# Patient Record
Sex: FEMALE | Race: WHITE | NOT HISPANIC OR LATINO | Employment: FULL TIME | URBAN - METROPOLITAN AREA
[De-identification: names, ages, dates, MRNs, and addresses within clinical notes are randomized per-mention and may not be internally consistent; named-entity substitution may affect disease eponyms.]

---

## 2017-06-21 ENCOUNTER — TRANSCRIBE ORDERS (OUTPATIENT)
Dept: ADMINISTRATIVE | Facility: HOSPITAL | Age: 47
End: 2017-06-21

## 2017-06-21 DIAGNOSIS — Z12.31 VISIT FOR SCREENING MAMMOGRAM: Primary | ICD-10-CM

## 2017-07-20 ENCOUNTER — HOSPITAL ENCOUNTER (OUTPATIENT)
Dept: MAMMOGRAPHY | Facility: MEDICAL CENTER | Age: 47
Discharge: HOME | End: 2017-07-20
Payer: COMMERCIAL

## 2017-07-20 DIAGNOSIS — Z12.31 VISIT FOR SCREENING MAMMOGRAM: ICD-10-CM

## 2017-07-20 PROCEDURE — 77063 BREAST TOMOSYNTHESIS BI: CPT

## 2017-07-20 PROCEDURE — G0202 SCR MAMMO BI INCL CAD: HCPCS

## 2018-07-11 ENCOUNTER — EVALUATION (OUTPATIENT)
Dept: PHYSICAL THERAPY | Facility: CLINIC | Age: 48
End: 2018-07-11
Payer: COMMERCIAL

## 2018-07-11 DIAGNOSIS — M75.02 ADHESIVE BURSITIS OF LEFT SHOULDER: ICD-10-CM

## 2018-07-11 DIAGNOSIS — M25.512 ACUTE PAIN OF LEFT SHOULDER: Primary | ICD-10-CM

## 2018-07-11 DIAGNOSIS — M75.82 ROTATOR CUFF TENDONITIS, LEFT: ICD-10-CM

## 2018-07-11 PROCEDURE — 97110 THERAPEUTIC EXERCISES: CPT | Performed by: PHYSICAL THERAPIST

## 2018-07-11 PROCEDURE — 97140 MANUAL THERAPY 1/> REGIONS: CPT | Performed by: PHYSICAL THERAPIST

## 2018-07-11 PROCEDURE — G8984 CARRY CURRENT STATUS: HCPCS | Performed by: PHYSICAL THERAPIST

## 2018-07-11 PROCEDURE — 97162 PT EVAL MOD COMPLEX 30 MIN: CPT | Performed by: PHYSICAL THERAPIST

## 2018-07-11 PROCEDURE — G8985 CARRY GOAL STATUS: HCPCS | Performed by: PHYSICAL THERAPIST

## 2018-07-11 NOTE — LETTER
2018    Elly Barthel, MD  [facility] [address] [address]    Patient: Amber Carvajal   YOB: 1970   Date of Visit: 2018     Encounter Diagnosis     ICD-10-CM    1  Acute pain of left shoulder M25 512    2  Adhesive bursitis of left shoulder M75 02    3  Rotator cuff tendonitis, left M75 82        Dear Dr Titi Bowser:    Please review the attached Plan of Care from [facility] recent visit  Please verify that you agree therapy should continue by signing the attached document and sending it back to our office  If you have any questions or concerns, please don't hesitate to call  Sincerely,    Charles Aguillon, PT      Referring Provider:      I certify that I have read the below Plan of Care and certify the need for these services furnished under this plan of treatment while under my care  Elly Barthel, MD  [facility] [address]: [phone]          PT Evaluation     Today's date: 2018  Patient name: Amber Carvajal  : 1970  MRN: [id number]  Referring provider: Kenn Gleason MD  Dx:   Encounter Diagnosis     ICD-10-CM    1  Acute pain of left shoulder M25 512    2  Adhesive bursitis of left shoulder M75 02    3  Rotator cuff tendonitis, left M75 82      Assessment  Impairments: abnormal muscle tone, abnormal or restricted ROM, abnormal movement, impaired physical strength, pain with function, poor posture  and poor body mechanics    Assessment details: Suspect Rotator and Adhesive Capsulitis of the left shoulder    Understanding of Dx/Px/POC: excellent  Goals  STG 2-4 weeks  Increase UE strength 3-6 lbs  Decrease pain to <5/10 with activity  Increase UE PROM to [facility] all planes    LTG 6-8 weeks  Increase UE strength 10-20 lbs  Demonstrate UE AROM WFL all planes  Decrease pain to <2/10 with activity  Patient independent with HEP      Plan  Planned modality interventions: ultrasound and unattended electrical stimulation  Planned therapy interventions: manual therapy, joint mobilization, postural training, patient education, strengthening, stretching, therapeutic activities, therapeutic exercise, therapeutic training, flexibility and coordination  Frequency: 3x week  Duration in weeks: 6  Treatment plan discussed with: patient      Subjective Evaluation    Quality of life: excellent    Pain  Quality: discomfort, sharp, radiating, tight, throbbing, dull ache, cramping and knife-like  Relieving factors: medications, ice, change in position, relaxation and rest  Aggravating factors: overhead activity, keyboarding and lifting    Treatments  Current treatment: physical therapy  Patient Goals  Patient goals for therapy: decreased pain, increased motion, return to work, return to [employer] activities, independence with ADLs/IADLs and increased strength        Objective    Flowsheet Rows      Most Recent Value   PT/OT G-Codes   Assessment Type  Evaluation   G code set  Carrying, Moving & Handling Objects   Carrying, Moving and Handling Objects Current Status ()  CK   Carrying, Moving and Handling Objects Goal Status ()  CH        Precautions: Left Shoulder pain and limitations  Date of onset:  4/11/18     Date of Surgery:  None    History of Present Episode: 7/11/2018  Bernardbabs Leo states that over the past few months her left shoulder has flared up  She is really flared up this past week  Past Medical History:    7/11/2018  Kathie Bailey reports no past medical history  Previous Level of Functional Ability:  7/11/2018  Bernardbabs Leo states her left shoulder was fine before these current issues with no limitations  Inspection / Palpation:  7/11/2018  Mesomorphic body type  No signs of infection  No signs of wounds  No signs of drainage  No signs of ecchymotic regions  No signs of erythemic regions  Moderate signs of muscle spasm  Moderate signs of muscle guarding     Moderate signs of tenderness reported to palpation  No signs of swelling  No signs of a surgery site  Current conditions appear consistent with recent episode  Chief Complaints:  7/11/2018  Ricardo Means reports moderate difficulty with bending her left shoulder  Ricardo Means reports moderate difficulty with movement of her left shoulder  Ricardo Means reports mild to moderate difficulty with use of her left arm  Ricardo Means reports moderate difficulty with lifting / elevating her left arm  Ricardo Means reports mild to moderate difficulty with sleeping  Ricardo Means reports moderate difficulty with her strength and endurance  Ricardo Means reports moderate limitations with her left shoulder range of motion  Ricardo Means reports moderate to severe difficulty lying on her left shoulder region      SHOULDER PAIN Resting Palpation Moving Lifting Elevating   7/11/2018 Rt 0 0 0 0 0   7/11/2018 Lt  0-1 0-2 0-5 0-7 2-7     SHOULDER PAIN Sleeping Throwing Pushing Pulling Twisting   7/11/2018 Rt 0 0 0 0 0   7/11/2018 Lt  0-2 NA 0-2 0-2 0-2     SHOULDER AROM Flexion Extension Abduction   7/11/2018 Rt 180° 65° 180°   7/11/2018 Lt 135° 40° 135°     SHOULDER AROM Hor Add Ext Rotation Int Rotation   7/11/2018 Rt 75° 95° 95°   7/11/2018 Lt 45° 65° 52°   29  SHLD MMT Flexion Extension Abduction   7/11/2018 Rt 0/10  28 lbs 0/10  27 lbs 0/10  29 lbs   7/11/2018 Lt 1/10  14 lbs 1/10  15 lbs 3/10  8 lbs     SHLD MMT Hor Add Ext Rotation Int Rotation   7/11/2018 Rt 0/10  29 lbs 0/10  28 lbs 0/10  29 lbs   7/11/2018 Lt 0/10  12 lbs 3/10  9 lbs 0/10  10 lbs     Shoulder Screen Rotator Cuff Apprehension Anterior  Stability Posterior  Stability   Right Negative Negative Negative Negative   Left POSITIVE Negative Negative Negative     Shoulder Screen AC Joint SC Joint Drop Arm Adhesive Capsulitis   Right Negative Negative Negative Negative   Left Negative Negative POSITIVE POSITIVE     Daily Treatment Diary     Manual  7/11       MFR,TPR        MOB, FM        MT, ROM 15'               Exercise Diary 7/11       UBC 10'       FWC-Codman's        FWC-Curls,Tri        Power Web        Digiflex        Finger Ladder        Alec-BP,PD,Lats,Row        Trimax-BP        W/P-PNF,IR,ER,PU,PS,Throw-Top,Mid,Bot        WP-Overhead 10'       ME, PE                Modalities 7/11       MH&ES        US

## 2018-07-11 NOTE — LETTER
2018    Dahiana Welch MD  [facility] [address] [address]    Patient: Helena Acosta   YOB: 1970   Date of Visit: 2018     Encounter Diagnosis     ICD-10-CM    1  Acute pain of left shoulder M25 512    2  Adhesive bursitis of left shoulder M75 02    3  Rotator cuff tendonitis, left M75 82        Dear Dr Hernandez Se:    Please review the attached Plan of Care from [facility] recent visit  Please verify that you agree therapy should continue by signing the attached document and sending it back to our office  If you have any questions or concerns, please don't hesitate to call  Sincerely,    Shar Hunt, PT      Referring Provider:      I certify that I have read the below Plan of Care and certify the need for these services furnished under this plan of treatment while under my care  Dahiana Welch MD  [facility] [address]: [phone]          PT Evaluation     Today's date: 2018  Patient name: Helena Acosta  : 1970  MRN: [id number]  Referring provider: Kristan Butler MD  Dx:   Encounter Diagnosis     ICD-10-CM    1  Acute pain of left shoulder M25 512    2  Adhesive bursitis of left shoulder M75 02    3  Rotator cuff tendonitis, left M75 82      Assessment  Impairments: abnormal muscle tone, abnormal or restricted ROM, abnormal movement, impaired physical strength, pain with function, poor posture  and poor body mechanics    Assessment details: Suspect Rotator and Adhesive Capsulitis of the left shoulder    Understanding of Dx/Px/POC: excellent  Goals  STG 2-4 weeks  Increase UE strength 3-6 lbs  Decrease pain to <5/10 with activity  Increase UE PROM to [facility] all planes    LTG 6-8 weeks  Increase UE strength 10-20 lbs  Demonstrate UE AROM WFL all planes  Decrease pain to <2/10 with activity  Patient independent with HEP      Plan  Planned modality interventions: ultrasound and unattended electrical stimulation  Planned therapy interventions: manual therapy, joint mobilization, postural training, patient education, strengthening, stretching, therapeutic activities, therapeutic exercise, therapeutic training, flexibility and coordination  Frequency: 3x week  Duration in weeks: 6  Treatment plan discussed with: patient      Subjective Evaluation    Quality of life: excellent    Pain  Quality: discomfort, sharp, radiating, tight, throbbing, dull ache, cramping and knife-like  Relieving factors: medications, ice, change in position, relaxation and rest  Aggravating factors: overhead activity, keyboarding and lifting    Treatments  Current treatment: physical therapy  Patient Goals  Patient goals for therapy: decreased pain, increased motion, return to work, return to [employer] activities, independence with ADLs/IADLs and increased strength        Objective    Flowsheet Rows      Most Recent Value   PT/OT G-Codes   Assessment Type  Evaluation   G code set  Carrying, Moving & Handling Objects   Carrying, Moving and Handling Objects Current Status ()  CK   Carrying, Moving and Handling Objects Goal Status ()  CH        Precautions: Left Shoulder pain and limitations  Date of onset:  4/11/18     Date of Surgery:  None    History of Present Episode: 7/11/2018  Ricardo Means states that over the past few months her left shoulder has flared up  She is really flared up this past week  Past Medical History:    7/11/2018  Ricardo Means reports no past medical history  Previous Level of Functional Ability:  7/11/2018  Ricardo Means states her left shoulder was fine before these current issues with no limitations  Inspection / Palpation:  7/11/2018  Mesomorphic body type  No signs of infection  No signs of wounds  No signs of drainage  No signs of ecchymotic regions  No signs of erythemic regions  Moderate signs of muscle spasm  Moderate signs of muscle guarding     Moderate signs of tenderness reported to palpation  No signs of swelling  No signs of a surgery site  Current conditions appear consistent with recent episode  Chief Complaints:  7/11/2018  Enrique Donaldson reports moderate difficulty with bending her left shoulder  Enrique Donaldson reports moderate difficulty with movement of her left shoulder  Enrique Donaldson reports mild to moderate difficulty with use of her left arm  Enrique Donaldson reports moderate difficulty with lifting / elevating her left arm  Enrique Donaldson reports mild to moderate difficulty with sleeping  Enrique Donaldson reports moderate difficulty with her strength and endurance  Enrique Donaldson reports moderate limitations with her left shoulder range of motion  Enrique Donaldson reports moderate to severe difficulty lying on her left shoulder region      SHOULDER PAIN Resting Palpation Moving Lifting Elevating   7/11/2018 Rt 0 0 0 0 0   7/11/2018 Lt  0-1 0-2 0-5 0-7 2-7     SHOULDER PAIN Sleeping Throwing Pushing Pulling Twisting   7/11/2018 Rt 0 0 0 0 0   7/11/2018 Lt  0-2 NA 0-2 0-2 0-2     SHOULDER AROM Flexion Extension Abduction   7/11/2018 Rt 180° 65° 180°   7/11/2018 Lt 135° 40° 135°     SHOULDER AROM Hor Add Ext Rotation Int Rotation   7/11/2018 Rt 75° 95° 95°   7/11/2018 Lt 45° 65° 52°   29  SHLD MMT Flexion Extension Abduction   7/11/2018 Rt 0/10  28 lbs 0/10  27 lbs 0/10  29 lbs   7/11/2018 Lt 1/10  14 lbs 1/10  15 lbs 3/10  8 lbs     SHLD MMT Hor Add Ext Rotation Int Rotation   7/11/2018 Rt 0/10  29 lbs 0/10  28 lbs 0/10  29 lbs   7/11/2018 Lt 0/10  12 lbs 3/10  9 lbs 0/10  10 lbs     Shoulder Screen Rotator Cuff Apprehension Anterior  Stability Posterior  Stability   Right Negative Negative Negative Negative   Left POSITIVE Negative Negative Negative     Shoulder Screen AC Joint SC Joint Drop Arm Adhesive Capsulitis   Right Negative Negative Negative Negative   Left Negative Negative POSITIVE POSITIVE     Daily Treatment Diary     Manual  7/11       MFR,TPR        MOB, FM        MT, ROM 15'               Exercise Diary 7/11       UBC 10'       FWC-Codman's        FWC-Curls,Tri        Power Web        Digiflex        Finger Ladder        Alec-BP,PD,Lats,Row        Trimax-BP        W/P-PNF,IR,ER,PU,PS,Throw-Top,Mid,Bot        WP-Overhead 10'       ME, PE                Modalities 7/11       MH&ES        US

## 2018-07-11 NOTE — PROGRESS NOTES
PT Evaluation     Today's date: 2018  Patient name: Olamide Iraheta  : 1970  MRN: [id number]  Referring provider: Alexandru Garcia MD  Dx:   Encounter Diagnosis     ICD-10-CM    1  Acute pain of left shoulder M25 512    2  Adhesive bursitis of left shoulder M75 02    3  Rotator cuff tendonitis, left M75 82      Assessment  Impairments: abnormal muscle tone, abnormal or restricted ROM, abnormal movement, impaired physical strength, pain with function, poor posture  and poor body mechanics    Assessment details: Suspect Rotator and Adhesive Capsulitis of the left shoulder    Understanding of Dx/Px/POC: excellent  Goals  STG 2-4 weeks  Increase UE strength 3-6 lbs  Decrease pain to <5/10 with activity  Increase UE PROM to [facility] all planes    LTG 6-8 weeks  Increase UE strength 10-20 lbs  Demonstrate UE AROM WFL all planes  Decrease pain to <2/10 with activity  Patient independent with HEP      Plan  Planned modality interventions: ultrasound and unattended electrical stimulation  Planned therapy interventions: manual therapy, joint mobilization, postural training, patient education, strengthening, stretching, therapeutic activities, therapeutic exercise, therapeutic training, flexibility and coordination  Frequency: 3x week  Duration in weeks: 6  Treatment plan discussed with: patient      Subjective Evaluation    Quality of life: excellent    Pain  Quality: discomfort, sharp, radiating, tight, throbbing, dull ache, cramping and knife-like  Relieving factors: medications, ice, change in position, relaxation and rest  Aggravating factors: overhead activity, keyboarding and lifting    Treatments  Current treatment: physical therapy  Patient Goals  Patient goals for therapy: decreased pain, increased motion, return to work, return to [employer] activities, independence with ADLs/IADLs and increased strength        Objective    Flowsheet Rows      Most Recent Value   PT/OT G-Codes   Assessment Type Evaluation   G code set  Carrying, Moving & Handling Objects   Carrying, Moving and Handling Objects Current Status ()  CK   Carrying, Moving and Handling Objects Goal Status ()  CH        Precautions: Left Shoulder pain and limitations  Date of onset:  4/11/18     Date of Surgery:  None    History of Present Episode: 7/11/2018  Estela Andrea states that over the past few months her left shoulder has flared up  She is really flared up this past week  Past Medical History:    7/11/2018  Estela Andrea reports no past medical history  Previous Level of Functional Ability:  7/11/2018  Estela Andrea states her left shoulder was fine before these current issues with no limitations  Inspection / Palpation:  7/11/2018  Mesomorphic body type  No signs of infection  No signs of wounds  No signs of drainage  No signs of ecchymotic regions  No signs of erythemic regions  Moderate signs of muscle spasm  Moderate signs of muscle guarding  Moderate signs of tenderness reported to palpation  No signs of swelling  No signs of a surgery site  Current conditions appear consistent with recent episode  Chief Complaints:  7/11/2018  Estela Andrea reports moderate difficulty with bending her left shoulder  Estela Andrea reports moderate difficulty with movement of her left shoulder  Estela Andrea reports mild to moderate difficulty with use of her left arm  Estela Andrea reports moderate difficulty with lifting / elevating her left arm  Estela Andrea reports mild to moderate difficulty with sleeping  Estela Andrea reports moderate difficulty with her strength and endurance  Estela Andrea reports moderate limitations with her left shoulder range of motion  Estela Andrea reports moderate to severe difficulty lying on her left shoulder region      SHOULDER PAIN Resting Palpation Moving Lifting Elevating   7/11/2018 Rt 0 0 0 0 0   7/11/2018 Lt  0-1 0-2 0-5 0-7 2-7     SHOULDER PAIN Sleeping Throwing Pushing Pulling Twisting   7/11/2018 Rt 0 0 0 0 0   7/11/2018 Lt 0-2 NA 0-2 0-2 0-2     SHOULDER AROM Flexion Extension Abduction   7/11/2018 Rt 180° 65° 180°   7/11/2018 Lt 135° 40° 135°     SHOULDER AROM Hor Add Ext Rotation Int Rotation   7/11/2018 Rt 75° 95° 95°   7/11/2018 Lt 45° 65° 52°   29  SHLD MMT Flexion Extension Abduction   7/11/2018 Rt 0/10  28 lbs 0/10  27 lbs 0/10  29 lbs   7/11/2018 Lt 1/10  14 lbs 1/10  15 lbs 3/10  8 lbs     SHLD MMT Hor Add Ext Rotation Int Rotation   7/11/2018 Rt 0/10  29 lbs 0/10  28 lbs 0/10  29 lbs   7/11/2018 Lt 0/10  12 lbs 3/10  9 lbs 0/10  10 lbs     Shoulder Screen Rotator Cuff Apprehension Anterior  Stability Posterior  Stability   Right Negative Negative Negative Negative   Left POSITIVE Negative Negative Negative     Shoulder Screen AC Joint SC Joint Drop Arm Adhesive Capsulitis   Right Negative Negative Negative Negative   Left Negative Negative POSITIVE POSITIVE     Daily Treatment Diary     Manual  7/11       MFR,TPR        MOB, FM        MT, ROM 15'               Exercise Diary  7/11       UBC 10'       FWC-Codman's        FWC-Curls,Tri        Power Web        Digiflex        Finger Ladder        Alec-BP,PD,Lats,Row        Trimax-BP        W/P-PNF,IR,ER,PU,PS,Throw-Top,Mid,Bot        WP-Overhead 10'       ME, PE                Modalities 7/11       &ES        US

## 2018-07-17 ENCOUNTER — OFFICE VISIT (OUTPATIENT)
Dept: PHYSICAL THERAPY | Facility: CLINIC | Age: 48
End: 2018-07-17
Payer: COMMERCIAL

## 2018-07-17 DIAGNOSIS — M75.02 ADHESIVE BURSITIS OF LEFT SHOULDER: ICD-10-CM

## 2018-07-17 DIAGNOSIS — M75.82 ROTATOR CUFF TENDONITIS, LEFT: ICD-10-CM

## 2018-07-17 DIAGNOSIS — M25.512 ACUTE PAIN OF LEFT SHOULDER: Primary | ICD-10-CM

## 2018-07-17 PROCEDURE — 97140 MANUAL THERAPY 1/> REGIONS: CPT

## 2018-07-17 PROCEDURE — 97110 THERAPEUTIC EXERCISES: CPT

## 2018-07-17 PROCEDURE — 97014 ELECTRIC STIMULATION THERAPY: CPT

## 2018-07-17 NOTE — PROGRESS NOTES
Daily Note     Today's date: 2018  Patient name: Jonn Evangelista  : 1970  MRN: [id number]  Referring provider: Eden Delgado MD  Dx:   Encounter Diagnosis     ICD-10-CM    1  Acute pain of left shoulder M25 512    2  Adhesive bursitis of left shoulder M75 02    3  Rotator cuff tendonitis, left M75 82      Subjective: "I don't have full range of motion in my L shoulder  It hurts if I raise my L arm too high "    Objective: See treatment diary below    Assessment: Tolerated treatment well  Patient exhibited good technique with therapeutic exercises and would benefit from continued PT  Patient reports feeling relief of symptoms post tx  Patient instructed on HEP to increase her ROM, strength and flexibility  Patient demonstrated good understanding of her HEP  Plan: Continue per plan of care  Progress treatment as tolerated        Manual        MFR,TPR        MOB, FM        MT, ROM 15' 30'              Exercise Diary        UBC 10' 10'      FWC-Codman's  3# 30x      FWC-Curls,Tri        Power Web        Digiflex        Finger Ladder        Alec-BP,PD,Lats,Row        Trimax-BP        W/P-PNF,IR,ER,PU,PS,Throw-Top,Mid,Bot  5# 30x      WP-Overhead 10' 2x5'      ME, PE  15'              Modalities       MH&ES  20'      US  NT

## 2018-07-19 ENCOUNTER — OFFICE VISIT (OUTPATIENT)
Dept: PHYSICAL THERAPY | Facility: CLINIC | Age: 48
End: 2018-07-19
Payer: COMMERCIAL

## 2018-07-19 DIAGNOSIS — M75.82 ROTATOR CUFF TENDONITIS, LEFT: ICD-10-CM

## 2018-07-19 DIAGNOSIS — M25.512 ACUTE PAIN OF LEFT SHOULDER: Primary | ICD-10-CM

## 2018-07-19 DIAGNOSIS — M75.02 ADHESIVE BURSITIS OF LEFT SHOULDER: ICD-10-CM

## 2018-07-19 PROCEDURE — 97140 MANUAL THERAPY 1/> REGIONS: CPT

## 2018-07-19 PROCEDURE — 97014 ELECTRIC STIMULATION THERAPY: CPT

## 2018-07-19 PROCEDURE — 97110 THERAPEUTIC EXERCISES: CPT

## 2018-07-19 NOTE — PROGRESS NOTES
Daily Note     Today's date: 2018  Patient name: Saba Phan  : 1970  MRN: [id number]  Referring provider: Deb Pickard MD  Dx:   Encounter Diagnosis     ICD-10-CM    1  Acute pain of left shoulder M25 512    2  Adhesive bursitis of left shoulder M75 02    3  Rotator cuff tendonitis, left M75 82      Subjective: "My shoulder felt much after therapy  It's just a little sore in the front of my L shoulder this morning "    Objective: See treatment diary below    Assessment: Tolerated treatment well  Patient exhibited good technique with therapeutic exercises and would benefit from continued PT  "My L shoulder feels better since I did my exercises "    Plan: Continue per plan of care  Progress treatment as tolerated        Manual       MFR,TPR        MOB, FM        MT, ROM 15' 30' 30'             Exercise Diary       UBC 10' 10' 10'     FWC-Codman's  3# 30x 3# 30x     FWC-Curls,Tri        Power Web        Digiflex        Finger Ladder   3x ea     Alec-BP,PD,Lats,Row        Trimax-BP        W/P-PNF,IR,ER,PU,PS,Throw-Top,Mid,Bot  5# 30x 5# 30x     WP-Overhead 10' 2x5' 2x5' Start behind back stretch    Wall Slides-IYV   3x10     ME, PE  15' 15'             Modalities      MH&ES  20' 20'     US  NT NT

## 2018-07-23 ENCOUNTER — APPOINTMENT (OUTPATIENT)
Dept: PHYSICAL THERAPY | Facility: CLINIC | Age: 48
End: 2018-07-23
Payer: COMMERCIAL

## 2018-07-24 ENCOUNTER — OFFICE VISIT (OUTPATIENT)
Dept: PHYSICAL THERAPY | Facility: CLINIC | Age: 48
End: 2018-07-24
Payer: COMMERCIAL

## 2018-07-24 DIAGNOSIS — M75.02 ADHESIVE BURSITIS OF LEFT SHOULDER: ICD-10-CM

## 2018-07-24 DIAGNOSIS — M75.82 ROTATOR CUFF TENDONITIS, LEFT: ICD-10-CM

## 2018-07-24 DIAGNOSIS — M25.512 ACUTE PAIN OF LEFT SHOULDER: Primary | ICD-10-CM

## 2018-07-24 PROCEDURE — 97035 APP MDLTY 1+ULTRASOUND EA 15: CPT

## 2018-07-24 PROCEDURE — 97014 ELECTRIC STIMULATION THERAPY: CPT

## 2018-07-24 PROCEDURE — 97110 THERAPEUTIC EXERCISES: CPT

## 2018-07-24 PROCEDURE — 97140 MANUAL THERAPY 1/> REGIONS: CPT

## 2018-07-24 NOTE — PROGRESS NOTES
Daily Note     Today's date: 2018  Patient name: Saba Phan  : 1970  MRN: [id number]  Referring provider: Deb Pickard MD  Dx:   Encounter Diagnosis     ICD-10-CM    1  Acute pain of left shoulder M25 512    2  Adhesive bursitis of left shoulder M75 02    3  Rotator cuff tendonitis, left M75 82      Subjective: "I am really hurting today  I over did it this weekend "    Objective: See treatment diary below    Assessment: Tolerated treatment well  Patient exhibited good technique with therapeutic exercises and would benefit from continued PT  Patient able to tolerate therapeutic ex program well with some discomfort today  Patient reports feeling some relief post tx  Plan: Continue per plan of care  Progress treatment as tolerated        Manual      MFR,TPR        MOB, FM        MT, ROM 15' 30' 30' 30'            Exercise Diary      UBC 10' 10' 10' 10'    FWC-Codman's  3# 30x 3# 30x 3# 30x    FWC-Curls,Tri        Power Web        Digiflex        Finger Ladder   3x ea 3x ea    Alec-BP,PD,Lats,Row        Trimax-BP        W/P-PNF,IR,ER,PU,PS,Throw-Top,Mid,Bot  5# 30x 5# 30x 5# 30x    WP-OH,IR 10' 2x5' 2x5' 2x5'    Wall Slides-IYV   3x10     ME, PE  15' 15' 15'            Modalities     MH&ES  20' 20' 20'    US  NT NT 8'

## 2018-07-26 ENCOUNTER — APPOINTMENT (OUTPATIENT)
Dept: PHYSICAL THERAPY | Facility: CLINIC | Age: 48
End: 2018-07-26
Payer: COMMERCIAL

## 2018-07-27 ENCOUNTER — OFFICE VISIT (OUTPATIENT)
Dept: PHYSICAL THERAPY | Facility: CLINIC | Age: 48
End: 2018-07-27
Payer: COMMERCIAL

## 2018-07-27 DIAGNOSIS — M75.82 ROTATOR CUFF TENDONITIS, LEFT: ICD-10-CM

## 2018-07-27 DIAGNOSIS — M25.512 ACUTE PAIN OF LEFT SHOULDER: Primary | ICD-10-CM

## 2018-07-27 DIAGNOSIS — M75.02 ADHESIVE BURSITIS OF LEFT SHOULDER: ICD-10-CM

## 2018-07-27 PROCEDURE — 97110 THERAPEUTIC EXERCISES: CPT

## 2018-07-27 PROCEDURE — 97014 ELECTRIC STIMULATION THERAPY: CPT

## 2018-07-27 PROCEDURE — 97140 MANUAL THERAPY 1/> REGIONS: CPT

## 2018-07-27 NOTE — PROGRESS NOTES
Daily Note     Today's date: 2018  Patient name: Mike Delgado  : 1970  MRN: [id number]  Referring provider: Salinas Rojas MD  Dx:   Encounter Diagnosis     ICD-10-CM    1  Acute pain of left shoulder M25 512    2  Adhesive bursitis of left shoulder M75 02    3  Rotator cuff tendonitis, left M75 82      Subjective: "I feel like I did better last week  This week I'm sore  I don't know if it's the weather or what "    Objective: See treatment diary below    Assessment: Tolerated treatment well  Patient exhibited good technique with therapeutic exercises and would benefit from continued PT  Patient able to tolerate therapeutic ex program well with no increase in symptoms  Plan: Continue per plan of care  Progress treatment as tolerated        Manual        MFR,TPR        MOB, FM        MT, ROM    30' 20'           Exercise Diary        UBC    10' 10'   FWC-Codman's    3# 30x 3# 30x   FWC-Curls,Tri        Power Web        Digiflex        Finger Ladder    3x ea 3x ea   Alec-BP,PD,Lats,Row        Trimax-BP        W/P-PNF,IR,ER,PU,PS,Throw-Top,Mid,Bot    5# 30x 5# 30x   WP-OH,IR    2x5' 2x5'   Wall Slides-IYV     3x10    ME, PE    15' 15'           Modalities       MH&ES    20' 20'   US    8' 8'

## 2018-07-31 ENCOUNTER — OFFICE VISIT (OUTPATIENT)
Dept: PHYSICAL THERAPY | Facility: CLINIC | Age: 48
End: 2018-07-31
Payer: COMMERCIAL

## 2018-07-31 DIAGNOSIS — M75.02 ADHESIVE BURSITIS OF LEFT SHOULDER: ICD-10-CM

## 2018-07-31 DIAGNOSIS — M75.82 ROTATOR CUFF TENDONITIS, LEFT: ICD-10-CM

## 2018-07-31 DIAGNOSIS — M25.512 ACUTE PAIN OF LEFT SHOULDER: Primary | ICD-10-CM

## 2018-07-31 PROCEDURE — 97140 MANUAL THERAPY 1/> REGIONS: CPT | Performed by: PHYSICAL THERAPIST

## 2018-07-31 PROCEDURE — 97110 THERAPEUTIC EXERCISES: CPT | Performed by: PHYSICAL THERAPIST

## 2018-07-31 PROCEDURE — 97014 ELECTRIC STIMULATION THERAPY: CPT | Performed by: PHYSICAL THERAPIST

## 2018-07-31 PROCEDURE — 97035 APP MDLTY 1+ULTRASOUND EA 15: CPT | Performed by: PHYSICAL THERAPIST

## 2018-07-31 NOTE — PROGRESS NOTES
Today's date: 2018  Patient name: Hillary Hunter  : 1970  MRN: [id number]  Referring provider: Essence Meier MD  Dx:   Encounter Diagnosis     ICD-10-CM    1  Acute pain of left shoulder M25 512    2  Adhesive bursitis of left shoulder M75 02    3  Rotator cuff tendonitis, left M75 82      Subjective: Estela Andrea reports that her left shoulder is feeling better  Patient states she was able to lie on her left shoulder for brief periods of time now  Objective: See treatment diary below    Assessment: Tolerated treatment well  Patient exhibited good technique with therapeutic exercises and would benefit from continued PT    Plan: Continue per plan of care  Progress treatment as tolerated         Manual     MT, ROM 15'   30' 20'           Exercise Diary     UBC 10'   10' 10'   FWC-Codman's 3#-30x   3# 30x 3# 30x   FWC-Curls,Tri 3#-30x       Power Web        Digiflex        Finger Ladder 3x   3x ea 3x ea   Alec-BP,PD,Lats,Row        Trimax-BP        W/P-PNF,IR,ER,PU,PS,Throw-Top,Mid,Bot 7 5#-30x   5# 30x 5# 30x   WP-OH,IR 2x5'   2x5' 2x5'   Wall Slides-IYV 30x    3x10    ME, PE 15'   15' 15'           Modalities    MH&ES 20'   20' 20'   US 8'   8' 8'

## 2018-08-02 ENCOUNTER — OFFICE VISIT (OUTPATIENT)
Dept: PHYSICAL THERAPY | Facility: CLINIC | Age: 48
End: 2018-08-02
Payer: COMMERCIAL

## 2018-08-02 DIAGNOSIS — M75.02 ADHESIVE BURSITIS OF LEFT SHOULDER: ICD-10-CM

## 2018-08-02 DIAGNOSIS — M25.512 ACUTE PAIN OF LEFT SHOULDER: Primary | ICD-10-CM

## 2018-08-02 DIAGNOSIS — M75.82 ROTATOR CUFF TENDONITIS, LEFT: ICD-10-CM

## 2018-08-02 PROCEDURE — 97110 THERAPEUTIC EXERCISES: CPT | Performed by: PHYSICAL THERAPIST

## 2018-08-02 PROCEDURE — 97014 ELECTRIC STIMULATION THERAPY: CPT | Performed by: PHYSICAL THERAPIST

## 2018-08-02 PROCEDURE — 97140 MANUAL THERAPY 1/> REGIONS: CPT | Performed by: PHYSICAL THERAPIST

## 2018-08-02 PROCEDURE — 97035 APP MDLTY 1+ULTRASOUND EA 15: CPT | Performed by: PHYSICAL THERAPIST

## 2018-08-02 NOTE — PROGRESS NOTES
Today's date: 2018  Patient name: Kathyleen Harada  : 1970  MRN: [id number]  Referring provider: Kalina Nava MD  Dx:   Encounter Diagnosis     ICD-10-CM    1  Acute pain of left shoulder M25 512    2  Adhesive bursitis of left shoulder M75 02    3  Rotator cuff tendonitis, left M75 82      Subjective: Gordo Daniels reports that her left arm / shoulder is feeling better  Patient did state she slept wrong last night on her shoulder and she has a lot more pain today  Objective: See treatment diary below    Assessment: Tolerated treatment well  Patient exhibited good technique with therapeutic exercises and would benefit from continued PT    Plan: Continue per plan of care  Progress treatment as tolerated         Manual     MT, ROM 15' 15'  30' 20'           Exercise Diary     UBC 10' 10'  10' 10'   FWC-Codman's 3#-30x 5#-30x  3# 30x 3# 30x   FWC-Curls,Tri 3#-30x 5#-30x      Power Web        Digiflex        Finger Ladder 3x 3x  3x ea 3x ea   Alec-BP,PD,Lats,Row        Trimax-BP        W/P-PNF,IR,ER,PU,PS,Throw-Top,Mid,Bot 7 5#-30x 10#-30x  5# 30x 5# 30x   WP-OH,IR 2x5' 2x5'  2x5' 2x5'   Wall Slides-IYV 30x 30x   3x10    ME, PE 15' 15'  15' 15'           Modalities    MH&ES 20' 20'  20' 20'   US 8' 8'  8' 8'

## 2018-08-03 ENCOUNTER — OFFICE VISIT (OUTPATIENT)
Dept: PHYSICAL THERAPY | Facility: CLINIC | Age: 48
End: 2018-08-03
Payer: COMMERCIAL

## 2018-08-03 DIAGNOSIS — M25.512 ACUTE PAIN OF LEFT SHOULDER: Primary | ICD-10-CM

## 2018-08-03 DIAGNOSIS — M75.02 ADHESIVE BURSITIS OF LEFT SHOULDER: ICD-10-CM

## 2018-08-03 DIAGNOSIS — M75.82 ROTATOR CUFF TENDONITIS, LEFT: ICD-10-CM

## 2018-08-03 PROCEDURE — 97110 THERAPEUTIC EXERCISES: CPT

## 2018-08-03 PROCEDURE — 97014 ELECTRIC STIMULATION THERAPY: CPT

## 2018-08-03 PROCEDURE — 97035 APP MDLTY 1+ULTRASOUND EA 15: CPT

## 2018-08-03 PROCEDURE — 97140 MANUAL THERAPY 1/> REGIONS: CPT

## 2018-08-03 NOTE — PROGRESS NOTES
Daily Note     Today's date: 8/3/2018  Patient name: Amee Fisher  : 1970  MRN: [id number]  Referring provider: Johny Potter MD  Dx:   Encounter Diagnosis     ICD-10-CM    1  Acute pain of left shoulder M25 512    2  Adhesive bursitis of left shoulder M75 02    3  Rotator cuff tendonitis, left M75 82      Subjective: "My shoulder is really sore today "    Objective: See treatment diary below    Assessment: Tolerated treatment well  Patient exhibited good technique with therapeutic exercises and would benefit from continued PT  Patient reports feeling some relief post tx  "My shoulder does feel less tight and less painful "    Plan: Continue per plan of care  Progress treatment as tolerated        Manual  7/31 8/2 8/3     MT, ROM 15' 15' 15'             Exercise Diary  7/31 8/2 8/3     UBC 10' 10' 10'     FWC-Codman's 3#-30x 5#-30x 5# 30x     FWC-Curls,Tri 3#-30x 5#-30x 5# 30x     Power Web        Digiflex        Finger Ladder 3x 3x 3x     Alec-BP,PD,Lats,Row        Trimax-BP        W/P-PNF,IR,ER,PU,PS,Throw-Top,Mid,Bot 7 5#-30x 10#-30x      WP-OH,IR 2x5' 2x5' 2x5'     Wall Slides-IYV 30x 30x 30x     ME, PE 15' 15' 15'             Modalities 7/31 8/2 8/3     MH&ES 20' 20' 20'     US 8' 8' 8'

## 2018-08-06 ENCOUNTER — OFFICE VISIT (OUTPATIENT)
Dept: PHYSICAL THERAPY | Facility: CLINIC | Age: 48
End: 2018-08-06
Payer: COMMERCIAL

## 2018-08-06 DIAGNOSIS — M25.512 ACUTE PAIN OF LEFT SHOULDER: ICD-10-CM

## 2018-08-06 DIAGNOSIS — M75.82 ROTATOR CUFF TENDONITIS, LEFT: ICD-10-CM

## 2018-08-06 DIAGNOSIS — M75.02 ADHESIVE BURSITIS OF LEFT SHOULDER: Primary | ICD-10-CM

## 2018-08-06 PROCEDURE — 97140 MANUAL THERAPY 1/> REGIONS: CPT | Performed by: PHYSICAL THERAPIST

## 2018-08-06 PROCEDURE — 97014 ELECTRIC STIMULATION THERAPY: CPT | Performed by: PHYSICAL THERAPIST

## 2018-08-06 PROCEDURE — 97110 THERAPEUTIC EXERCISES: CPT | Performed by: PHYSICAL THERAPIST

## 2018-08-06 PROCEDURE — G8985 CARRY GOAL STATUS: HCPCS | Performed by: PHYSICAL THERAPIST

## 2018-08-06 PROCEDURE — G8984 CARRY CURRENT STATUS: HCPCS | Performed by: PHYSICAL THERAPIST

## 2018-08-06 PROCEDURE — 97164 PT RE-EVAL EST PLAN CARE: CPT | Performed by: PHYSICAL THERAPIST

## 2018-08-06 PROCEDURE — 97035 APP MDLTY 1+ULTRASOUND EA 15: CPT | Performed by: PHYSICAL THERAPIST

## 2018-08-06 NOTE — LETTER
2018      Barb Dorsey MD  [address] [place] [id number]    Patient: Celina Whatley   YOB: 1970   Date of Visit: 2018     Encounter Diagnosis     ICD-10-CM    1  Adhesive bursitis of left shoulder M75 02    2  Acute pain of left shoulder M25 512    3  Rotator cuff tendonitis, left M75 82        Dear Dr Janet Kerr:    Please review the attached Plan of Care from [facility] recent visit  Please verify that you agree therapy should continue by signing the attached document and sending it back to our office  If you have any questions or concerns, please don't hesitate to call  Sincerely,    Katia Tan, PT      Referring Provider:      I certify that I have read the below Plan of Care and certify the need for these services furnished under this plan of treatment while under my care  Barb Dorsey MD  [address] [place] [address] [address]: [phone]          Today's date: 2018  Patient name: Celina Whatley  : 1970  MRN: [id number]  Referring provider: Glen Lindsey MD  Dx:   Encounter Diagnosis     ICD-10-CM    1  Adhesive bursitis of left shoulder M75 02    2  Acute pain of left shoulder M25 512    3  Rotator cuff tendonitis, left M75 82      Subjective: Ana Hebert reports that her left shoulder really hurt her this week end when she tried to reach up and close a window  She is feeling better but she can not perform any heavy lifting with her left shoulder now  Objective: See treatment diary below    Assessment: Tolerated treatment well  Patient exhibited good technique with therapeutic exercises and would benefit from continued PT    Plan: Continue per plan of care  Progress treatment as tolerated         Manual  7/31 8/2 8/3 8/6    MT, ROM 15' 15' 15' 15'            Exercise Diary  7/31 8/2 8/3 8/6    UBC 10' 10' 10' 10'    FWC-Codman's 3#-30x 5#-30x 5# 30x 5#-30x    FWC-Curls,Tri 3#-30x 5#-30x 5# 30x 5#-30x Power Web        Digiflex        Finger Ladder 3x 3x 3x 3x    Alec-BP,PD,Lats,Row        Trimax-BP        W/P-PNF,IR,ER,PU,PS,Throw-Top,Mid,Bot 7 5#-30x 10#-30x  10#-30x    WP-OH,IR 2x5' 2x5' 2x5' 2x5'    Wall Slides-IYV 30x 30x 30x 30x    ME, PE 15' 15' 15' 15'            Modalities 7/31 8/2 8/3 8/6    MH&ES 20' 20' 20' 20'    US 8' 8' 8' 8'                PT Re-Evaluation     Today's date: 2018  Patient name: Bella Darling  : 1970  MRN: [id number]  Referring provider: Suzy Bravo MD  Dx:   Encounter Diagnosis     ICD-10-CM    1  Adhesive bursitis of left shoulder M75 02    2  Acute pain of left shoulder M25 512    3  Rotator cuff tendonitis, left M75 82      Start Time: 0730  Stop Time: 0910  Total time in clinic (min): 100 minutes    Assessment  Impairments: abnormal or restricted ROM, abnormal movement, activity intolerance, impaired physical strength, pain with function and safety issue    Assessment details: Improving but left shoulder is still limited and painful especially with overhead activities    Understanding of Dx/Px/POC: excellent  Goals  STG 2-4 weeks  Increase UE strength by 3-6 lbs  Decrease pain by 1-2 levels on 1-10 pain scale  Increase UE PROM by 10-15 Degrees in all planes    LTG 6-8 weeks  Increase UE strength 10-20 lbs  Demonstrate UE AROM WFL all planes  Decrease pain to <2/10 with activity  Patient independent with HEP      Plan  Planned modality interventions: ultrasound and unattended electrical stimulation  Planned therapy interventions: manual therapy, joint mobilization, patient education, postural training, strengthening, stretching, therapeutic activities, therapeutic exercise, therapeutic training, flexibility and coordination  Frequency: 3x week  Duration in weeks: 6  Treatment plan discussed with: patient      Subjective Evaluation    Quality of life: excellent    Pain  Quality: cramping, discomfort, dull ache, knife-like, needle-like, pressure, pulling, sharp, squeezing, tight and throbbing  Progression: improved    Treatments  Current treatment: physical therapy  Patient Goals  Patient goals for therapy: decreased pain, increased motion, return to work, return to [employer] activities, independence with ADLs/IADLs and increased strength        Objective     Precautions: Left Shoulder pain and limitations  Date of onset:  4/11/18     Date of Surgery:  None    History of Present Episode: 7/11/2018  Isadora Gowers states that over the past few months her left shoulder has flared up  She is really flared up this past week  Past Medical History:    7/11/2018  Jillian Gowers reports no past medical history  Previous Level of Functional Ability:  7/11/2018  Jillian Gowers states her left shoulder was fine before these current issues with no limitations  Inspection / Palpation:  7/11/2018  Mesomorphic body type  No signs of infection  No signs of wounds  No signs of drainage  No signs of ecchymotic regions  No signs of erythemic regions  Moderate signs of muscle spasm  Moderate signs of muscle guarding  Moderate signs of tenderness reported to palpation  No signs of swelling  No signs of a surgery site  Current conditions appear consistent with recent episode  Chief Complaints:  7/11/2018  Jillian Gowers reports moderate difficulty with bending her left shoulder  Jillian Gowers reports moderate difficulty with movement of her left shoulder  Jillian Gowers reports mild to moderate difficulty with use of her left arm  Jillian Gowers reports moderate difficulty with lifting / elevating her left arm  Jillian Gowers reports mild to moderate difficulty with sleeping  Jillian Gowers reports moderate difficulty with her strength and endurance  Jillian Gowers reports moderate limitations with her left shoulder range of motion  Jillian Gowers reports moderate to severe difficulty lying on her left shoulder region      SHOULDER PAIN Resting Palpation Moving Lifting Elevating   7/11/2018 Rt 0 0 0 0 0   7/11/2018 Lt  0-1 0-2 0-5 0-7 2-7     SHOULDER PAIN Sleeping Throwing Pushing Pulling Twisting   7/11/2018 Rt 0 0 0 0 0   7/11/2018 Lt  0-2 NA 0-2 0-2 0-2     SHOULDER AROM Flexion Extension Abduction   7/11/2018 Rt 180° 65° 180°   7/11/2018 Lt 135° 40° 135°     SHOULDER AROM Hor Add Ext Rotation Int Rotation   7/11/2018 Rt 75° 95° 95°   7/11/2018 Lt 45° 65° 52°   29  SHLD MMT Flexion Extension Abduction   7/11/2018 Rt 0/10  28 lbs 0/10  27 lbs 0/10  29 lbs   7/11/2018 Lt 1/10  14 lbs 1/10  15 lbs 3/10  8 lbs     SHLD MMT Hor Add Ext Rotation Int Rotation   7/11/2018 Rt 0/10  29 lbs 0/10  28 lbs 0/10  29 lbs   7/11/2018 Lt 0/10  12 lbs 3/10  9 lbs 0/10  10 lbs     Shoulder Screen Rotator Cuff Apprehension Anterior  Stability Posterior  Stability   Right Negative Negative Negative Negative   Left POSITIVE Negative Negative Negative     Shoulder Screen AC Joint SC Joint Drop Arm Adhesive Capsulitis   Right Negative Negative Negative Negative   Left Negative Negative POSITIVE POSITIVE

## 2018-08-06 NOTE — LETTER
2018    Silvestre Florez MD  [facility] [address] [address]    Patient: Mike Delgado   YOB: 1970   Date of Visit: 2018     Encounter Diagnosis     ICD-10-CM    1  Adhesive bursitis of left shoulder M75 02    2  Acute pain of left shoulder M25 512    3  Rotator cuff tendonitis, left M75 82        Dear Dr Nicole Gomez:    Please review the attached Plan of Care from [facility] recent visit  Please verify that you agree therapy should continue by signing the attached document and sending it back to our office  If you have any questions or concerns, please don't hesitate to call  Sincerely,    Darrius Graves, PT      Referring Provider:      I certify that I have read the below Plan of Care and certify the need for these services furnished under this plan of treatment while under my care  Silvestre Florez MD  [facility] [address]: [phone]          Today's date: 2018  Patient name: Mike Delgado  : 1970  MRN: [id number]  Referring provider: Salinas Rojas MD  Dx:   Encounter Diagnosis     ICD-10-CM    1  Adhesive bursitis of left shoulder M75 02    2  Acute pain of left shoulder M25 512    3  Rotator cuff tendonitis, left M75 82      Subjective: Claudette Sarmiento reports that her left shoulder really hurt her this week end when she tried to reach up and close a window  She is feeling better but she can not perform any heavy lifting with her left shoulder now  Objective: See treatment diary below    Assessment: Tolerated treatment well  Patient exhibited good technique with therapeutic exercises and would benefit from continued PT    Plan: Continue per plan of care  Progress treatment as tolerated         Manual  7/31 8/2 8/3 8/6    MT, ROM 15' 15' 15' 15'            Exercise Diary  7/31 8/2 8/3 8/6    UBC 10' 10' 10' 10'    FW-Codman's 3#-30x 5#-30x 5# 30x 5#-30x    FWC-Curls,Tri 3#-30x 5#-30x 5# 30x 5#-30x    Power Web        Digiflex        Finger Ladder 3x 3x 3x 3x    Alec-BP,PD,Lats,Row        Trimax-BP        W/P-PNF,IR,ER,PU,PS,Throw-Top,Mid,Bot 7 5#-30x 10#-30x  10#-30x    WP-OH,IR 2x5' 2x5' 2x5' 2x5'    Wall Slides-IYV 30x 30x 30x 30x    ME, PE 15' 15' 15' 15'            Modalities 7/31 8/2 8/3 8/6    MH&ES 20' 20' 20' 20'    US 8' 8' 8' 8'                PT Re-Evaluation     Today's date: 2018  Patient name: Camron Lord  : 1970  MRN: [id number]  Referring provider: Cordelia Leonard MD  Dx:   Encounter Diagnosis     ICD-10-CM    1  Adhesive bursitis of left shoulder M75 02    2  Acute pain of left shoulder M25 512    3  Rotator cuff tendonitis, left M75 82      Start Time: 730  Stop Time: 910  Total time in clinic (min): 100 minutes    Assessment  Impairments: abnormal or restricted ROM, abnormal movement, activity intolerance, impaired physical strength, pain with function and safety issue    Assessment details: Improving but left shoulder is still limited and painful especially with overhead activities    Understanding of Dx/Px/POC: excellent  Goals  STG 2-4 weeks  Increase UE strength by 3-6 lbs  Decrease pain by 1-2 levels on 1-10 pain scale  Increase UE PROM by 10-15 Degrees in all planes    LTG 6-8 weeks  Increase UE strength 10-20 lbs  Demonstrate UE AROM WFL all planes  Decrease pain to <2/10 with activity  Patient independent with HEP      Plan  Planned modality interventions: ultrasound and unattended electrical stimulation  Planned therapy interventions: manual therapy, joint mobilization, patient education, postural training, strengthening, stretching, therapeutic activities, therapeutic exercise, therapeutic training, flexibility and coordination  Frequency: 3x week  Duration in weeks: 6  Treatment plan discussed with: patient      Subjective Evaluation    Quality of life: excellent    Pain  Quality: cramping, discomfort, dull ache, knife-like, needle-like, pressure, pulling, sharp, squeezing, tight and throbbing  Progression: improved    Treatments  Current treatment: physical therapy  Patient Goals  Patient goals for therapy: decreased pain, increased motion, return to work, return to [employer] activities, independence with ADLs/IADLs and increased strength        Objective     Precautions: Left Shoulder pain and limitations  Date of onset:  4/11/18     Date of Surgery:  None    History of Present Episode: 7/11/2018  Claudette Sarmiento states that over the past few months her left shoulder has flared up  She is really flared up this past week  Past Medical History:    7/11/2018  Claudette Sarmiento reports no past medical history  Previous Level of Functional Ability:  7/11/2018  Claudette Sarmiento states her left shoulder was fine before these current issues with no limitations  Inspection / Palpation:  7/11/2018  Mesomorphic body type  No signs of infection  No signs of wounds  No signs of drainage  No signs of ecchymotic regions  No signs of erythemic regions  Moderate signs of muscle spasm  Moderate signs of muscle guarding  Moderate signs of tenderness reported to palpation  No signs of swelling  No signs of a surgery site  Current conditions appear consistent with recent episode  Chief Complaints:  7/11/2018  Claudette Sarmiento reports moderate difficulty with bending her left shoulder  Claudette Sarmiento reports moderate difficulty with movement of her left shoulder  Claudette Sarmiento reports mild to moderate difficulty with use of her left arm  Claudette Sarmiento reports moderate difficulty with lifting / elevating her left arm  Claudette Sarmiento reports mild to moderate difficulty with sleeping  Claudette Sarmiento reports moderate difficulty with her strength and endurance  Claudette Sarmiento reports moderate limitations with her left shoulder range of motion  Claudette Sarmiento reports moderate to severe difficulty lying on her left shoulder region      SHOULDER PAIN Resting Palpation Moving Lifting Elevating   7/11/2018 Rt 0 0 0 0 0   7/11/2018 Lt  0-1 0-2 0-5 0-7 2-7     SHOULDER PAIN Sleeping Throwing Pushing Pulling Twisting   7/11/2018 Rt 0 0 0 0 0   7/11/2018 Lt  0-2 NA 0-2 0-2 0-2     SHOULDER AROM Flexion Extension Abduction   7/11/2018 Rt 180° 65° 180°   7/11/2018 Lt 135° 40° 135°     SHOULDER AROM Hor Add Ext Rotation Int Rotation   7/11/2018 Rt 75° 95° 95°   7/11/2018 Lt 45° 65° 52°   29  SHLD MMT Flexion Extension Abduction   7/11/2018 Rt 0/10  28 lbs 0/10  27 lbs 0/10  29 lbs   7/11/2018 Lt 1/10  14 lbs 1/10  15 lbs 3/10  8 lbs     SHLD MMT Hor Add Ext Rotation Int Rotation   7/11/2018 Rt 0/10  29 lbs 0/10  28 lbs 0/10  29 lbs   7/11/2018 Lt 0/10  12 lbs 3/10  9 lbs 0/10  10 lbs     Shoulder Screen Rotator Cuff Apprehension Anterior  Stability Posterior  Stability   Right Negative Negative Negative Negative   Left POSITIVE Negative Negative Negative     Shoulder Screen AC Joint SC Joint Drop Arm Adhesive Capsulitis   Right Negative Negative Negative Negative   Left Negative Negative POSITIVE POSITIVE

## 2018-08-06 NOTE — PROGRESS NOTES
PT Re-Evaluation     Today's date: 2018  Patient name: Hillary Hunter  : 1970  MRN: [id number]  Referring provider: Essence Meier MD  Dx:   Encounter Diagnosis     ICD-10-CM    1  Adhesive bursitis of left shoulder M75 02    2  Acute pain of left shoulder M25 512    3  Rotator cuff tendonitis, left M75 82      Start Time: 0730  Stop Time: 0910  Total time in clinic (min): 100 minutes    Assessment  Impairments: abnormal or restricted ROM, abnormal movement, activity intolerance, impaired physical strength, pain with function and safety issue    Assessment details: Improving but left shoulder is still limited and painful especially with overhead activities    Understanding of Dx/Px/POC: excellent  Goals  STG 2-4 weeks  Increase UE strength by 3-6 lbs  Decrease pain by 1-2 levels on 1-10 pain scale  Increase UE PROM by 10-15 Degrees in all planes    LTG 6-8 weeks  Increase UE strength 10-20 lbs  Demonstrate UE AROM WFL all planes  Decrease pain to <2/10 with activity  Patient independent with HEP      Plan  Planned modality interventions: ultrasound and unattended electrical stimulation  Planned therapy interventions: manual therapy, joint mobilization, patient education, postural training, strengthening, stretching, therapeutic activities, therapeutic exercise, therapeutic training, flexibility and coordination  Frequency: 3x week  Duration in weeks: 6  Treatment plan discussed with: patient      Subjective Evaluation    Quality of life: excellent    Pain  Quality: cramping, discomfort, dull ache, knife-like, needle-like, pressure, pulling, sharp, squeezing, tight and throbbing  Progression: improved    Treatments  Current treatment: physical therapy  Patient Goals  Patient goals for therapy: decreased pain, increased motion, return to work, return to [employer] activities, independence with ADLs/IADLs and increased strength        Objective     Precautions: Left Shoulder pain and limitations  Date of onset:  4/11/18     Date of Surgery:  None    History of Present Episode: 7/11/2018  Gordo Daniels states that over the past few months her left shoulder has flared up  She is really flared up this past week  Past Medical History:    7/11/2018  Gordo Daniels reports no past medical history  Previous Level of Functional Ability:  7/11/2018  Gordo Daniels states her left shoulder was fine before these current issues with no limitations  Inspection / Palpation:  7/11/2018  Mesomorphic body type  No signs of infection  No signs of wounds  No signs of drainage  No signs of ecchymotic regions  No signs of erythemic regions  Moderate signs of muscle spasm  Moderate signs of muscle guarding  Moderate signs of tenderness reported to palpation  No signs of swelling  No signs of a surgery site  Current conditions appear consistent with recent episode  Chief Complaints:  7/11/2018  Gordo Daniels reports moderate difficulty with bending her left shoulder  Gordo Daniels reports moderate difficulty with movement of her left shoulder  Gordo Daniels reports mild to moderate difficulty with use of her left arm  Gordo Daniels reports moderate difficulty with lifting / elevating her left arm  Gordo Daniels reports mild to moderate difficulty with sleeping  Gordo Daniels reports moderate difficulty with her strength and endurance  Gordo Daniels reports moderate limitations with her left shoulder range of motion  Gordo Daniels reports moderate to severe difficulty lying on her left shoulder region      SHOULDER PAIN Resting Palpation Moving Lifting Elevating   7/11/2018 Rt 0 0 0 0 0   7/11/2018 Lt  0-1 0-2 0-5 0-7 2-7     SHOULDER PAIN Sleeping Throwing Pushing Pulling Twisting   7/11/2018 Rt 0 0 0 0 0   7/11/2018 Lt  0-2 NA 0-2 0-2 0-2     SHOULDER AROM Flexion Extension Abduction   7/11/2018 Rt 180° 65° 180°   7/11/2018 Lt 135° 40° 135°     SHOULDER AROM Hor Add Ext Rotation Int Rotation   7/11/2018 Rt 75° 95° 95°   7/11/2018 Lt 45° 65° 52° 29  SHLD MMT Flexion Extension Abduction   7/11/2018 Rt 0/10  28 lbs 0/10  27 lbs 0/10  29 lbs   7/11/2018 Lt 1/10  14 lbs 1/10  15 lbs 3/10  8 lbs     SHLD MMT Hor Add Ext Rotation Int Rotation   7/11/2018 Rt 0/10  29 lbs 0/10  28 lbs 0/10  29 lbs   7/11/2018 Lt 0/10  12 lbs 3/10  9 lbs 0/10  10 lbs     Shoulder Screen Rotator Cuff Apprehension Anterior  Stability Posterior  Stability   Right Negative Negative Negative Negative   Left POSITIVE Negative Negative Negative     Shoulder Screen AC Joint SC Joint Drop Arm Adhesive Capsulitis   Right Negative Negative Negative Negative   Left Negative Negative POSITIVE POSITIVE

## 2018-08-08 ENCOUNTER — OFFICE VISIT (OUTPATIENT)
Dept: PHYSICAL THERAPY | Facility: CLINIC | Age: 48
End: 2018-08-08
Payer: COMMERCIAL

## 2018-08-08 DIAGNOSIS — M25.512 ACUTE PAIN OF LEFT SHOULDER: ICD-10-CM

## 2018-08-08 DIAGNOSIS — M75.02 ADHESIVE BURSITIS OF LEFT SHOULDER: Primary | ICD-10-CM

## 2018-08-08 DIAGNOSIS — M75.82 ROTATOR CUFF TENDONITIS, LEFT: ICD-10-CM

## 2018-08-08 PROCEDURE — 97035 APP MDLTY 1+ULTRASOUND EA 15: CPT

## 2018-08-08 PROCEDURE — 97110 THERAPEUTIC EXERCISES: CPT

## 2018-08-08 PROCEDURE — 97140 MANUAL THERAPY 1/> REGIONS: CPT

## 2018-08-08 PROCEDURE — 97014 ELECTRIC STIMULATION THERAPY: CPT

## 2018-08-08 NOTE — PROGRESS NOTES
Daily Note     Today's date: 2018  Patient name: Demarcus Sewell  : 1970  MRN: [id number]  Referring provider: Aldo Tyler MD  Dx:   Encounter Diagnosis     ICD-10-CM    1  Adhesive bursitis of left shoulder M75 02    2  Acute pain of left shoulder M25 512    3  Rotator cuff tendonitis, left M75 82      Subjective: No new c/o pain today  Objective: See treatment diary below    Assessment: Tolerated treatment well  Patient exhibited good technique with therapeutic exercises and would benefit from continued PT  Patient reports feeling relief of tightness/spasm throughout L shoulder girdle post tx  "Wow, the whole back of my shoulder feels so much better  I can even move my neck more than I have in awhile "    Plan: Continue per plan of care  Progress treatment as tolerated        Manual        MT, ROM    15' 30'           Exercise Diary        UBC    10' 10'   FWC-Codman's    5#-30x 5# 30x   FWC-Curls,Tri    5#-30x 5# 30x   Power Web        Digiflex        Finger Ladder    3x 3x   Alec-BP,PD,Lats,Row     25# 30x   Trimax-BP        W/P-PNF,IR,ER,PU,PS,Throw-Top,Mid,Bot    10#-30x 7 5# 30x   WP-OH,IR    2x5' 2x5'   Wall Slides-IYV    30x    ME, PE    15' 15'           Modalities       MH&ES    20' 10'   US    8' 8'

## 2018-08-10 ENCOUNTER — OFFICE VISIT (OUTPATIENT)
Dept: PHYSICAL THERAPY | Facility: CLINIC | Age: 48
End: 2018-08-10
Payer: COMMERCIAL

## 2018-08-10 DIAGNOSIS — M25.512 ACUTE PAIN OF LEFT SHOULDER: ICD-10-CM

## 2018-08-10 DIAGNOSIS — M75.02 ADHESIVE BURSITIS OF LEFT SHOULDER: Primary | ICD-10-CM

## 2018-08-10 DIAGNOSIS — M75.82 ROTATOR CUFF TENDONITIS, LEFT: ICD-10-CM

## 2018-08-10 PROCEDURE — 97110 THERAPEUTIC EXERCISES: CPT

## 2018-08-10 PROCEDURE — 97014 ELECTRIC STIMULATION THERAPY: CPT

## 2018-08-10 PROCEDURE — 97140 MANUAL THERAPY 1/> REGIONS: CPT

## 2018-08-10 NOTE — PROGRESS NOTES
Daily Note     Today's date: 8/10/2018  Patient name: Amee Fisher  : 1970  MRN: [id number]  Referring provider: Johny Potter MD  Dx:   Encounter Diagnosis     ICD-10-CM    1  Adhesive bursitis of left shoulder M75 02    2  Acute pain of left shoulder M25 512    3  Rotator cuff tendonitis, left M75 82      Subjective: No new c/o pain today  Objective: See treatment diary below    Assessment: Tolerated treatment well  Patient exhibited good technique with therapeutic exercises and would benefit from continued PT  Patient able to obtain increased AROM post tx today  Patient c/o discomfort at end range of motion with PROM   "I feel like my muscles are stuck "    Plan: Continue per plan of care  Progress treatment as tolerated        Manual  8/10    8/8   MT, ROM 30'    30'           Exercise Diary  8/10    8/8   UBC 10'    10'   FWC-Codman's 5# 30x    5# 30x   FWC-Curls,Tri 5# 30x    5# 30x   Power Web        Digiflex        Finger Ladder 3x    3x   Alec-BP,PD,Lats,Row 25# 30x    25# 30x   Trimax-BP        W/P-PNF,IR,ER,PU,PS,Throw-Top,Mid,Bot 7 5# 30x    7 5# 30x   WP-OH,IR 2x5'    2x5'   Wall Slides-IYV 3x10       ME, PE 15'    15'           Modalities 8/10    8/8   MH&ES 15'    10'   US 6'    8'

## 2018-08-13 ENCOUNTER — OFFICE VISIT (OUTPATIENT)
Dept: PHYSICAL THERAPY | Facility: CLINIC | Age: 48
End: 2018-08-13
Payer: COMMERCIAL

## 2018-08-13 DIAGNOSIS — M75.82 ROTATOR CUFF TENDONITIS, LEFT: ICD-10-CM

## 2018-08-13 DIAGNOSIS — M75.02 ADHESIVE BURSITIS OF LEFT SHOULDER: Primary | ICD-10-CM

## 2018-08-13 DIAGNOSIS — M25.512 ACUTE PAIN OF LEFT SHOULDER: ICD-10-CM

## 2018-08-13 PROCEDURE — 97140 MANUAL THERAPY 1/> REGIONS: CPT

## 2018-08-13 PROCEDURE — 97110 THERAPEUTIC EXERCISES: CPT

## 2018-08-13 PROCEDURE — 97035 APP MDLTY 1+ULTRASOUND EA 15: CPT

## 2018-08-13 PROCEDURE — 97014 ELECTRIC STIMULATION THERAPY: CPT

## 2018-08-13 NOTE — PROGRESS NOTES
Daily Note     Today's date: 2018  Patient name: Vicente Adame  : 1970  MRN: [id number]  Referring provider: Gladis Bailey MD  Dx: No diagnosis found  Subjective: No new c/o pain today  Objective: See treatment diary below    Assessment: Tolerated treatment well  Patient exhibited good technique with therapeutic exercises and would benefit from continued PT  Patient able to tolerate therapeutic ex program well with no increase in symptoms  Plan: Continue per plan of care  Progress treatment as tolerated        Manual  8/10 8/13      MT, ROM 30' 25'              Exercise Diary  8/10 8/13      UBC 10' 10'      FWC-Codman's 5# 30x 5# 30x      FWC-Curls,Tri 5# 30x 5# 30x      Power Web        Digiflex        Finger Ladder 3x 3x      Alec-BP,PD,Lats,Row 25# 30x 25# 30x      Trimax-BP        W/P-PNF,IR,ER,PU,PS,Throw-Top,Mid,Bot 7 5# 30x 7 5# 30x      WP-OH,IR 2x5' 2x5'      Wall Slides-IYV 3x10 3x10      ME, PE 15' 15'              Modalities 8/10 8/13      MH&ES 15' 10'      US 6' 8'

## 2018-08-14 ENCOUNTER — DOCTOR'S OFFICE (OUTPATIENT)
Dept: URBAN - METROPOLITAN AREA CLINIC 125 | Facility: CLINIC | Age: 48
Setting detail: OPHTHALMOLOGY
End: 2018-08-14
Payer: COMMERCIAL

## 2018-08-14 DIAGNOSIS — H52.4: ICD-10-CM

## 2018-08-14 DIAGNOSIS — H52.223: ICD-10-CM

## 2018-08-14 PROCEDURE — 92015 DETERMINE REFRACTIVE STATE: CPT | Performed by: OPTOMETRIST

## 2018-08-14 PROCEDURE — 92002 INTRM OPH EXAM NEW PATIENT: CPT | Performed by: OPTOMETRIST

## 2018-08-14 ASSESSMENT — REFRACTION_OUTSIDERX
OD_CYLINDER: -1.50
OD_VA3: 20/
OS_VA3: 20/
OU_VA: 20/
OD_SPHERE: +0.25
OS_VA2: 20/
OS_CYLINDER: -1.75
OD_ADD: +2.00
OS_SPHERE: PL-
OD_VA2: 20/
OS_AXIS: 025
OD_VA1: 20/20
OD_AXIS: 167
OS_VA1: 20/20
OS_ADD: +2.00

## 2018-08-14 ASSESSMENT — REFRACTION_MANIFEST
OS_VA2: 20/
OD_VA1: 20/
OU_VA: 20/
OS_VA1: 20/
OS_VA3: 20/
OS_VA2: 20/
OD_VA2: 20/
OS_VA1: 20/
OD_VA3: 20/
OD_VA1: 20/
OS_VA3: 20/
OD_VA2: 20/
OU_VA: 20/
OD_VA3: 20/

## 2018-08-15 ASSESSMENT — REFRACTION_CURRENTRX
OS_AXIS: 026
OD_CYLINDER: -1.50
OS_CYLINDER: -2.00
OD_OVR_VA: 20/
OS_SPHERE: +0.50
OS_OVR_VA: 20/
OD_VPRISM_DIRECTION: PROGS
OS_ADD: +1.50
OD_AXIS: 167
OD_SPHERE: PL
OS_OVR_VA: 20/
OD_OVR_VA: 20/
OD_ADD: +1.50
OD_OVR_VA: 20/
OS_VPRISM_DIRECTION: PROGS
OS_OVR_VA: 20/

## 2018-08-15 ASSESSMENT — REFRACTION_AUTOREFRACTION
OD_AXIS: 167
OS_AXIS: 007
OS_SPHERE: +0.25
OD_CYLINDER: -1.25
OD_SPHERE: +0.50
OS_CYLINDER: -1.75

## 2018-08-15 ASSESSMENT — AXIALLENGTH_DERIVED
OD_AL: 23.5032
OS_AL: 23.6981

## 2018-08-15 ASSESSMENT — SPHEQUIV_DERIVED
OS_SPHEQUIV: -0.625
OD_SPHEQUIV: -0.125

## 2018-08-15 ASSESSMENT — KERATOMETRY
OD_AXISANGLE_DEGREES: 171
OD_K2POWER_DIOPTERS: 44.25
OD_K1POWER_DIOPTERS: 43.50
OS_AXISANGLE_DEGREES: 011
OS_K2POWER_DIOPTERS: 44.75
OS_K1POWER_DIOPTERS: 43.00

## 2018-08-15 ASSESSMENT — VISUAL ACUITY
OS_BCVA: 20/20-2
OD_BCVA: 20/20

## 2018-08-17 ENCOUNTER — OFFICE VISIT (OUTPATIENT)
Dept: PHYSICAL THERAPY | Facility: CLINIC | Age: 48
End: 2018-08-17
Payer: COMMERCIAL

## 2018-08-17 DIAGNOSIS — M25.512 ACUTE PAIN OF LEFT SHOULDER: ICD-10-CM

## 2018-08-17 DIAGNOSIS — M75.82 ROTATOR CUFF TENDONITIS, LEFT: ICD-10-CM

## 2018-08-17 DIAGNOSIS — M75.02 ADHESIVE BURSITIS OF LEFT SHOULDER: Primary | ICD-10-CM

## 2018-08-17 PROCEDURE — 97014 ELECTRIC STIMULATION THERAPY: CPT | Performed by: PHYSICAL THERAPIST

## 2018-08-17 PROCEDURE — 97035 APP MDLTY 1+ULTRASOUND EA 15: CPT | Performed by: PHYSICAL THERAPIST

## 2018-08-17 PROCEDURE — 97140 MANUAL THERAPY 1/> REGIONS: CPT | Performed by: PHYSICAL THERAPIST

## 2018-08-17 PROCEDURE — 97110 THERAPEUTIC EXERCISES: CPT | Performed by: PHYSICAL THERAPIST

## 2018-08-17 NOTE — PROGRESS NOTES
Today's date: 2018  Patient name: Duncan Beckford  : 1970  MRN: [id number]  Referring provider: Arin Montilla MD  Dx:   Encounter Diagnosis     ICD-10-CM    1  Adhesive bursitis of left shoulder M75 02    2  Acute pain of left shoulder M25 512    3  Rotator cuff tendonitis, left M75 82      Subjective: Ernesto Perez reports that her left shoulder is slowly feeling better  She has more strength and endurance but her shoulder still hurts  She does not have a lot of power overhead yet  Objective: See treatment diary below    Assessment: Tolerated treatment well  Patient exhibited good technique with therapeutic exercises and would benefit from continued PT    Plan: Continue per plan of care  Progress treatment as tolerated         Manual  8/10 8/13 8/17     MT, ROM 30' 25' 15'     Supine ROM   10'             Exercise Diary  8/10 8/13 8/17     UBC 10' 10' 10'     FWC-Codman's 5# 30x 5# 30x 5#-30x     FWC-Curls,Tri 5# 30x 5# 30x 5#-30x     Power Web        Digiflex        Finger Ladder 3x 3x 3x     Alec-BP,PD,Lats,Row 25# 30x 25# 30x 35#-30x     Trimax-BP        W/P-PNF,IR,ER,PU,PS,Throw-Top,Mid,Bot 7 5# 30x 7 5# 30x 10#-30x     WP-OH,IR 2x5' 2x5' 2x5'     Wall Slides-IYV 3x10 3x10 3x10     ME, PE 15' 15' 15'             Modalities 8/10 8/13 8/17     MH&ES 15' 10' 20'     US 6' 8' 8'

## 2018-08-20 ENCOUNTER — OFFICE VISIT (OUTPATIENT)
Dept: PHYSICAL THERAPY | Facility: CLINIC | Age: 48
End: 2018-08-20
Payer: COMMERCIAL

## 2018-08-20 DIAGNOSIS — M75.82 ROTATOR CUFF TENDONITIS, LEFT: ICD-10-CM

## 2018-08-20 DIAGNOSIS — M25.512 ACUTE PAIN OF LEFT SHOULDER: ICD-10-CM

## 2018-08-20 DIAGNOSIS — M75.02 ADHESIVE BURSITIS OF LEFT SHOULDER: Primary | ICD-10-CM

## 2018-08-20 PROCEDURE — 97140 MANUAL THERAPY 1/> REGIONS: CPT | Performed by: PHYSICAL THERAPIST

## 2018-08-20 PROCEDURE — 97110 THERAPEUTIC EXERCISES: CPT | Performed by: PHYSICAL THERAPIST

## 2018-08-20 PROCEDURE — 97014 ELECTRIC STIMULATION THERAPY: CPT | Performed by: PHYSICAL THERAPIST

## 2018-08-20 PROCEDURE — 97035 APP MDLTY 1+ULTRASOUND EA 15: CPT | Performed by: PHYSICAL THERAPIST

## 2018-08-20 NOTE — PROGRESS NOTES
Today's date: 2018  Patient name: Kathyleen Harada  : 1970  MRN: [id number]  Referring provider: Kalina Nava MD  Dx:   Encounter Diagnosis     ICD-10-CM    1  Adhesive bursitis of left shoulder M75 02    2  Acute pain of left shoulder M25 512    3  Rotator cuff tendonitis, left M75 82      Subjective: Gordo Daniels reports that her left shoulder is sore today since she had a good stretch this past Friday  Objective: See treatment diary below    Assessment: Tolerated treatment well  Patient exhibited good technique with therapeutic exercises and would benefit from continued PT    Plan: Continue per plan of care  Progress treatment as tolerated         Manual  8/10 8/13 8/17 8/20    MT, ROM 30' 25' 15' 15'    Supine ROM   10' 10'            Exercise Diary  8/10 8/13 8/17 8/20    UBC 10' 10' 10'     FWC-Codman's 5# 30x 5# 30x 5#-30x 5#-30x    FWC-Curls,Tri 5# 30x 5# 30x 5#-30x 5#-30x    Power Web        Digiflex        Finger Ladder 3x 3x 3x 3x    Alec-BP,PD,Lats,Row 25# 30x 25# 30x 35#-30x 35#-30x    Trimax-BP        W/P-PNF,IR,ER,PU,PS,Throw-Top,Mid,Bot 7 5# 30x 7 5# 30x 10#-30x 10#-30x    WP-OH,IR 2x5' 2x5' 2x5' 2x5'    Wall Slides-IYV 3x10 3x10 3x10 3x10    ME, PE 15' 15' 15' 15'            Modalities 8/10 8/13 8/17 8/20    MH&ES 15' 10' 20' 20'    US 6' 8' 8' 8'

## 2018-08-27 ENCOUNTER — OFFICE VISIT (OUTPATIENT)
Dept: PHYSICAL THERAPY | Facility: CLINIC | Age: 48
End: 2018-08-27
Payer: COMMERCIAL

## 2018-08-27 DIAGNOSIS — M25.512 ACUTE PAIN OF LEFT SHOULDER: ICD-10-CM

## 2018-08-27 DIAGNOSIS — M75.02 ADHESIVE BURSITIS OF LEFT SHOULDER: Primary | ICD-10-CM

## 2018-08-27 DIAGNOSIS — M75.82 ROTATOR CUFF TENDONITIS, LEFT: ICD-10-CM

## 2018-08-27 PROCEDURE — 97140 MANUAL THERAPY 1/> REGIONS: CPT | Performed by: PHYSICAL THERAPIST

## 2018-08-27 PROCEDURE — 97035 APP MDLTY 1+ULTRASOUND EA 15: CPT | Performed by: PHYSICAL THERAPIST

## 2018-08-27 PROCEDURE — 97014 ELECTRIC STIMULATION THERAPY: CPT | Performed by: PHYSICAL THERAPIST

## 2018-08-27 PROCEDURE — 97110 THERAPEUTIC EXERCISES: CPT | Performed by: PHYSICAL THERAPIST

## 2018-08-30 ENCOUNTER — OFFICE VISIT (OUTPATIENT)
Dept: PHYSICAL THERAPY | Facility: CLINIC | Age: 48
End: 2018-08-30
Payer: COMMERCIAL

## 2018-08-30 DIAGNOSIS — M75.82 ROTATOR CUFF TENDONITIS, LEFT: ICD-10-CM

## 2018-08-30 DIAGNOSIS — M25.512 ACUTE PAIN OF LEFT SHOULDER: ICD-10-CM

## 2018-08-30 DIAGNOSIS — M75.02 ADHESIVE BURSITIS OF LEFT SHOULDER: Primary | ICD-10-CM

## 2018-08-30 PROCEDURE — 97035 APP MDLTY 1+ULTRASOUND EA 15: CPT

## 2018-08-30 PROCEDURE — 97140 MANUAL THERAPY 1/> REGIONS: CPT

## 2018-08-30 PROCEDURE — 97014 ELECTRIC STIMULATION THERAPY: CPT

## 2018-08-30 PROCEDURE — 97110 THERAPEUTIC EXERCISES: CPT

## 2018-08-30 NOTE — PROGRESS NOTES
Daily Note     Today's date: 2018  Patient name: Julia No  : 1970  MRN: [id number]  Referring provider: Valerie Hobbs MD  Dx:   Encounter Diagnosis     ICD-10-CM    1  Adhesive bursitis of left shoulder M75 02    2  Acute pain of left shoulder M25 512    3  Rotator cuff tendonitis, left M75 82      Subjective: "My L shoulder is starting to feel better  I'm more bothered by the front of my shoulder than anything "    Objective: See treatment diary below    Assessment: Tolerated treatment well  Patient exhibited good technique with therapeutic exercises and would benefit from continued PT  Patient able to tolerate therapeutic exs well with no c/o increase in symptoms  Patient reports feeling relief of symptoms post tx  Plan: Continue per plan of care  Progress treatment as tolerated        Manual     MT, ROM 25'    15'   Supine ROM 5'    10'           Exercise Diary     UBC        FWC-Codman's 5# 30x    5#-30x   FWC-Curls,Tri 5# 30x    5#-30x   Finger Ladder 3x    3x   Alec-BP,PD,Lats,Row 30# 30x    30#-30x   W/P-PNF,IR,ER,PU,PS,Throw-Top,Mid,Bot 10# 30x    10#-30x   WP-OH,IR 2x5'    2x5'   Wall Slides-IYV 3x10    3x10   ME, PE 15'    15'           Modalities    MH&ES 20'    20'   US 8'    8'

## 2018-09-04 ENCOUNTER — TRANSCRIBE ORDERS (OUTPATIENT)
Dept: ADMINISTRATIVE | Facility: HOSPITAL | Age: 48
End: 2018-09-04

## 2018-09-04 DIAGNOSIS — Z12.39 SCREENING BREAST EXAMINATION: Primary | ICD-10-CM

## 2018-09-05 ENCOUNTER — OFFICE VISIT (OUTPATIENT)
Dept: PHYSICAL THERAPY | Facility: CLINIC | Age: 48
End: 2018-09-05
Payer: COMMERCIAL

## 2018-09-05 DIAGNOSIS — M75.82 ROTATOR CUFF TENDONITIS, LEFT: ICD-10-CM

## 2018-09-05 DIAGNOSIS — M25.512 ACUTE PAIN OF LEFT SHOULDER: ICD-10-CM

## 2018-09-05 DIAGNOSIS — M75.02 ADHESIVE BURSITIS OF LEFT SHOULDER: Primary | ICD-10-CM

## 2018-09-05 PROCEDURE — 97035 APP MDLTY 1+ULTRASOUND EA 15: CPT

## 2018-09-05 PROCEDURE — 97164 PT RE-EVAL EST PLAN CARE: CPT | Performed by: PHYSICAL THERAPIST

## 2018-09-05 PROCEDURE — 97014 ELECTRIC STIMULATION THERAPY: CPT

## 2018-09-05 PROCEDURE — G8985 CARRY GOAL STATUS: HCPCS | Performed by: PHYSICAL THERAPIST

## 2018-09-05 PROCEDURE — G8984 CARRY CURRENT STATUS: HCPCS | Performed by: PHYSICAL THERAPIST

## 2018-09-05 PROCEDURE — 97110 THERAPEUTIC EXERCISES: CPT

## 2018-09-05 PROCEDURE — 97140 MANUAL THERAPY 1/> REGIONS: CPT

## 2018-09-05 NOTE — PROGRESS NOTES
Daily Note     Today's date: 2018  Patient name: Camron Lord  : 1970  MRN: [id number]  Referring provider: Cordelia Leonard MD  Dx:   Encounter Diagnosis     ICD-10-CM    1  Adhesive bursitis of left shoulder M75 02    2  Acute pain of left shoulder M25 512    3  Rotator cuff tendonitis, left M75 82      Subjective: "I feel like my shoulder is definitely getting better  When I lay on my R side I get a pain in my L shoulder blade, but that's the only thing really bothering me at this time "    Objective: See treatment diary below    Assessment: Tolerated treatment well  Patient exhibited good technique with therapeutic exercises and would benefit from continued PT  Patient able to tolerate therapeutic exs well with no c/o increase in symptoms  Patient reports relief os symptoms post tx  Plan: Continue per plan of care  Progress treatment as tolerated        Manual        MT, ROM 25' 25'      Supine ROM 5' 5'              Exercise Diary        UBC  10'      FWC-Codman's 5# 30x 5# 30x      FWC-Curls,Tri 5# 30x 5# 30x      Finger Ladder 3x 3x      Alec-BP,PD,Lats,Row 30# 30x 30# 30x      W/P-PNF,IR,ER,PU,PS,Throw-Top,Mid,Bot 10# 30x 10# 30x      WP-OH,IR 2x5' 2x5'      Wall Slides-IYV 3x10 3x10      ME, PE 15' 15'              Modalities       MH&ES 20' 15'      US 8' 8'

## 2018-09-05 NOTE — LETTER
2018      Zen Avila MD  [facility] [address] [address]    Patient: Vicente Aadme   YOB: 1970   Date of Visit: 2018     Encounter Diagnosis     ICD-10-CM    1  Adhesive bursitis of left shoulder M75 02    2  Acute pain of left shoulder M25 512    3  Rotator cuff tendonitis, left M75 82        Dear Dr Glen Dumont:    Please review the attached Plan of Care from [facility] recent visit  Please verify that you agree therapy should continue by signing the attached document and sending it back to our office  If you have any questions or concerns, please don't hesitate to call  Sincerely,    Florencia Dejesus PT      Referring Provider:      I certify that I have read the below Plan of Care and certify the need for these services furnished under this plan of treatment while under my care  Zen Avila MD  [facility] [address]: [phone]          Daily Note     Today's date: 2018  Patient name: Vicente Adame  : 1970  MRN: [id number]  Referring provider: Gladis Bailey MD  Dx:   Encounter Diagnosis     ICD-10-CM    1  Adhesive bursitis of left shoulder M75 02    2  Acute pain of left shoulder M25 512    3  Rotator cuff tendonitis, left M75 82      Subjective: "I feel like my shoulder is definitely getting better  When I lay on my R side I get a pain in my L shoulder blade, but that's the only thing really bothering me at this time "    Objective: See treatment diary below    Assessment: Tolerated treatment well  Patient exhibited good technique with therapeutic exercises and would benefit from continued PT  Patient able to tolerate therapeutic exs well with no c/o increase in symptoms  Patient reports relief os symptoms post tx  Plan: Continue per plan of care  Progress treatment as tolerated        Manual        MT, ROM 25' 25'      Supine ROM 5' 5'              Exercise Diary   UBC  10'      FWC-Codman's 5# 30x 5# 30x      FWC-Curls,Tri 5# 30x 5# 30x      Finger Ladder 3x 3x      Alec-BP,PD,Lats,Row 30# 30x 30# 30x      W/P-PNF,IR,ER,PU,PS,Throw-Top,Mid,Bot 10# 30x 10# 30x      WP-OH,IR 2x5' 2x5'      Wall Slides-IYV 3x10 3x10      ME, PE 15' 15'              Modalities       MH&ES 20' 15'      US 8' 8'                          PT Re-Evaluation     Today's date: 2018  Patient name: Trinity Vazquez  : 1970  MRN: [id number]  Referring provider: Varun Duke MD  Dx:   Encounter Diagnosis     ICD-10-CM    1  Adhesive bursitis of left shoulder M75 02    2  Acute pain of left shoulder M25 512    3   Rotator cuff tendonitis, left M75 82      Start Time: 0740  Stop Time: 0915  Total time in clinic (min): 95 minutes    Assessment  Impairments: abnormal or restricted ROM, abnormal movement, activity intolerance, impaired physical strength, pain with function and safety issue  Understanding of Dx/Px/POC: excellent  Goals  STG 2-4 weeks  Increase UE strength by 3-6 lbs  Decrease pain by 1-2 levels on 1-10 pain scale  Increase UE PROM by 10-15 Degrees in all planes    LTG 6-8 weeks  Increase UE strength 10-20 lbs  Demonstrate UE AROM WFL all planes  Decrease pain to <2/10 with activity  Patient independent with HEP      Plan  Planned modality interventions: ultrasound and unattended electrical stimulation  Planned therapy interventions: manual therapy, joint mobilization, postural training, patient education, strengthening, stretching, therapeutic activities, therapeutic exercise, therapeutic training, flexibility and coordination  Frequency: 3x week  Duration in weeks: 6  Treatment plan discussed with: patient      Subjective Evaluation    Pain  Aggravating factors: sitting, stair climbing, standing, walking, overhead activity, keyboarding and lifting  Progression: improved    Treatments  Current treatment: physical therapy  Patient Goals  Patient goals for therapy: decreased pain, increased motion, return to work, return to [employer] activities, increased strength and independence with ADLs/IADLs        Objective    Precautions: Left Shoulder pain and limitations  Date of onset:  4/11/18     Date of Surgery:  None    History of Present Episode: 7/11/2018  Rhys Tidwell states that over the past few months her left shoulder has flared up  She is really flared up this past week  Past Medical History:    7/11/2018  Rhys Tidwell reports no past medical history  Previous Level of Functional Ability:  7/11/2018  Rhys Tidwell states her left shoulder was fine before these current issues with no limitations  Inspection / Palpation:  7/11/2018  Mesomorphic body type  No signs of infection  No signs of wounds  No signs of drainage  No signs of ecchymotic regions  No signs of erythremic regions  Moderate signs of muscle spasm  Moderate signs of muscle guarding  Moderate signs of tenderness reported to palpation  No signs of swelling  No signs of a surgery site  Current conditions appear consistent with recent episode  Chief Complaints:  7/11/2018  Rhys Tidwell reports moderate difficulty with bending her left shoulder  Rhys Tidwell reports moderate difficulty with movement of her left shoulder  Rhys Tidwell reports mild to moderate difficulty with use of her left arm  Rhys Tidwell reports moderate difficulty with lifting / elevating her left arm  Rhys Tidwell reports mild to moderate difficulty with sleeping  Rhys Tidwell reports moderate difficulty with her strength and endurance  Rhys Tidwell reports moderate limitations with her left shoulder range of motion  Rhys Tidwell reports moderate to severe difficulty lying on her left shoulder region      SHOULDER PAIN Resting Palpation Moving Lifting Elevating   7/11/2018 Rt 0 0 0 0 0   7/11/2018 Lt  0-1 0-2 0-5 0-7 2-7   9/5/18 Lt 0-1 0-1 0-2 0-3 1-4     SHOULDER PAIN Sleeping Throwing Pushing Pulling Twisting   7/11/2018 Rt 0 0 0 0 0   7/11/2018 Lt  0-2 NA 0-2 0-2 0-2   9/5/18 Lt 0-1 3-7 0-1 0-1 0-1     SHOULDER AROM Flexion Extension Abduction   7/11/2018 Rt 180° 65° 180°   7/11/2018 Lt 135° 40° 135°   9/5/18 Lt 164° 56° 164°     SHOULDER AROM Hor Add Ext Rotation Int Rotation   7/11/2018 Rt 75° 95° 95°   7/11/2018 Lt 45° 65° 52°   9/5/18 Lt 62° 82° 92°     SHLD MMT Flexion Extension Abduction   7/11/2018 Rt 0/10  28 lbs 0/10  27 lbs 0/10  29 lbs   7/11/2018 Lt 1/10  14 lbs 1/10  15 lbs 3/10  8 lbs   9/5/18 Lt 1/10  19 lbs 1/10  22 lbs 2/10  17 lbs     SHLD MMT Hor Add Ext Rotation Int Rotation   7/11/2018 Rt 0/10  29 lbs 0/10  28 lbs 0/10  29 lbs   7/11/2018 Lt 0/10  12 lbs 3/10  9 lbs 0/10  10 lbs   9/5/18 Lt 0/10  20 lbs 2/10  19 lbs 0/10  19 lbs     Shoulder Screen Rotator Cuff Apprehension Anterior  Stability Posterior  Stability   Right Negative Negative Negative Negative   Left POSITIVE Negative Negative Negative     Shoulder Screen AC Joint SC Joint Drop Arm Adhesive Capsulitis   Right Negative Negative Negative Negative   Left Negative Negative POSITIVE POSITIVE

## 2018-09-05 NOTE — PROGRESS NOTES
PT Re-Evaluation     Today's date: 2018  Patient name: Alissa Murguia  : 1970  MRN: [id number]  Referring provider: Gabby Medrano MD  Dx:   Encounter Diagnosis     ICD-10-CM    1  Adhesive bursitis of left shoulder M75 02    2  Acute pain of left shoulder M25 512    3  Rotator cuff tendonitis, left M75 82      Start Time: 0740  Stop Time: 0915  Total time in clinic (min): 95 minutes    Assessment  Impairments: abnormal or restricted ROM, abnormal movement, activity intolerance, impaired physical strength, pain with function and safety issue  Understanding of Dx/Px/POC: excellent  Goals  STG 2-4 weeks  Increase UE strength by 3-6 lbs  Decrease pain by 1-2 levels on 1-10 pain scale  Increase UE PROM by 10-15 Degrees in all planes    LTG 6-8 weeks  Increase UE strength 10-20 lbs  Demonstrate UE AROM WFL all planes  Decrease pain to <2/10 with activity  Patient independent with HEP      Plan  Planned modality interventions: ultrasound and unattended electrical stimulation  Planned therapy interventions: manual therapy, joint mobilization, postural training, patient education, strengthening, stretching, therapeutic activities, therapeutic exercise, therapeutic training, flexibility and coordination  Frequency: 3x week  Duration in weeks: 6  Treatment plan discussed with: patient      Subjective Evaluation    Pain  Aggravating factors: sitting, stair climbing, standing, walking, overhead activity, keyboarding and lifting  Progression: improved    Treatments  Current treatment: physical therapy  Patient Goals  Patient goals for therapy: decreased pain, increased motion, return to work, return to sport/leisure activities, increased strength and independence with ADLs/IADLs        Objective    Precautions: Left Shoulder pain and limitations      Date of onset:  18     Date of Surgery:  None    History of Present Episode: 2018  Mirella Garner states that over the past few months her left shoulder has flared up   She is really flared up this past week  Past Medical History:    7/11/2018  Kesha Whitt reports no past medical history  Previous Level of Functional Ability:  7/11/2018  Kesha Whitt states her left shoulder was fine before these current issues with no limitations  Inspection / Palpation:  7/11/2018  Mesomorphic body type  No signs of infection  No signs of wounds  No signs of drainage  No signs of ecchymotic regions  No signs of erythremic regions  Moderate signs of muscle spasm  Moderate signs of muscle guarding  Moderate signs of tenderness reported to palpation  No signs of swelling  No signs of a surgery site  Current conditions appear consistent with recent episode  Chief Complaints:  7/11/2018  Kesha Whitt reports moderate difficulty with bending her left shoulder  Kesha Whitt reports moderate difficulty with movement of her left shoulder  Kesha Whitt reports mild to moderate difficulty with use of her left arm  Kesha Whitt reports moderate difficulty with lifting / elevating her left arm  Kesha Whitt reports mild to moderate difficulty with sleeping  Kesha Whitt reports moderate difficulty with her strength and endurance  Kesha Whitt reports moderate limitations with her left shoulder range of motion  Kesha Whitt reports moderate to severe difficulty lying on her left shoulder region      SHOULDER PAIN Resting Palpation Moving Lifting Elevating   7/11/2018 Rt 0 0 0 0 0   7/11/2018 Lt  0-1 0-2 0-5 0-7 2-7   9/5/18 Lt 0-1 0-1 0-2 0-3 1-4     SHOULDER PAIN Sleeping Throwing Pushing Pulling Twisting   7/11/2018 Rt 0 0 0 0 0   7/11/2018 Lt  0-2 NA 0-2 0-2 0-2   9/5/18 Lt 0-1 3-7 0-1 0-1 0-1     SHOULDER AROM Flexion Extension Abduction   7/11/2018 Rt 180° 65° 180°   7/11/2018 Lt 135° 40° 135°   9/5/18 Lt 164° 56° 164°     SHOULDER AROM Hor Add Ext Rotation Int Rotation   7/11/2018 Rt 75° 95° 95°   7/11/2018 Lt 45° 65° 52°   9/5/18 Lt 62° 82° 92°     SHLD MMT Flexion Extension Abduction   7/11/2018 Rt 0/10  28 lbs 0/10 27 lbs 0/10  29 lbs   7/11/2018 Lt 1/10  14 lbs 1/10  15 lbs 3/10  8 lbs   9/5/18 Lt 1/10  19 lbs 1/10  22 lbs 2/10  17 lbs     SHLD MMT Hor Add Ext Rotation Int Rotation   7/11/2018 Rt 0/10  29 lbs 0/10  28 lbs 0/10  29 lbs   7/11/2018 Lt 0/10  12 lbs 3/10  9 lbs 0/10  10 lbs   9/5/18 Lt 0/10  20 lbs 2/10  19 lbs 0/10  19 lbs     Shoulder Screen Rotator Cuff Apprehension Anterior  Stability Posterior  Stability   Right Negative Negative Negative Negative   Left POSITIVE Negative Negative Negative     Shoulder Screen AC Joint SC Joint Drop Arm Adhesive Capsulitis   Right Negative Negative Negative Negative   Left Negative Negative POSITIVE POSITIVE

## 2018-09-05 NOTE — LETTER
2018    Lorena Joshi MD  [facility] [address] [address]    Patient: Erlinda Cheung   YOB: 1970   Date of Visit: 2018     Encounter Diagnosis     ICD-10-CM    1  Adhesive bursitis of left shoulder M75 02    2  Acute pain of left shoulder M25 512    3  Rotator cuff tendonitis, left M75 82        Dear Dr Mario Canchola:    Please review the attached Plan of Care from [facility] recent visit  Please verify that you agree therapy should continue by signing the attached document and sending it back to our office  If you have any questions or concerns, please don't hesitate to call  Sincerely,    Enrique Bañuelos PT      Referring Provider:      I certify that I have read the below Plan of Care and certify the need for these services furnished under this plan of treatment while under my care  Lorena Joshi MD  [address] [place] [address] [address]: [phone]          Daily Note     Today's date: 2018  Patient name: Erlinda Cheung  : 1970  MRN: [id number]  Referring provider: Isai Kan MD  Dx:   Encounter Diagnosis     ICD-10-CM    1  Adhesive bursitis of left shoulder M75 02    2  Acute pain of left shoulder M25 512    3  Rotator cuff tendonitis, left M75 82      Subjective: "I feel like my shoulder is definitely getting better  When I lay on my R side I get a pain in my L shoulder blade, but that's the only thing really bothering me at this time "    Objective: See treatment diary below    Assessment: Tolerated treatment well  Patient exhibited good technique with therapeutic exercises and would benefit from continued PT  Patient able to tolerate therapeutic exs well with no c/o increase in symptoms  Patient reports relief os symptoms post tx  Plan: Continue per plan of care  Progress treatment as tolerated        Manual        MT, ROM 25' 25'      Supine ROM 5' 5'              Exercise Diary         UBC  10'      FWC-Codman's 5# 30x 5# 30x      FWC-Curls,Tri 5# 30x 5# 30x      Finger Ladder 3x 3x      Alec-BP,PD,Lats,Row 30# 30x 30# 30x      W/P-PNF,IR,ER,PU,PS,Throw-Top,Mid,Bot 10# 30x 10# 30x      WP-OH,IR 2x5' 2x5'      Wall Slides-IYV 3x10 3x10      ME, PE 15' 15'              Modalities       MH&ES 20' 15'      US 8' 8'                          PT Re-Evaluation     Today's date: 2018  Patient name: Mary Ellen Rodriguez  : 1970  MRN: [id number]  Referring provider: Meg Jose MD  Dx:   Encounter Diagnosis     ICD-10-CM    1  Adhesive bursitis of left shoulder M75 02    2  Acute pain of left shoulder M25 512    3   Rotator cuff tendonitis, left M75 82      Start Time: 0740  Stop Time: 0915  Total time in clinic (min): 95 minutes    Assessment  Impairments: abnormal or restricted ROM, abnormal movement, activity intolerance, impaired physical strength, pain with function and safety issue  Understanding of Dx/Px/POC: excellent  Goals  STG 2-4 weeks  Increase UE strength by 3-6 lbs  Decrease pain by 1-2 levels on 1-10 pain scale  Increase UE PROM by 10-15 Degrees in all planes    LTG 6-8 weeks  Increase UE strength 10-20 lbs  Demonstrate UE AROM WFL all planes  Decrease pain to <2/10 with activity  Patient independent with HEP      Plan  Planned modality interventions: ultrasound and unattended electrical stimulation  Planned therapy interventions: manual therapy, joint mobilization, postural training, patient education, strengthening, stretching, therapeutic activities, therapeutic exercise, therapeutic training, flexibility and coordination  Frequency: 3x week  Duration in weeks: 6  Treatment plan discussed with: patient      Subjective Evaluation    Pain  Aggravating factors: sitting, stair climbing, standing, walking, overhead activity, keyboarding and lifting  Progression: improved    Treatments  Current treatment: physical therapy  Patient Goals  Patient goals for therapy: decreased pain, increased motion, return to work, return to [employer] activities, increased strength and independence with ADLs/IADLs        Objective    Precautions: Left Shoulder pain and limitations  Date of onset:  4/11/18     Date of Surgery:  None    History of Present Episode: 7/11/2018  Roney mccormick states that over the past few months her left shoulder has flared up  She is really flared up this past week  Past Medical History:    7/11/2018  Roney mccormick reports no past medical history  Previous Level of Functional Ability:  7/11/2018  Roney mccormick states her left shoulder was fine before these current issues with no limitations  Inspection / Palpation:  7/11/2018  Mesomorphic body type  No signs of infection  No signs of wounds  No signs of drainage  No signs of ecchymotic regions  No signs of erythremic regions  Moderate signs of muscle spasm  Moderate signs of muscle guarding  Moderate signs of tenderness reported to palpation  No signs of swelling  No signs of a surgery site  Current conditions appear consistent with recent episode  Chief Complaints:  7/11/2018  Roney mccormick reports moderate difficulty with bending her left shoulder  Roney mccormick reports moderate difficulty with movement of her left shoulder  El jace reports mild to moderate difficulty with use of her left arm  El jace reports moderate difficulty with lifting / elevating her left arm  El jace reports mild to moderate difficulty with sleeping  El jace reports moderate difficulty with her strength and endurance  Roney mccormick reports moderate limitations with her left shoulder range of motion  Roney mccormick reports moderate to severe difficulty lying on her left shoulder region      SHOULDER PAIN Resting Palpation Moving Lifting Elevating   7/11/2018 Rt 0 0 0 0 0   7/11/2018 Lt  0-1 0-2 0-5 0-7 2-7   9/5/18 Lt 0-1 0-1 0-2 0-3 1-4     SHOULDER PAIN Sleeping Throwing Pushing Pulling Twisting   7/11/2018 Rt 0 0 0 0 0   7/11/2018 Lt  0-2 NA 0-2 0-2 0-2   9/5/18 Lt 0-1 3-7 0-1 0-1 0-1     SHOULDER AROM Flexion Extension Abduction   7/11/2018 Rt 180° 65° 180°   7/11/2018 Lt 135° 40° 135°   9/5/18 Lt 164° 56° 164°     SHOULDER AROM Hor Add Ext Rotation Int Rotation   7/11/2018 Rt 75° 95° 95°   7/11/2018 Lt 45° 65° 52°   9/5/18 Lt 62° 82° 92°     SHLD MMT Flexion Extension Abduction   7/11/2018 Rt 0/10  28 lbs 0/10  27 lbs 0/10  29 lbs   7/11/2018 Lt 1/10  14 lbs 1/10  15 lbs 3/10  8 lbs   9/5/18 Lt 1/10  19 lbs 1/10  22 lbs 2/10  17 lbs     SHLD MMT Hor Add Ext Rotation Int Rotation   7/11/2018 Rt 0/10  29 lbs 0/10  28 lbs 0/10  29 lbs   7/11/2018 Lt 0/10  12 lbs 3/10  9 lbs 0/10  10 lbs   9/5/18 Lt 0/10  20 lbs 2/10  19 lbs 0/10  19 lbs     Shoulder Screen Rotator Cuff Apprehension Anterior  Stability Posterior  Stability   Right Negative Negative Negative Negative   Left POSITIVE Negative Negative Negative     Shoulder Screen AC Joint SC Joint Drop Arm Adhesive Capsulitis   Right Negative Negative Negative Negative   Left Negative Negative POSITIVE POSITIVE

## 2018-09-11 ENCOUNTER — OFFICE VISIT (OUTPATIENT)
Dept: PHYSICAL THERAPY | Facility: CLINIC | Age: 48
End: 2018-09-11
Payer: COMMERCIAL

## 2018-09-11 DIAGNOSIS — M75.82 ROTATOR CUFF TENDONITIS, LEFT: ICD-10-CM

## 2018-09-11 DIAGNOSIS — M75.02 ADHESIVE BURSITIS OF LEFT SHOULDER: Primary | ICD-10-CM

## 2018-09-11 DIAGNOSIS — M25.512 ACUTE PAIN OF LEFT SHOULDER: ICD-10-CM

## 2018-09-11 PROCEDURE — 97035 APP MDLTY 1+ULTRASOUND EA 15: CPT

## 2018-09-11 PROCEDURE — 97110 THERAPEUTIC EXERCISES: CPT

## 2018-09-11 PROCEDURE — 97140 MANUAL THERAPY 1/> REGIONS: CPT

## 2018-09-11 NOTE — PROGRESS NOTES
Daily Note     Today's date: 2018  Patient name: Amee Fisher  : 1970  MRN: [id number]  Referring provider: Johny Potter MD  Dx:   Encounter Diagnosis     ICD-10-CM    1  Adhesive bursitis of left shoulder M75 02    2  Acute pain of left shoulder M25 512    3  Rotator cuff tendonitis, left M75 82      Subjective: No new c/o pain today  "I had relief in my L shoulder from Thursday when I left therapy until  "    Objective: See treatment diary below    Assessment: Tolerated treatment well  Patient exhibited good technique with therapeutic exercises and would benefit from continued PT  Patient able to tolerate therapeutic ex program well  Spasm present in L posterior shoulder  Patient reports feeling relief of spasm/tightness and increased AROM in L shoulder post tx  Plan: Continue per plan of care  Progress treatment as tolerated        Manual       MT, ROM 25' 25' 25'     Supine ROM 5' 5' 5'             Exercise Diary       UBC  10' 10'     FWC-Codman's 5# 30x 5# 30x 5# 30x     FWC-Curls,Tri 5# 30x 5# 30x 5# 30x     Finger Ladder 3x 3x 3x     Alec-BP,PD,Lats,Row 30# 30x 30# 30x 30# 30x     W/P-PNF,IR,ER,PU,PS,Throw-Top,Mid,Bot 10# 30x 10# 30x 10# 30x     WP-OH,IR 2x5' 2x5' 2x5'     Wall Slides-IYV 3x10 3x10 3x10     ME, PE 15' 15' 15'             Modalities      MH&ES 20' 15' NT     US 8' 8' 8'

## 2018-09-18 ENCOUNTER — OFFICE VISIT (OUTPATIENT)
Dept: PHYSICAL THERAPY | Facility: CLINIC | Age: 48
End: 2018-09-18
Payer: COMMERCIAL

## 2018-09-18 DIAGNOSIS — M75.02 ADHESIVE BURSITIS OF LEFT SHOULDER: Primary | ICD-10-CM

## 2018-09-18 DIAGNOSIS — M25.512 ACUTE PAIN OF LEFT SHOULDER: ICD-10-CM

## 2018-09-18 DIAGNOSIS — M75.82 ROTATOR CUFF TENDONITIS, LEFT: ICD-10-CM

## 2018-09-18 PROCEDURE — 97035 APP MDLTY 1+ULTRASOUND EA 15: CPT | Performed by: PHYSICAL THERAPIST

## 2018-09-18 PROCEDURE — 97110 THERAPEUTIC EXERCISES: CPT | Performed by: PHYSICAL THERAPIST

## 2018-09-18 PROCEDURE — 97140 MANUAL THERAPY 1/> REGIONS: CPT | Performed by: PHYSICAL THERAPIST

## 2018-09-18 PROCEDURE — 97014 ELECTRIC STIMULATION THERAPY: CPT | Performed by: PHYSICAL THERAPIST

## 2018-09-18 NOTE — PROGRESS NOTES
Today's date: 2018  Patient name: Enriqueta Tobin  : 1970  MRN: [id number]  Referring provider: Katrina Viramontes MD  Dx:   Encounter Diagnosis     ICD-10-CM    1  Adhesive bursitis of left shoulder M75 02    2  Acute pain of left shoulder M25 512    3  Rotator cuff tendonitis, left M75 82      Subjective: Loren Lauren reports that her left shoulder is slowly feeling better  She can perform more chores at home  Objective: See treatment diary below    Assessment: Tolerated treatment well  Patient exhibited good technique with therapeutic exercises and would benefit from continued PT    Plan: Continue per plan of care  Progress treatment as tolerated         Manual      MT, ROM 25' 25' 25' 25'    Supine ROM 5' 5' 5' 5'            Exercise Diary      UBC  10' 10' 10'    FWC-Codman's 5# 30x 5# 30x 5# 30x 5#-30x    FWC-Curls,Tri 5# 30x 5# 30x 5# 30x 5#-30x    Finger Ladder 3x 3x 3x 3x    Alec-BP,PD,Lats,Row 30# 30x 30# 30x 30# 30x 30#-30x    W/P-PNF,IR,ER,PU,PS,Throw-Top,Mid,Bot 10# 30x 10# 30x 10# 30x 10#-30x    WP-OH,IR 2x5' 2x5' 2x5' 2'5'    Wall Slides-IYV 3x10 3x10 3x10 3x10    ME, PE 15' 15' 15' 15'            Modalities     MH&ES 20' 15' NT 20'    US 8' 8' 8' 15'

## 2018-09-26 ENCOUNTER — OFFICE VISIT (OUTPATIENT)
Dept: PHYSICAL THERAPY | Facility: CLINIC | Age: 48
End: 2018-09-26
Payer: COMMERCIAL

## 2018-09-26 DIAGNOSIS — M25.512 ACUTE PAIN OF LEFT SHOULDER: ICD-10-CM

## 2018-09-26 DIAGNOSIS — M75.82 ROTATOR CUFF TENDONITIS, LEFT: ICD-10-CM

## 2018-09-26 DIAGNOSIS — M75.02 ADHESIVE BURSITIS OF LEFT SHOULDER: Primary | ICD-10-CM

## 2018-09-26 PROCEDURE — 97110 THERAPEUTIC EXERCISES: CPT | Performed by: PHYSICAL THERAPIST

## 2018-09-26 PROCEDURE — 97014 ELECTRIC STIMULATION THERAPY: CPT | Performed by: PHYSICAL THERAPIST

## 2018-09-26 PROCEDURE — 97140 MANUAL THERAPY 1/> REGIONS: CPT | Performed by: PHYSICAL THERAPIST

## 2018-09-26 PROCEDURE — 97035 APP MDLTY 1+ULTRASOUND EA 15: CPT | Performed by: PHYSICAL THERAPIST

## 2018-09-26 NOTE — PROGRESS NOTES
Today's date: 2018  Patient name: Vicente Adame  : 1970  MRN: [id number]  Referring provider: Gladis Bailey MD  Dx:   Encounter Diagnosis     ICD-10-CM    1  Adhesive bursitis of left shoulder M75 02    2  Acute pain of left shoulder M25 512    3  Rotator cuff tendonitis, left M75 82      Subjective: Elvia Garcia reports that her left continues to slowly improve  Some days are still worse than others days but on the average she continues to improve  Objective: See treatment diary below    Assessment: Tolerated treatment well  Patient exhibited good technique with therapeutic exercises and would benefit from continued PT    Plan: Continue per plan of care  Progress treatment as tolerated         Manual     MT, ROM 25' 25' 25' 25' 15'   Supine ROM 5' 5' 5' 5' 5'           Exercise Diary     UBC  10' 10' 10' 10'   FWC-Codman's 5# 30x 5# 30x 5# 30x 5#-30x 5#-30x   FWC-Curls,Tri 5# 30x 5# 30x 5# 30x 5#-30x 5#-30x   Finger Ladder 3x 3x 3x 3x 3x   Alec-BP,PD,Lats,Row 30# 30x 30# 30x 30# 30x 30#-30x 30#-30x   W/P-PNF,IR,ER,PU,PS,Throw-Top,Mid,Bot 10# 30x 10# 30x 10# 30x 10#-30x 10#-30x   WP-OH,IR 2x5' 2x5' 2x5' 2'5' 2x5'   Wall Slides-IYV 3x10 3x10 3x10 3x10 3x10   ME, PE 15' 15' 15' 15' 15'           Modalities    MH&ES 20' 15' NT 20' 20'   US 8' 8' 8' 15' 15'

## 2018-10-01 ENCOUNTER — APPOINTMENT (OUTPATIENT)
Dept: PHYSICAL THERAPY | Facility: CLINIC | Age: 48
End: 2018-10-01
Payer: COMMERCIAL

## 2018-10-04 ENCOUNTER — OFFICE VISIT (OUTPATIENT)
Dept: PHYSICAL THERAPY | Facility: CLINIC | Age: 48
End: 2018-10-04
Payer: COMMERCIAL

## 2018-10-04 DIAGNOSIS — M25.512 ACUTE PAIN OF LEFT SHOULDER: ICD-10-CM

## 2018-10-04 DIAGNOSIS — M75.02 ADHESIVE BURSITIS OF LEFT SHOULDER: Primary | ICD-10-CM

## 2018-10-04 DIAGNOSIS — M75.82 ROTATOR CUFF TENDONITIS, LEFT: ICD-10-CM

## 2018-10-04 PROCEDURE — G8985 CARRY GOAL STATUS: HCPCS | Performed by: PHYSICAL THERAPIST

## 2018-10-04 PROCEDURE — 97140 MANUAL THERAPY 1/> REGIONS: CPT

## 2018-10-04 PROCEDURE — 97164 PT RE-EVAL EST PLAN CARE: CPT | Performed by: PHYSICAL THERAPIST

## 2018-10-04 PROCEDURE — G8984 CARRY CURRENT STATUS: HCPCS | Performed by: PHYSICAL THERAPIST

## 2018-10-04 PROCEDURE — 97110 THERAPEUTIC EXERCISES: CPT

## 2018-10-04 NOTE — LETTER
2018    Sanket Rojas MD  [address] [address]    Patient: Kimberly Forbes   YOB: 1970   Date of Visit: 10/4/2018     Encounter Diagnosis     ICD-10-CM    1  Adhesive bursitis of left shoulder M75 02    2  Acute pain of left shoulder M25 512    3  Rotator cuff tendonitis, left M75 82        Dear Dr Luis A Bonilla:    Please review the attached Plan of Care from [facility] recent visit  Please verify that you agree therapy should continue by signing the attached document and sending it back to our office  If you have any questions or concerns, please don't hesitate to call  Sincerely,    Edilma Reyez PT      Referring Provider:      I certify that I have read the below Plan of Care and certify the need for these services furnished under this plan of treatment while under my care  Sanket Rojas MD  [facility] [address]: [phone]          Daily Note     Today's date: 10/4/2018  Patient name: Kimberly Forbes  : 1970  MRN: [id number]  Referring provider: Skip Ingram MD  Dx:   Encounter Diagnosis     ICD-10-CM    1  Adhesive bursitis of left shoulder M75 02    2  Acute pain of left shoulder M25 512    3  Rotator cuff tendonitis, left M75 82      Subjective: "My L shoulder is feeling much better "    Objective: See treatment diary below    Assessment: Tolerated treatment well  Patient exhibited good technique with therapeutic exercises and would benefit from continued PT to increase L shoulder strength and ROM  Plan: Continue per plan of care  Progress treatment as tolerated        Manual  10/4    9/26   MT, ROM 20'    15'   Supine ROM 10'    5'           Exercise Diary  10/4    9/26   UBC     10'   FWC-Codman's 5# 30x    5#-30x   FWC-Curls,Tri 5# 30x    5#-30x   Finger Ladder 3x ea    3x   Alec-BP,PD,Lats,Row     30#-30x   W/P-PNF,IR,ER,PU,PS,Throw-Top,Mid,Bot     10#-30x   WP-OH,IR 2x5' 2x5'   Wall Slides-IYV     3x10   ME, PE 15'    15'           Modalities 10/4    9/26   MH&ES NT    20'   US NT    15'                   PT Re-Evaluation     Today's date: 10/4/2018  Patient name: Sarah Escobedo  : 1970  MRN: [id number]  Referring provider: Giovani Thrasher MD  Dx:   Encounter Diagnosis     ICD-10-CM    1  Adhesive bursitis of left shoulder M75 02    2  Acute pain of left shoulder M25 512    3  Rotator cuff tendonitis, left M75 82      Start Time: 730  Stop Time: 830  Total time in clinic (min): 60 minutes    Assessment  Impairments: abnormal or restricted ROM, impaired physical strength, pain with function and poor body mechanics    Assessment details: Slowly recovering from shoulder sprain / strain / inflammation    Progressing very well with treatment and home exercise program   Understanding of Dx/Px/POC: excellent  Plan  Planned modality interventions: ultrasound and unattended electrical stimulation  Planned therapy interventions: joint mobilization, manual therapy, patient education, self care, strengthening, stretching, therapeutic exercise, therapeutic training, therapeutic activities and coordination  Frequency: 3x week  Duration in weeks: 6  Treatment plan discussed with: patient      Subjective Evaluation    Pain  Quality: discomfort, sharp, tight, dull ache and pressure  Aggravating factors: overhead activity  Progression: improved    Treatments  Current treatment: physical therapy  Patient Goals  Patient goals for therapy: decreased pain, increased motion, return to work, return to [employer] activities, independence with ADLs/IADLs and increased strength        Objective    Flowsheet Rows      Most Recent Value   PT/OT G-Codes   Assessment Type  Re-evaluation   G code set  Carrying, Moving & Handling Objects   Carrying, Moving and Handling Objects Current Status ()  CI   Carrying, Moving and Handling Objects Goal Status ()  [address]        Precautions: Left Shoulder pain and limitations  Date of onset:  4/11/18     Date of Surgery:  None    History of Present Episode: 7/11/2018  Kylee Montanez states that over the past few months her left shoulder has flared up  She is really flared up this past week  Past Medical History:    7/11/2018  Kylee Montanez reports no past medical history  Previous Level of Functional Ability:  7/11/2018  Kylee Montanez states her left shoulder was fine before these current issues with no limitations  Inspection / Palpation:  7/11/2018  Mesomorphic body type  No signs of infection  No signs of wounds  No signs of drainage  No signs of ecchymotic regions  No signs of erythremic regions  Moderate signs of muscle spasm  Moderate signs of muscle guarding  Moderate signs of tenderness reported to palpation  No signs of swelling  No signs of a surgery site  Current conditions appear consistent with recent episode  Chief Complaints:  7/11/2018  Kylee Montanez reports moderate difficulty with bending her left shoulder  Kylee Montanez reports moderate difficulty with movement of her left shoulder  Kylee Montanez reports mild to moderate difficulty with use of her left arm  Kylee Montanez reports moderate difficulty with lifting / elevating her left arm  Kylee Montanez reports mild to moderate difficulty with sleeping  Kylee Montanez reports moderate difficulty with her strength and endurance  Kylee Montanez reports moderate limitations with her left shoulder range of motion  Kylee Montanez reports moderate to severe difficulty lying on her left shoulder region      SHOULDER PAIN Resting Palpation Moving Lifting Elevating   7/11/2018 Rt 0 0 0 0 0   7/11/2018 Lt  0-1 0-2 0-5 0-7 2-7   9/5/18 Lt 0-1 0-1 0-2 0-3 1-4   10/04/2018 Lt 0-1 0-1 0-1 0-2 0-3     SHOULDER PAIN Sleeping Throwing Pushing Pulling Twisting   7/11/2018 Rt 0 0 0 0 0   7/11/2018 Lt  0-2 NA 0-2 0-2 0-2   9/5/18 Lt 0-1 3-7 0-1 0-1 0-1   10/4/2018 Lt 0-1 2-5 0-1 0-1 0-1     SHOULDER AROM Flexion Extension Abduction   7/11/2018 Rt 180° 65° 180°   7/11/2018 Lt 135° 40° 135°   9/5/18 Lt 164° 56° 164°   10/4/2018 Lt 176° 64° 176°     SHOULDER AROM Hor Add Ext Rotation Int Rotation   7/11/2018 Rt 75° 95° 95°   7/11/2018 Lt 45° 65° 52°   9/5/18 Lt 62° 82° 92°   10/4/2018 Lt 68° 90° 65°     SHLD MMT Flexion Extension Abduction   7/11/2018 Rt 0/10  28 lbs 0/10  27 lbs 0/10  29 lbs   7/11/2018 Lt 1/10  14 lbs 1/10  15 lbs 3/10  8 lbs   9/5/18 Lt 1/10  19 lbs 1/10  22 lbs 2/10  17 lbs   10/4/2018 Lt 1/10 22 lbs 1/10  26 lbs 1/10  20 lbs     SHLD MMT Hor Add Ext Rotation Int Rotation   7/11/2018 Rt 0/10  29 lbs 0/10  28 lbs 0/10  29 lbs   7/11/2018 Lt 0/10  12 lbs 3/10  9 lbs 0/10  10 lbs   9/5/18 Lt 0/10  20 lbs 2/10  19 lbs 0/10  19 lbs   10/4/2018 Lt 0/10  25 lbs 1/10  23 lbs 0/10  22 lbs     Shoulder Screen Rotator Cuff Apprehension Anterior  Stability Posterior  Stability   Right Negative Negative Negative Negative   Left POSITIVE Negative Negative Negative     Shoulder Screen AC Joint SC Joint Drop Arm Adhesive Capsulitis   Right Negative Negative Negative Negative   Left Negative Negative POSITIVE POSITIVE

## 2018-10-04 NOTE — PROGRESS NOTES
Daily Note     Today's date: 10/4/2018  Patient name: Radha Mars  : 1970  MRN: [id number]  Referring provider: Ashley Gil MD  Dx:   Encounter Diagnosis     ICD-10-CM    1  Adhesive bursitis of left shoulder M75 02    2  Acute pain of left shoulder M25 512    3  Rotator cuff tendonitis, left M75 82      Subjective: "My L shoulder is feeling much better "    Objective: See treatment diary below    Assessment: Tolerated treatment well  Patient exhibited good technique with therapeutic exercises and would benefit from continued PT to increase L shoulder strength and ROM  Plan: Continue per plan of care  Progress treatment as tolerated        Manual  10/4    9/26   MT, ROM 20'    15'   Supine ROM 10'    5'           Exercise Diary  10/4    9/26   UBC     10'   FWC-Codman's 5# 30x    5#-30x   FWC-Curls,Tri 5# 30x    5#-30x   Finger Ladder 3x ea    3x   Alec-BP,PD,Lats,Row     30#-30x   W/P-PNF,IR,ER,PU,PS,Throw-Top,Mid,Bot     10#-30x   WP-OH,IR 2x5'    2x5'   Wall Slides-IYV     3x10   ME, PE 15'    15'           Modalities 10/4    9/26   MH&ES NT    20'   US NT    15'

## 2018-10-05 ENCOUNTER — TRANSCRIBE ORDERS (OUTPATIENT)
Dept: PHYSICAL THERAPY | Facility: CLINIC | Age: 48
End: 2018-10-05

## 2018-10-05 DIAGNOSIS — M25.512 ACUTE PAIN OF LEFT SHOULDER: ICD-10-CM

## 2018-10-05 DIAGNOSIS — M75.02 ADHESIVE BURSITIS OF LEFT SHOULDER: Primary | ICD-10-CM

## 2018-10-05 DIAGNOSIS — M75.82 ROTATOR CUFF TENDONITIS, LEFT: ICD-10-CM

## 2018-10-05 NOTE — PROGRESS NOTES
PT Re-Evaluation     Today's date: 10/4/2018  Patient name: Praveena Alanis  : 1970  MRN: [id number]  Referring provider: Arjun Ruiz MD  Dx:   Encounter Diagnosis     ICD-10-CM    1  Adhesive bursitis of left shoulder M75 02    2  Acute pain of left shoulder M25 512    3  Rotator cuff tendonitis, left M75 82      Start Time: 0730  Stop Time: 0830  Total time in clinic (min): 60 minutes    Assessment  Impairments: abnormal or restricted ROM, impaired physical strength, pain with function and poor body mechanics    Assessment details: Slowly recovering from shoulder sprain / strain / inflammation  Progressing very well with treatment and home exercise program   Understanding of Dx/Px/POC: excellent  Plan  Planned modality interventions: ultrasound and unattended electrical stimulation  Planned therapy interventions: joint mobilization, manual therapy, patient education, self care, strengthening, stretching, therapeutic exercise, therapeutic training, therapeutic activities and coordination  Frequency: 3x week  Duration in weeks: 6  Treatment plan discussed with: patient      Subjective Evaluation    Pain  Quality: discomfort, sharp, tight, dull ache and pressure  Aggravating factors: overhead activity  Progression: improved    Treatments  Current treatment: physical therapy  Patient Goals  Patient goals for therapy: decreased pain, increased motion, return to work, return to [employer] activities, independence with ADLs/IADLs and increased strength        Objective    Flowsheet Rows      Most Recent Value   PT/OT G-Codes   Assessment Type  Re-evaluation   G code set  Carrying, Moving & Handling Objects   Carrying, Moving and Handling Objects Current Status ()  CI   Carrying, Moving and Handling Objects Goal Status ()  [facility]        Precautions: Left Shoulder pain and limitations      Date of onset:  18     Date of Surgery:  None    History of Present Episode: 2018  Holly Hutchins states that over the past few months her left shoulder has flared up  She is really flared up this past week  Past Medical History:    7/11/2018  Tomeka Spencer reports no past medical history  Previous Level of Functional Ability:  7/11/2018  Tomeka Spencer states her left shoulder was fine before these current issues with no limitations  Inspection / Palpation:  7/11/2018  Mesomorphic body type  No signs of infection  No signs of wounds  No signs of drainage  No signs of ecchymotic regions  No signs of erythremic regions  Moderate signs of muscle spasm  Moderate signs of muscle guarding  Moderate signs of tenderness reported to palpation  No signs of swelling  No signs of a surgery site  Current conditions appear consistent with recent episode  Chief Complaints:  7/11/2018  Tomeka Spencer reports moderate difficulty with bending her left shoulder  Regjosselyn Spencer reports moderate difficulty with movement of her left shoulder  Regjosselyn Spencer reports mild to moderate difficulty with use of her left arm  Regjosselyn Spencer reports moderate difficulty with lifting / elevating her left arm  Regjosselyn Spencer reports mild to moderate difficulty with sleeping  Regjosselyn Spencer reports moderate difficulty with her strength and endurance  Regjosselyn Spencer reports moderate limitations with her left shoulder range of motion  Tomeka Spencer reports moderate to severe difficulty lying on her left shoulder region      SHOULDER PAIN Resting Palpation Moving Lifting Elevating   7/11/2018 Rt 0 0 0 0 0   7/11/2018 Lt  0-1 0-2 0-5 0-7 2-7   9/5/18 Lt 0-1 0-1 0-2 0-3 1-4   10/04/2018 Lt 0-1 0-1 0-1 0-2 0-3     SHOULDER PAIN Sleeping Throwing Pushing Pulling Twisting   7/11/2018 Rt 0 0 0 0 0   7/11/2018 Lt  0-2 NA 0-2 0-2 0-2   9/5/18 Lt 0-1 3-7 0-1 0-1 0-1   10/4/2018 Lt 0-1 2-5 0-1 0-1 0-1     SHOULDER AROM Flexion Extension Abduction   7/11/2018 Rt 180° 65° 180°   7/11/2018 Lt 135° 40° 135°   9/5/18 Lt 164° 56° 164°   10/4/2018 Lt 176° 64° 176°     SHOULDER AROM Hor Add Ext Rotation Int Rotation   7/11/2018 Rt 75° 95° 95°   7/11/2018 Lt 45° 65° 52°   9/5/18 Lt 62° 82° 92°   10/4/2018 Lt 68° 90° 65°     SHLD MMT Flexion Extension Abduction   7/11/2018 Rt 0/10  28 lbs 0/10  27 lbs 0/10  29 lbs   7/11/2018 Lt 1/10  14 lbs 1/10  15 lbs 3/10  8 lbs   9/5/18 Lt 1/10  19 lbs 1/10  22 lbs 2/10  17 lbs   10/4/2018 Lt 1/10 22 lbs 1/10  26 lbs 1/10  20 lbs     SHLD MMT Hor Add Ext Rotation Int Rotation   7/11/2018 Rt 0/10  29 lbs 0/10  28 lbs 0/10  29 lbs   7/11/2018 Lt 0/10  12 lbs 3/10  9 lbs 0/10  10 lbs   9/5/18 Lt 0/10  20 lbs 2/10  19 lbs 0/10  19 lbs   10/4/2018 Lt 0/10  25 lbs 1/10  23 lbs 0/10  22 lbs     Shoulder Screen Rotator Cuff Apprehension Anterior  Stability Posterior  Stability   Right Negative Negative Negative Negative   Left POSITIVE Negative Negative Negative     Shoulder Screen AC Joint SC Joint Drop Arm Adhesive Capsulitis   Right Negative Negative Negative Negative   Left Negative Negative POSITIVE POSITIVE

## 2018-10-10 ENCOUNTER — OFFICE VISIT (OUTPATIENT)
Dept: PHYSICAL THERAPY | Facility: CLINIC | Age: 48
End: 2018-10-10
Payer: COMMERCIAL

## 2018-10-10 DIAGNOSIS — M75.02 ADHESIVE BURSITIS OF LEFT SHOULDER: Primary | ICD-10-CM

## 2018-10-10 DIAGNOSIS — M25.512 ACUTE PAIN OF LEFT SHOULDER: ICD-10-CM

## 2018-10-10 DIAGNOSIS — M75.82 ROTATOR CUFF TENDONITIS, LEFT: ICD-10-CM

## 2018-10-10 PROCEDURE — 97110 THERAPEUTIC EXERCISES: CPT | Performed by: PHYSICAL THERAPIST

## 2018-10-10 PROCEDURE — 97014 ELECTRIC STIMULATION THERAPY: CPT | Performed by: PHYSICAL THERAPIST

## 2018-10-10 PROCEDURE — 97140 MANUAL THERAPY 1/> REGIONS: CPT | Performed by: PHYSICAL THERAPIST

## 2018-10-10 PROCEDURE — 97035 APP MDLTY 1+ULTRASOUND EA 15: CPT | Performed by: PHYSICAL THERAPIST

## 2018-10-10 NOTE — PROGRESS NOTES
Today's date: 10/10/2018  Patient name: Suellen Madera  : 1970  MRN: [id number]  Referring provider: Chris Cabrales MD  Dx:   Encounter Diagnosis     ICD-10-CM    1  Adhesive bursitis of left shoulder M75 02    2  Acute pain of left shoulder M25 512    3  Rotator cuff tendonitis, left M75 82      Subjective: Anthony Levine reports that her left shoulder is slowly feeling better but is still limtied with end of range movements  Objective: See treatment diary below    Assessment: Tolerated treatment well  Patient exhibited good technique with therapeutic exercises and would benefit from continued PT    Plan: Continue per plan of care  Progress treatment as tolerated         Manual  10/4 10/10   9/26   MT, ROM 20' 15'   15'   Supine ROM 10' 15'   5'           Exercise Diary  10/4 10/10   9/26   UBC  10'   10'   FWC-Codman's 5# 30x 5#-30x   5#-30x   FWC-Curls,Tri 5# 30x 5#-30x   5#-30x   Finger Ladder 3x ea 3x   3x   Alec-BP,PD,Lats,Row  30#-30x   30#-30x   W/P-PNF,IR,ER,PU,PS,Throw-Top,Mid,Bot  10#-30x   10#-30x   WP-OH,IR 2x5' 2x5'   2x5'   Wall Slides-IYV     3x10   ME, PE 15' 15'   15'           Modalities 10/4 10/10   9/26   MH&ES NT 20'   20'   US NT 15'   15'

## 2018-10-17 ENCOUNTER — HOSPITAL ENCOUNTER (OUTPATIENT)
Dept: MAMMOGRAPHY | Facility: MEDICAL CENTER | Age: 48
Discharge: HOME | End: 2018-10-17
Payer: COMMERCIAL

## 2018-10-17 DIAGNOSIS — Z12.39 SCREENING BREAST EXAMINATION: ICD-10-CM

## 2018-10-17 PROCEDURE — 77063 BREAST TOMOSYNTHESIS BI: CPT

## 2018-10-17 PROCEDURE — 77067 SCR MAMMO BI INCL CAD: CPT

## 2018-10-26 ENCOUNTER — OFFICE VISIT (OUTPATIENT)
Dept: PHYSICAL THERAPY | Facility: CLINIC | Age: 48
End: 2018-10-26
Payer: COMMERCIAL

## 2018-10-26 DIAGNOSIS — M25.512 ACUTE PAIN OF LEFT SHOULDER: ICD-10-CM

## 2018-10-26 DIAGNOSIS — M75.82 ROTATOR CUFF TENDONITIS, LEFT: ICD-10-CM

## 2018-10-26 DIAGNOSIS — M75.02 ADHESIVE BURSITIS OF LEFT SHOULDER: Primary | ICD-10-CM

## 2018-10-26 PROCEDURE — 97014 ELECTRIC STIMULATION THERAPY: CPT

## 2018-10-26 PROCEDURE — 97110 THERAPEUTIC EXERCISES: CPT

## 2018-10-26 PROCEDURE — 97140 MANUAL THERAPY 1/> REGIONS: CPT

## 2018-10-26 PROCEDURE — 97035 APP MDLTY 1+ULTRASOUND EA 15: CPT

## 2018-10-26 NOTE — PROGRESS NOTES
Daily Note     Today's date: 10/26/2018  Patient name: Stas Garcia  : 1970  MRN: [id number]  Referring provider: Rubén Tan MD  Dx:   Encounter Diagnosis     ICD-10-CM    1  Adhesive bursitis of left shoulder M75 02    2  Acute pain of left shoulder M25 512    3  Rotator cuff tendonitis, left M75 82      Subjective: "I've joined a gym and have been feeling pretty good, but I still have that pain in the front of my L shoulder "    Objective: See treatment diary below    Assessment: Tolerated treatment well  Patient exhibited good technique with therapeutic exercises and would benefit from continued PT to decrease L shoulder pain  Plan: Continue per plan of care  Progress treatment as tolerated          Manual  10/4 10/10 10/26     MT, ROM 20' 15' 20'     Supine ROM 10' 15' 10'             Exercise Diary  10/4 10/10 10/26     UBC  10'      FWC-Codman's 5# 30x 5#-30x      FWC-Curls,Tri 5# 30x 5#-30x      Finger Ladder 3x ea 3x      Alec-BP,PD,Lats,Row  30#-30x      W/P-PNF,IR,ER,PU,PS,Throw-Top,Mid,Bot  10#-30x 10# 30x     WP-OH,IR 2x5' 2x5' 2x5'     Kemi Coupe        ME, PE 15' 15' 15'             Modalities 10/4 10/10 10/26     MH&ES NT 20' 15'     US NT 15' 10'

## 2018-11-01 ENCOUNTER — OFFICE VISIT (OUTPATIENT)
Dept: PHYSICAL THERAPY | Facility: CLINIC | Age: 48
End: 2018-11-01
Payer: COMMERCIAL

## 2018-11-01 DIAGNOSIS — M75.82 ROTATOR CUFF TENDONITIS, LEFT: ICD-10-CM

## 2018-11-01 DIAGNOSIS — M25.512 ACUTE PAIN OF LEFT SHOULDER: ICD-10-CM

## 2018-11-01 DIAGNOSIS — M75.02 ADHESIVE BURSITIS OF LEFT SHOULDER: Primary | ICD-10-CM

## 2018-11-01 PROCEDURE — 97110 THERAPEUTIC EXERCISES: CPT

## 2018-11-01 PROCEDURE — 97035 APP MDLTY 1+ULTRASOUND EA 15: CPT

## 2018-11-01 PROCEDURE — 97140 MANUAL THERAPY 1/> REGIONS: CPT

## 2018-11-01 PROCEDURE — 97014 ELECTRIC STIMULATION THERAPY: CPT

## 2018-11-01 PROCEDURE — G8985 CARRY GOAL STATUS: HCPCS | Performed by: PHYSICAL THERAPIST

## 2018-11-01 PROCEDURE — G8984 CARRY CURRENT STATUS: HCPCS | Performed by: PHYSICAL THERAPIST

## 2018-11-01 PROCEDURE — 97164 PT RE-EVAL EST PLAN CARE: CPT | Performed by: PHYSICAL THERAPIST

## 2018-11-01 NOTE — LETTER
2018   Plan of  Martinez Post MD Amor  [address] [address]    Patient: Santino Alford   YOB: 1970   Date of Visit: 2018     Encounter Diagnosis     ICD-10-CM    1  Adhesive bursitis of left shoulder M75 02    2  Acute pain of left shoulder M25 512    3  Rotator cuff tendonitis, left M75 82        Dear Dr Waunita Bloch:    Please review the attached Plan of Care from [facility] recent visit  Please verify that you agree therapy should continue by signing the attached document and sending it back to our office  If you have any questions or concerns, please don't hesitate to call  Sincerely,    Romayne Bay, PT      Referring Provider:      I certify that I have read the below Plan of Care and certify the need for these services furnished under this plan of treatment while under my care  Gayle Martinez MD  [address]: [phone]    Please sign and return this page only to fax number [phone]      Daily Note     Today's date: 2018  Patient name: Santino Alford  : 1970  MRN: [id number]  Referring provider: Dora Figueroa MD  Dx:   Encounter Diagnosis     ICD-10-CM    1  Adhesive bursitis of left shoulder M75 02    2  Acute pain of left shoulder M25 512    3  Rotator cuff tendonitis, left M75 82      Subjective: No new c/o pain today  Objective: See treatment diary below    Assessment: Tolerated treatment well  Patient exhibited good technique with therapeutic exercises and would benefit from continued PT to decrease L shoulder pain and to increase L shoulder ROM/strength  Plan: Continue per plan of care  Progress treatment as tolerated        Manual  10/4 10/10 10/26 11/1    MT, ROM 20' 15' 20' 25'    Supine ROM 10' 15' 10' 5'            Exercise Diary  10/4 10/10 10/26 11/1    UBC  10'      FWC-Codman's 5# 30x 5#-30x      FWC-Curls,Tri 5# 30x 5#-30x      Finger Ladder 3x ea 3x      Alec-BP,PD,Lats,Row  30#-30x      W/P-PNF,IR,ER,PU,PS,Throw-Top,Mid,Bot  10#-30x 10# 30x     WP-OH,IR 2x5' 2x5' 2x5' 2x5'    Diallojeannie Vasquez Slides-IYV        ME, PE 15' 15' 15' 15'            Modalities 10/4 10/10 10/26 11/1    MH&ES NT 20' 15' 15'    US NT 15' 10' 10'                      PT Re-Evaluation     Today's date: 2018  Patient name: Eliu Plan  : 1970  MRN: [id number]  Referring provider: Luis Vargas MD  Dx:   Encounter Diagnosis     ICD-10-CM    1  Adhesive bursitis of left shoulder M75 02    2  Acute pain of left shoulder M25 512    3  Rotator cuff tendonitis, left M75 82      Start Time: 0750  Stop Time: 0900  Total time in clinic (min): 70 minutes    Assessment  Impairments: abnormal or restricted ROM, abnormal movement, impaired physical strength and pain with function    Assessment details: Slowly feeling better and able to perform more activities before her shoulder flares up  Understanding of Dx/Px/POC: excellent  Goals  STG 2-4 weeks:   Increase UE strength by 3-6 lbs  Decrease pain by 1-2 levels on 1-10 pain scale  Increase UE PROM by 10-15 Degrees in all planes  LTG 6-8 weeks:   Increase UE strength 10-20 lbs  Demonstrate UE AROM WFL in all planes  Decrease pain to <2/10 with activity  Patient independent with HEP        Plan  Planned modality interventions: ultrasound and unattended electrical stimulation  Planned therapy interventions: joint mobilization, manual therapy, patient education, self care, strengthening, stretching, therapeutic activities, therapeutic exercise, therapeutic training, flexibility and coordination  Frequency: 3x week  Duration in weeks: 6  Treatment plan discussed with: patient      Subjective Evaluation    Pain  Quality: knife-like, grinding, dull ache, needle-like, pressure, pulling, sharp, radiating, tight and discomfort  Relieving factors: relaxation, rest, support, medications and change in position  Aggravating factors: overhead activity, keyboarding and nothing    Treatments  Current treatment: physical therapy  Patient Goals  Patient goals for therapy: decreased pain, increased motion, return to work, return to [employer] activities, independence with ADLs/IADLs and increased strength        Objective    Flowsheet Rows      Most Recent Value   PT/OT G-Codes   Assessment Type  Re-evaluation   G code set  Carrying, Moving & Handling Objects   Carrying, Moving and Handling Objects Current Status ()  CI   Carrying, Moving and Handling Objects Goal Status ()  CH        Precautions: Left Shoulder pain and limitations  Date of onset:  4/11/18     Date of Surgery:  None    History of Present Episode: 7/11/2018  Binh Gay states that over the past few months her left shoulder has flared up  She is really flared up this past week  Past Medical History:    7/11/2018  Binh Gay reports no past medical history  Previous Level of Functional Ability:  7/11/2018  Yannilaila Gay states her left shoulder was fine before these current issues with no limitations  Inspection / Palpation:  7/11/2018  Mesomorphic body type  No signs of infection  No signs of wounds  No signs of drainage  No signs of ecchymotic regions  No signs of erythremic regions  Moderate signs of muscle spasm  Moderate signs of muscle guarding  Moderate signs of tenderness reported to palpation  No signs of swelling  No signs of a surgery site  Current conditions appear consistent with recent episode  Chief Complaints:  7/11/2018  Yannilaila Gay reports moderate difficulty with bending her left shoulder  Binh Gay reports moderate difficulty with movement of her left shoulder  Binh Gay reports mild to moderate difficulty with use of her left arm  Binh Gay reports moderate difficulty with lifting / elevating her left arm  Binh Gay reports mild to moderate difficulty with sleeping  Binh Gay reports moderate difficulty with her strength and endurance  Holly Hutchins reports moderate limitations with her left shoulder range of motion  Holly Hutchins reports moderate to severe difficulty lying on her left shoulder region      SHOULDER PAIN Resting Palpation Moving Lifting Elevating   7/11/2018 Rt 0 0 0 0 0   7/11/2018 Lt  0-1 0-2 0-5 0-7 2-7   9/5/18 Lt 0-1 0-1 0-2 0-3 1-4   10/04/2018 Lt 0-1 0-1 0-1 0-2 0-3   11/1/2018 Lt 0-1 0-1 0-1 0-1 0-2     SHOULDER PAIN Sleeping Throwing Pushing Pulling Twisting   7/11/2018 Rt 0 0 0 0 0   7/11/2018 Lt  0-2 NA 0-2 0-2 0-2   9/5/18 Lt 0-1 3-7 0-1 0-1 0-1   10/4/2018 Lt 0-1 2-5 0-1 0-1 0-1   11/1/2018 Lt 0-1 1-4 0-1 0-1 0-1     SHOULDER AROM Flexion Extension Abduction   7/11/2018 Rt 180° 65° 180°   7/11/2018 Lt 135° 40° 135°   9/5/18 Lt 164° 56° 164°   10/4/2018 Lt 176° 64° 176°   11/1/2018 Lt 179° 68° 179°     SHOULDER AROM Hor Add Ext Rotation Int Rotation   7/11/2018 Rt 75° 95° 95°   7/11/2018 Lt 45° 65° 52°   9/5/18 Lt 62° 82° 92°   10/4/2018 Lt 68° 90° 65°   11/1/2018 Lt 71° 92° 96°     SHLD MMT Flexion Extension Abduction   7/11/2018 Rt 0/10  28 lbs 0/10  27 lbs 0/10  29 lbs   7/11/2018 Lt 1/10  14 lbs 1/10  15 lbs 3/10  8 lbs   9/5/18 Lt 1/10  19 lbs 1/10  22 lbs 2/10  17 lbs   10/4/2018 Lt 1/10 22 lbs 1/10  26 lbs 1/10  20 lbs   11/1/2018 Lt 1/10  25 lbs 1/10  30 lbs 1/10  23 lbs     SHLD MMT Hor Add Ext Rotation Int Rotation   7/11/2018 Rt 0/10  29 lbs 0/10  28 lbs 0/10  29 lbs   7/11/2018 Lt 0/10  12 lbs 3/10  9 lbs 0/10  10 lbs   9/5/18 Lt 0/10  20 lbs 2/10  19 lbs 0/10  19 lbs   10/4/2018 Lt 0/10  25 lbs 1/10  23 lbs 0/10  22 lbs   11/1/2018 Lt 0/10  28 lbs 1/10  25 lbs 0/10  26 lbs     Shoulder Screen Rotator Cuff Apprehension Anterior  Stability Posterior  Stability   Right Negative Negative Negative Negative   Left POSITIVE Negative Negative Negative     Shoulder Screen AC Joint SC Joint Drop Arm Adhesive Capsulitis   Right Negative Negative Negative Negative   Left Negative Negative POSITIVE POSITIVE

## 2018-11-01 NOTE — PROGRESS NOTES
Daily Note     Today's date: 2018  Patient name: Cong Lim  : 1970  MRN: [id number]  Referring provider: Avelino Sebastian MD  Dx:   Encounter Diagnosis     ICD-10-CM    1  Adhesive bursitis of left shoulder M75 02    2  Acute pain of left shoulder M25 512    3  Rotator cuff tendonitis, left M75 82      Subjective: No new c/o pain today  Objective: See treatment diary below    Assessment: Tolerated treatment well  Patient exhibited good technique with therapeutic exercises and would benefit from continued PT to decrease L shoulder pain and to increase L shoulder ROM/strength  Plan: Continue per plan of care  Progress treatment as tolerated        Manual  10/4 10/10 10/26 11/1    MT, ROM 20' 15' 20' 25'    Supine ROM 10' 15' 10' 5'            Exercise Diary  10/4 10/10 10/26 11/1    UBC  10'      FWC-Codman's 5# 30x 5#-30x      FWC-Curls,Tri 5# 30x 5#-30x      Finger Ladder 3x ea 3x      Alec-BP,PD,Lats,Row  30#-30x      W/P-PNF,IR,ER,PU,PS,Throw-Top,Mid,Bot  10#-30x 10# 30x     WP-OH,IR 2x5' 2x5' 2x5' 2x5'    Luna Dasen        ME, PE 15' 15' 15' 15'            Modalities 10/4 10/10 10/26 11/1    MH&ES NT 20' 15' 15'    US NT 15' 10' 10'

## 2018-11-01 NOTE — LETTER
2018    Nedra Guerrero MD  [facility] [address] [address]    Patient: Ling Chambers   YOB: 1970   Date of Visit: 2018     Encounter Diagnosis     ICD-10-CM    1  Adhesive bursitis of left shoulder M75 02    2  Acute pain of left shoulder M25 512    3  Rotator cuff tendonitis, left M75 82        Dear Dr Chapo Adame:    Please review the attached Plan of Care from [facility] recent visit  Please verify that you agree therapy should continue by signing the attached document and sending it back to our office  If you have any questions or concerns, please don't hesitate to call  Sincerely,    Terry Gandhi PT      Referring Provider:      I certify that I have read the below Plan of Care and certify the need for these services furnished under this plan of treatment while under my care  Nedra Guerrero MD  [facility] [address]: [phone]          Daily Note     Today's date: 2018  Patient name: Ling Chambers  : 1970  MRN: [id number]  Referring provider: Lona Claros MD  Dx:   Encounter Diagnosis     ICD-10-CM    1  Adhesive bursitis of left shoulder M75 02    2  Acute pain of left shoulder M25 512    3  Rotator cuff tendonitis, left M75 82      Subjective: No new c/o pain today  Objective: See treatment diary below    Assessment: Tolerated treatment well  Patient exhibited good technique with therapeutic exercises and would benefit from continued PT to decrease L shoulder pain and to increase L shoulder ROM/strength  Plan: Continue per plan of care  Progress treatment as tolerated        Manual  10/4 10/10 10/26 11/1    MT, ROM 20' 15' 20' 25'    Supine ROM 10' 15' 10' 5'            Exercise Diary  10/4 10/10 10/26 11/1    UBC  10'      FWC-Codman's 5# 30x 5#-30x      FWC-Curls,Tri 5# 30x 5#-30x      Finger Ladder 3x ea 3x      Alec-BP,PD,Lats,Row  30#-30x W/P-PNF,IR,ER,PU,PS,Throw-Top,Mid,Bot  10#-30x 10# 30x     WP-OH,IR 2x5' 2x5' 2x5' 2x5'    Charley Canal        ME, PE 15' 15' 15' 15'            Modalities 10/4 10/10 10/26 11/1    MH&ES NT 20' 15' 15'    US NT 15' 10' 10'                      PT Re-Evaluation     Today's date: 2018  Patient name: Santino Alford  : 1970  MRN: [id number]  Referring provider: Dora Figueroa MD  Dx:   Encounter Diagnosis     ICD-10-CM    1  Adhesive bursitis of left shoulder M75 02    2  Acute pain of left shoulder M25 512    3  Rotator cuff tendonitis, left M75 82      Start Time: 0750  Stop Time: 0900  Total time in clinic (min): 70 minutes    Assessment  Impairments: abnormal or restricted ROM, abnormal movement, impaired physical strength and pain with function    Assessment details: Slowly feeling better and able to perform more activities before her shoulder flares up  Understanding of Dx/Px/POC: excellent  Goals  STG 2-4 weeks:   Increase UE strength by 3-6 lbs  Decrease pain by 1-2 levels on 1-10 pain scale  Increase UE PROM by 10-15 Degrees in all planes  LTG 6-8 weeks:   Increase UE strength 10-20 lbs  Demonstrate UE AROM WFL in all planes  Decrease pain to <2/10 with activity  Patient independent with HEP        Plan  Planned modality interventions: ultrasound and unattended electrical stimulation  Planned therapy interventions: joint mobilization, manual therapy, patient education, self care, strengthening, stretching, therapeutic activities, therapeutic exercise, therapeutic training, flexibility and coordination  Frequency: 3x week  Duration in weeks: 6  Treatment plan discussed with: patient      Subjective Evaluation    Pain  Quality: knife-like, grinding, dull ache, needle-like, pressure, pulling, sharp, radiating, tight and discomfort  Relieving factors: relaxation, rest, support, medications and change in position  Aggravating factors: overhead activity, keyboarding and nothing    Treatments  Current treatment: physical therapy  Patient Goals  Patient goals for therapy: decreased pain, increased motion, return to work, return to [employer] activities, independence with ADLs/IADLs and increased strength        Objective    Flowsheet Rows      Most Recent Value   PT/OT G-Codes   Assessment Type  Re-evaluation   G code set  Carrying, Moving & Handling Objects   Carrying, Moving and Handling Objects Current Status ()  CI   Carrying, Moving and Handling Objects Goal Status ()  CH        Precautions: Left Shoulder pain and limitations  Date of onset:  4/11/18     Date of Surgery:  None    History of Present Episode: 7/11/2018  Machado Ports states that over the past few months her left shoulder has flared up  She is really flared up this past week  Past Medical History:    7/11/2018  Machado Ports reports no past medical history  Previous Level of Functional Ability:  7/11/2018  Machado Ports states her left shoulder was fine before these current issues with no limitations  Inspection / Palpation:  7/11/2018  Mesomorphic body type  No signs of infection  No signs of wounds  No signs of drainage  No signs of ecchymotic regions  No signs of erythremic regions  Moderate signs of muscle spasm  Moderate signs of muscle guarding  Moderate signs of tenderness reported to palpation  No signs of swelling  No signs of a surgery site  Current conditions appear consistent with recent episode  Chief Complaints:  7/11/2018  Machado Ports reports moderate difficulty with bending her left shoulder  Machado Ports reports moderate difficulty with movement of her left shoulder  Machado Ports reports mild to moderate difficulty with use of her left arm  Machado Ports reports moderate difficulty with lifting / elevating her left arm  Machado Ports reports mild to moderate difficulty with sleeping  Machado Ports reports moderate difficulty with her strength and endurance    Machado Ports reports moderate limitations with her left shoulder range of motion  Scouttiago Galarza reports moderate to severe difficulty lying on her left shoulder region      SHOULDER PAIN Resting Palpation Moving Lifting Elevating   7/11/2018 Rt 0 0 0 0 0   7/11/2018 Lt  0-1 0-2 0-5 0-7 2-7   9/5/18 Lt 0-1 0-1 0-2 0-3 1-4   10/04/2018 Lt 0-1 0-1 0-1 0-2 0-3   11/1/2018 Lt 0-1 0-1 0-1 0-1 0-2     SHOULDER PAIN Sleeping Throwing Pushing Pulling Twisting   7/11/2018 Rt 0 0 0 0 0   7/11/2018 Lt  0-2 NA 0-2 0-2 0-2   9/5/18 Lt 0-1 3-7 0-1 0-1 0-1   10/4/2018 Lt 0-1 2-5 0-1 0-1 0-1   11/1/2018 Lt 0-1 1-4 0-1 0-1 0-1     SHOULDER AROM Flexion Extension Abduction   7/11/2018 Rt 180° 65° 180°   7/11/2018 Lt 135° 40° 135°   9/5/18 Lt 164° 56° 164°   10/4/2018 Lt 176° 64° 176°   11/1/2018 Lt 179° 68° 179°     SHOULDER AROM Hor Add Ext Rotation Int Rotation   7/11/2018 Rt 75° 95° 95°   7/11/2018 Lt 45° 65° 52°   9/5/18 Lt 62° 82° 92°   10/4/2018 Lt 68° 90° 65°   11/1/2018 Lt 71° 92° 96°     SHLD MMT Flexion Extension Abduction   7/11/2018 Rt 0/10  28 lbs 0/10  27 lbs 0/10  29 lbs   7/11/2018 Lt 1/10  14 lbs 1/10  15 lbs 3/10  8 lbs   9/5/18 Lt 1/10  19 lbs 1/10  22 lbs 2/10  17 lbs   10/4/2018 Lt 1/10 22 lbs 1/10  26 lbs 1/10  20 lbs   11/1/2018 Lt 1/10  25 lbs 1/10  30 lbs 1/10  23 lbs     SHLD MMT Hor Add Ext Rotation Int Rotation   7/11/2018 Rt 0/10  29 lbs 0/10  28 lbs 0/10  29 lbs   7/11/2018 Lt 0/10  12 lbs 3/10  9 lbs 0/10  10 lbs   9/5/18 Lt 0/10  20 lbs 2/10  19 lbs 0/10  19 lbs   10/4/2018 Lt 0/10  25 lbs 1/10  23 lbs 0/10  22 lbs   11/1/2018 Lt 0/10  28 lbs 1/10  25 lbs 0/10  26 lbs     Shoulder Screen Rotator Cuff Apprehension Anterior  Stability Posterior  Stability   Right Negative Negative Negative Negative   Left POSITIVE Negative Negative Negative     Shoulder Screen AC Joint SC Joint Drop Arm Adhesive Capsulitis   Right Negative Negative Negative Negative   Left Negative Negative POSITIVE POSITIVE

## 2018-11-01 NOTE — LETTER
2018     Alexandro Nicholson MD  Formerly [facility], [id number] [address]    Patient: Susi Jensen   YOB: 1970   Date of Visit: 2018     Encounter Diagnosis     ICD-10-CM    1  Adhesive bursitis of left shoulder M75 02    2  Acute pain of left shoulder M25 512    3  Rotator cuff tendonitis, left M75 82        Dear Dr Ruthy Prado:    Please review the attached Plan of Care from [facility] recent visit  Please verify that you agree therapy should continue by signing the attached document and sending it back to our office  If you have any questions or concerns, please don't hesitate to call  Sincerely,    Kristyn Soriano PT      Referring Provider:      I certify that I have read the below Plan of Care and certify the need for these services furnished under this plan of treatment while under my care  Alexandro Nicholson MD  Formerly [facility], [id number] [address]: [phone]          Daily Note     Today's date: 2018  Patient name: Susi Jensen  : 1970  MRN: [id number]  Referring provider: Laney Stephens MD  Dx:   Encounter Diagnosis     ICD-10-CM    1  Adhesive bursitis of left shoulder M75 02    2  Acute pain of left shoulder M25 512    3  Rotator cuff tendonitis, left M75 82      Subjective: No new c/o pain today  Objective: See treatment diary below    Assessment: Tolerated treatment well  Patient exhibited good technique with therapeutic exercises and would benefit from continued PT to decrease L shoulder pain and to increase L shoulder ROM/strength  Plan: Continue per plan of care  Progress treatment as tolerated        Manual  10/4 10/10 10/26 11/1    MT, ROM 20' 15' 20' 25'    Supine ROM 10' 15' 10' 5'            Exercise Diary  10/4 10/10 10/26 11/1    [facility]  10'      FWC-Codman's 5# 30x 5#-30x      FWC-Curls,Tri 5# 30x 5#-30x      Finger Ladder 3x ea 3x      Alec-BP,PD,Lats,Row  30#-30x W/P-PNF,IR,ER,PU,PS,Throw-Top,Mid,Bot  10#-30x 10# 30x     WP-OH,IR 2x5' 2x5' 2x5' 2x5'    Luna Dasen        ME, PE 15' 15' 15' 15'            Modalities 10/4 10/10 10/26 11/1    MH&ES NT 20' 15' 15'    US NT 15' 10' 10'                      PT Re-Evaluation     Today's date: 2018  Patient name: Cong Lim  : 1970  MRN: [id number]  Referring provider: Avelino Sebastian MD  Dx:   Encounter Diagnosis     ICD-10-CM    1  Adhesive bursitis of left shoulder M75 02    2  Acute pain of left shoulder M25 512    3  Rotator cuff tendonitis, left M75 82      Start Time: 0750  Stop Time: 0900  Total time in clinic (min): 70 minutes    Assessment  Impairments: abnormal or restricted ROM, abnormal movement, impaired physical strength and pain with function    Assessment details: Slowly feeling better and able to perform more activities before her shoulder flares up  Understanding of Dx/Px/POC: excellent  Goals  STG 2-4 weeks:   Increase UE strength by 3-6 lbs  Decrease pain by 1-2 levels on 1-10 pain scale  Increase UE PROM by 10-15 Degrees in all planes  LTG 6-8 weeks:   Increase UE strength 10-20 lbs  Demonstrate UE AROM WFL in all planes  Decrease pain to <2/10 with activity  Patient independent with HEP        Plan  Planned modality interventions: ultrasound and unattended electrical stimulation  Planned therapy interventions: joint mobilization, manual therapy, patient education, self care, strengthening, stretching, therapeutic activities, therapeutic exercise, therapeutic training, flexibility and coordination  Frequency: 3x week  Duration in weeks: 6  Treatment plan discussed with: patient      Subjective Evaluation    Pain  Quality: knife-like, grinding, dull ache, needle-like, pressure, pulling, sharp, radiating, tight and discomfort  Relieving factors: relaxation, rest, support, medications and change in position  Aggravating factors: overhead activity, keyboarding and nothing    Treatments  Current treatment: physical therapy  Patient Goals  Patient goals for therapy: decreased pain, increased motion, return to work, return to [employer] activities, independence with ADLs/IADLs and increased strength        Objective    Flowsheet Rows      Most Recent Value   PT/OT G-Codes   Assessment Type  Re-evaluation   G code set  Carrying, Moving & Handling Objects   Carrying, Moving and Handling Objects Current Status ()  CI   Carrying, Moving and Handling Objects Goal Status ()  CH        Precautions: Left Shoulder pain and limitations  Date of onset:  4/11/18     Date of Surgery:  None    History of Present Episode: 7/11/2018  Nika Haile states that over the past few months her left shoulder has flared up  She is really flared up this past week  Past Medical History:    7/11/2018  Nika Haile reports no past medical history  Previous Level of Functional Ability:  7/11/2018  Nika Haile states her left shoulder was fine before these current issues with no limitations  Inspection / Palpation:  7/11/2018  Mesomorphic body type  No signs of infection  No signs of wounds  No signs of drainage  No signs of ecchymotic regions  No signs of erythremic regions  Moderate signs of muscle spasm  Moderate signs of muscle guarding  Moderate signs of tenderness reported to palpation  No signs of swelling  No signs of a surgery site  Current conditions appear consistent with recent episode  Chief Complaints:  7/11/2018  Nika Haile reports moderate difficulty with bending her left shoulder  Gearl Mic reports moderate difficulty with movement of her left shoulder  Nika Haile reports mild to moderate difficulty with use of her left arm  Vikraml Mic reports moderate difficulty with lifting / elevating her left arm  Vikraml Mic reports mild to moderate difficulty with sleeping  Nika Haile reports moderate difficulty with her strength and endurance    Nika Haile reports moderate limitations with her left shoulder range of motion  Holly Hutchins reports moderate to severe difficulty lying on her left shoulder region      SHOULDER PAIN Resting Palpation Moving Lifting Elevating   7/11/2018 Rt 0 0 0 0 0   7/11/2018 Lt  0-1 0-2 0-5 0-7 2-7   9/5/18 Lt 0-1 0-1 0-2 0-3 1-4   10/04/2018 Lt 0-1 0-1 0-1 0-2 0-3   11/1/2018 Lt 0-1 0-1 0-1 0-1 0-2     SHOULDER PAIN Sleeping Throwing Pushing Pulling Twisting   7/11/2018 Rt 0 0 0 0 0   7/11/2018 Lt  0-2 NA 0-2 0-2 0-2   9/5/18 Lt 0-1 3-7 0-1 0-1 0-1   10/4/2018 Lt 0-1 2-5 0-1 0-1 0-1   11/1/2018 Lt 0-1 1-4 0-1 0-1 0-1     SHOULDER AROM Flexion Extension Abduction   7/11/2018 Rt 180° 65° 180°   7/11/2018 Lt 135° 40° 135°   9/5/18 Lt 164° 56° 164°   10/4/2018 Lt 176° 64° 176°   11/1/2018 Lt 179° 68° 179°     SHOULDER AROM Hor Add Ext Rotation Int Rotation   7/11/2018 Rt 75° 95° 95°   7/11/2018 Lt 45° 65° 52°   9/5/18 Lt 62° 82° 92°   10/4/2018 Lt 68° 90° 65°   11/1/2018 Lt 71° 92° 96°     SHLD MMT Flexion Extension Abduction   7/11/2018 Rt 0/10  28 lbs 0/10  27 lbs 0/10  29 lbs   7/11/2018 Lt 1/10  14 lbs 1/10  15 lbs 3/10  8 lbs   9/5/18 Lt 1/10  19 lbs 1/10  22 lbs 2/10  17 lbs   10/4/2018 Lt 1/10 22 lbs 1/10  26 lbs 1/10  20 lbs   11/1/2018 Lt 1/10  25 lbs 1/10  30 lbs 1/10  23 lbs     SHLD MMT Hor Add Ext Rotation Int Rotation   7/11/2018 Rt 0/10  29 lbs 0/10  28 lbs 0/10  29 lbs   7/11/2018 Lt 0/10  12 lbs 3/10  9 lbs 0/10  10 lbs   9/5/18 Lt 0/10  20 lbs 2/10  19 lbs 0/10  19 lbs   10/4/2018 Lt 0/10  25 lbs 1/10  23 lbs 0/10  22 lbs   11/1/2018 Lt 0/10  28 lbs 1/10  25 lbs 0/10  26 lbs     Shoulder Screen Rotator Cuff Apprehension Anterior  Stability Posterior  Stability   Right Negative Negative Negative Negative   Left POSITIVE Negative Negative Negative     Shoulder Screen AC Joint SC Joint Drop Arm Adhesive Capsulitis   Right Negative Negative Negative Negative   Left Negative Negative POSITIVE POSITIVE

## 2018-11-02 NOTE — PROGRESS NOTES
PT Re-Evaluation     Today's date: 2018  Patient name: Terri Hardy  : 1970  MRN: [id number]  Referring provider: Samir Small MD  Dx:   Encounter Diagnosis     ICD-10-CM    1  Adhesive bursitis of left shoulder M75 02    2  Acute pain of left shoulder M25 512    3  Rotator cuff tendonitis, left M75 82      Start Time: 0750  Stop Time: 0900  Total time in clinic (min): 70 minutes    Assessment  Impairments: abnormal or restricted ROM, abnormal movement, impaired physical strength and pain with function    Assessment details: Slowly feeling better and able to perform more activities before her shoulder flares up  Understanding of Dx/Px/POC: excellent  Goals  STG 2-4 weeks:   Increase UE strength by 3-6 lbs  Decrease pain by 1-2 levels on 1-10 pain scale  Increase UE PROM by 10-15 Degrees in all planes  LTG 6-8 weeks:   Increase UE strength 10-20 lbs  Demonstrate UE AROM WFL in all planes  Decrease pain to <2/10 with activity  Patient independent with HEP        Plan  Planned modality interventions: ultrasound and unattended electrical stimulation  Planned therapy interventions: joint mobilization, manual therapy, patient education, self care, strengthening, stretching, therapeutic activities, therapeutic exercise, therapeutic training, flexibility and coordination  Frequency: 3x week  Duration in weeks: 6  Treatment plan discussed with: patient      Subjective Evaluation    Pain  Quality: knife-like, grinding, dull ache, needle-like, pressure, pulling, sharp, radiating, tight and discomfort  Relieving factors: relaxation, rest, support, medications and change in position  Aggravating factors: overhead activity, keyboarding and nothing    Treatments  Current treatment: physical therapy  Patient Goals  Patient goals for therapy: decreased pain, increased motion, return to work, return to [employer] activities, independence with ADLs/IADLs and increased strength        Objective    Flowsheet Rows      Most Recent Value   PT/OT G-Codes   Assessment Type  Re-evaluation   G code set  Carrying, Moving & Handling Objects   Carrying, Moving and Handling Objects Current Status ()  CI   Carrying, Moving and Handling Objects Goal Status ()  CH        Precautions: Left Shoulder pain and limitations  Date of onset:  4/11/18     Date of Surgery:  None    History of Present Episode: 7/11/2018  Anthony Levine states that over the past few months her left shoulder has flared up  She is really flared up this past week  Past Medical History:    7/11/2018  Anthony Levine reports no past medical history  Previous Level of Functional Ability:  7/11/2018  Anthony Levine states her left shoulder was fine before these current issues with no limitations  Inspection / Palpation:  7/11/2018  Mesomorphic body type  No signs of infection  No signs of wounds  No signs of drainage  No signs of ecchymotic regions  No signs of erythremic regions  Moderate signs of muscle spasm  Moderate signs of muscle guarding  Moderate signs of tenderness reported to palpation  No signs of swelling  No signs of a surgery site  Current conditions appear consistent with recent episode  Chief Complaints:  7/11/2018  Anthony Levine reports moderate difficulty with bending her left shoulder  Anthony Levine reports moderate difficulty with movement of her left shoulder  Anthonybabs Levine reports mild to moderate difficulty with use of her left arm  Anthonybabs Levine reports moderate difficulty with lifting / elevating her left arm  Anthony Levine reports mild to moderate difficulty with sleeping  Anthonybabs Levine reports moderate difficulty with her strength and endurance  Anthony Levine reports moderate limitations with her left shoulder range of motion  Anthony Levine reports moderate to severe difficulty lying on her left shoulder region      SHOULDER PAIN Resting Palpation Moving Lifting Elevating   7/11/2018 Rt 0 0 0 0 0   7/11/2018 Lt  0-1 0-2 0-5 0-7 2-7   9/5/18 Lt 0-1 0-1 0-2 0-3 1-4   10/04/2018 Lt 0-1 0-1 0-1 0-2 0-3   11/1/2018 Lt 0-1 0-1 0-1 0-1 0-2     SHOULDER PAIN Sleeping Throwing Pushing Pulling Twisting   7/11/2018 Rt 0 0 0 0 0   7/11/2018 Lt  0-2 NA 0-2 0-2 0-2   9/5/18 Lt 0-1 3-7 0-1 0-1 0-1   10/4/2018 Lt 0-1 2-5 0-1 0-1 0-1   11/1/2018 Lt 0-1 1-4 0-1 0-1 0-1     SHOULDER AROM Flexion Extension Abduction   7/11/2018 Rt 180° 65° 180°   7/11/2018 Lt 135° 40° 135°   9/5/18 Lt 164° 56° 164°   10/4/2018 Lt 176° 64° 176°   11/1/2018 Lt 179° 68° 179°     SHOULDER AROM Hor Add Ext Rotation Int Rotation   7/11/2018 Rt 75° 95° 95°   7/11/2018 Lt 45° 65° 52°   9/5/18 Lt 62° 82° 92°   10/4/2018 Lt 68° 90° 65°   11/1/2018 Lt 71° 92° 96°     SHLD MMT Flexion Extension Abduction   7/11/2018 Rt 0/10  28 lbs 0/10  27 lbs 0/10  29 lbs   7/11/2018 Lt 1/10  14 lbs 1/10  15 lbs 3/10  8 lbs   9/5/18 Lt 1/10  19 lbs 1/10  22 lbs 2/10  17 lbs   10/4/2018 Lt 1/10 22 lbs 1/10  26 lbs 1/10  20 lbs   11/1/2018 Lt 1/10  25 lbs 1/10  30 lbs 1/10  23 lbs     SHLD MMT Hor Add Ext Rotation Int Rotation   7/11/2018 Rt 0/10  29 lbs 0/10  28 lbs 0/10  29 lbs   7/11/2018 Lt 0/10  12 lbs 3/10  9 lbs 0/10  10 lbs   9/5/18 Lt 0/10  20 lbs 2/10  19 lbs 0/10  19 lbs   10/4/2018 Lt 0/10  25 lbs 1/10  23 lbs 0/10  22 lbs   11/1/2018 Lt 0/10  28 lbs 1/10  25 lbs 0/10  26 lbs     Shoulder Screen Rotator Cuff Apprehension Anterior  Stability Posterior  Stability   Right Negative Negative Negative Negative   Left POSITIVE Negative Negative Negative     Shoulder Screen AC Joint SC Joint Drop Arm Adhesive Capsulitis   Right Negative Negative Negative Negative   Left Negative Negative POSITIVE POSITIVE

## 2018-11-08 ENCOUNTER — APPOINTMENT (OUTPATIENT)
Dept: PHYSICAL THERAPY | Facility: CLINIC | Age: 48
End: 2018-11-08
Payer: COMMERCIAL

## 2018-11-09 ENCOUNTER — OFFICE VISIT (OUTPATIENT)
Dept: PHYSICAL THERAPY | Facility: CLINIC | Age: 48
End: 2018-11-09
Payer: COMMERCIAL

## 2018-11-09 DIAGNOSIS — M25.512 ACUTE PAIN OF LEFT SHOULDER: ICD-10-CM

## 2018-11-09 DIAGNOSIS — M75.82 ROTATOR CUFF TENDONITIS, LEFT: ICD-10-CM

## 2018-11-09 DIAGNOSIS — M75.02 ADHESIVE BURSITIS OF LEFT SHOULDER: Primary | ICD-10-CM

## 2018-11-09 PROCEDURE — 97014 ELECTRIC STIMULATION THERAPY: CPT

## 2018-11-09 PROCEDURE — 97140 MANUAL THERAPY 1/> REGIONS: CPT

## 2018-11-09 PROCEDURE — 97035 APP MDLTY 1+ULTRASOUND EA 15: CPT

## 2018-11-09 PROCEDURE — 97110 THERAPEUTIC EXERCISES: CPT

## 2018-11-09 NOTE — PROGRESS NOTES
Daily Note     Today's date: 2018  Patient name: Sean Ingram  : 1970  MRN: [id number]  Referring provider: Katy Kennedy MD  Dx:   Encounter Diagnosis     ICD-10-CM    1  Adhesive bursitis of left shoulder M75 02    2  Acute pain of left shoulder M25 512    3  Rotator cuff tendonitis, left M75 82      Subjective: "I hurt my L shoulder at the gym  It's sore today "  Patient reports pain scale for L shoulder at 4/10 today  Objective: See treatment diary below    Assessment: Tolerated treatment well  Patient exhibited good technique with therapeutic exercises and would benefit from continued PT to decrease L shoulder pain and to increase L shoulder strength/ROM  Patient reports pain scale for L shoulder post treatment at 1/10  Plan: Continue per plan of care  Progress treatment as tolerated        Manual  10/4 10/10 10/26 11/1 11/9   MT, ROM 20' 15' 20' 25' 20'   Supine ROM 10' 15' 10' 5' 10'           Exercise Diary  10/4 10/10 10/26 11/1 11/9   UBC  10'      FWC-Codman's 5# 30x 5#-30x      FWC-Curls,Tri 5# 30x 5#-30x      Finger Ladder 3x ea 3x   3x ea   Alec-BP,PD,Lats,Row  30#-30x      W/P-PNF,IR,ER,PU,PS,Throw-Top,Mid,Bot  10#-30x 10# 30x  10# 30x   WP-OH,IR 2x5' 2x5' 2x5' 2x5' 2x5'   [facility], PE 15' 15' 15' 15' 15'           Modalities 10/4 10/10 10/26 11/1 11/9   MH&ES NT 20' 15' 15' 15'   US NT 15' 10' 10' 10'

## 2018-11-15 ENCOUNTER — OFFICE VISIT (OUTPATIENT)
Dept: PHYSICAL THERAPY | Facility: CLINIC | Age: 48
End: 2018-11-15
Payer: COMMERCIAL

## 2018-11-15 DIAGNOSIS — M75.02 ADHESIVE BURSITIS OF LEFT SHOULDER: Primary | ICD-10-CM

## 2018-11-15 DIAGNOSIS — M25.512 ACUTE PAIN OF LEFT SHOULDER: ICD-10-CM

## 2018-11-15 DIAGNOSIS — M75.82 ROTATOR CUFF TENDONITIS, LEFT: ICD-10-CM

## 2018-11-15 PROCEDURE — 97140 MANUAL THERAPY 1/> REGIONS: CPT

## 2018-11-15 PROCEDURE — 97035 APP MDLTY 1+ULTRASOUND EA 15: CPT

## 2018-11-15 PROCEDURE — 97014 ELECTRIC STIMULATION THERAPY: CPT

## 2018-11-15 PROCEDURE — 97110 THERAPEUTIC EXERCISES: CPT

## 2018-11-15 NOTE — PROGRESS NOTES
Daily Note     Today's date: 11/15/2018  Patient name: Rhianna Palm  : 1970  MRN: [id number]  Referring provider: Abhishek Calderon MD  Dx:   Encounter Diagnosis     ICD-10-CM    1  Adhesive bursitis of left shoulder M75 02    2  Acute pain of left shoulder M25 512    3  Rotator cuff tendonitis, left M75 82      Subjective: No new c/o pain today  Objective: See treatment diary below    Assessment: Tolerated treatment well  Patient exhibited good technique with therapeutic exercises and would benefit from continued PT to decrease L shoulder pain and to increase L shoulder strength/ROM  Plan: Continue per plan of care  Progress treatment as tolerated        Manual  11/15    11/9   MT, ROM 20'    20'   Supine ROM 10'    10'           Exercise Diary  11/15    11/9   [facility] [place]        FWC-Codman's 5# 30x       FWC-Curls,Tri 5# 30x       Finger Ladder 3x ea    3x ea   Alec-BP,PD,Lats,Row        W/P-PNF,IR,ER,PU,PS,Throw-Top,Mid,Bot 10# 30x    10# 30x   WP-OH,IR 2x5'    2x5'   Wall Slides-IYV/Ball 3x10       ME, PE 15'    15'           Modalities 11/15    11/9   MH&ES 20'    15'   US 10'    10'

## 2018-11-23 ENCOUNTER — OFFICE VISIT (OUTPATIENT)
Dept: PHYSICAL THERAPY | Facility: CLINIC | Age: 48
End: 2018-11-23
Payer: COMMERCIAL

## 2018-11-23 DIAGNOSIS — M75.02 ADHESIVE BURSITIS OF LEFT SHOULDER: Primary | ICD-10-CM

## 2018-11-23 DIAGNOSIS — M25.512 ACUTE PAIN OF LEFT SHOULDER: ICD-10-CM

## 2018-11-23 DIAGNOSIS — M75.82 ROTATOR CUFF TENDONITIS, LEFT: ICD-10-CM

## 2018-11-23 PROCEDURE — 97014 ELECTRIC STIMULATION THERAPY: CPT

## 2018-11-23 PROCEDURE — 97110 THERAPEUTIC EXERCISES: CPT

## 2018-11-23 PROCEDURE — 97140 MANUAL THERAPY 1/> REGIONS: CPT

## 2018-11-23 PROCEDURE — 97035 APP MDLTY 1+ULTRASOUND EA 15: CPT

## 2018-11-23 NOTE — PROGRESS NOTES
Daily Note     Today's date: 2018  Patient name: Ben Rushing  : 1970  MRN: [id number]  Referring provider: Chandrika Lara MD  Dx:   Encounter Diagnosis     ICD-10-CM    1  Adhesive bursitis of left shoulder M75 02    2  Acute pain of left shoulder M25 512    3  Rotator cuff tendonitis, left M75 82      Subjective: "I've been going to the gym and my shoulder has been doing okay  I do have days though where my L shoulder feels like its all locked up  I was able to lift our 17lb turkey yesterday with no increase in symptoms "    Objective: See treatment diary below    Assessment: Tolerated treatment well  Patient exhibited good technique with therapeutic exercises and would benefit from continued PT to decrease L shoulder pain and to increase L shoulder ROM/strength  L shoulder continues to be limited in end ranges  Pain/discomfort also present in L shoulder at end ranges  Plan: Continue per plan of care  Progress treatment as tolerated        Manual  11/15 11/23      MT, ROM 20' 20'      Supine ROM 10' 10'              Exercise Diary  11/15 11/23      [facility]        FWC-Codman's 5# 30x 5# 30x      FWC-Curls,Tri 5# 30x 5# 30x      Finger Ladder 3x ea 3x ea      Alec-BP,PD,Lats,Row        W/P-PNF,IR,ER,PU,PS,Throw-Top,Mid,Bot 10# 30x 10# 30x      WP-OH,IR 2x5' 2x5'      Wall Slides-IYV/BallStabExs 3x10 3x10      ME, PE 15' 15'              Modalities 11/15 11/23      MH&ES 20' 20'      US 10' 10'

## 2018-11-29 ENCOUNTER — OFFICE VISIT (OUTPATIENT)
Dept: PHYSICAL THERAPY | Facility: CLINIC | Age: 48
End: 2018-11-29
Payer: COMMERCIAL

## 2018-11-29 DIAGNOSIS — M25.512 ACUTE PAIN OF LEFT SHOULDER: ICD-10-CM

## 2018-11-29 DIAGNOSIS — M75.02 ADHESIVE BURSITIS OF LEFT SHOULDER: Primary | ICD-10-CM

## 2018-11-29 DIAGNOSIS — M75.82 ROTATOR CUFF TENDONITIS, LEFT: ICD-10-CM

## 2018-11-29 PROCEDURE — 97035 APP MDLTY 1+ULTRASOUND EA 15: CPT

## 2018-11-29 PROCEDURE — 97140 MANUAL THERAPY 1/> REGIONS: CPT

## 2018-11-29 PROCEDURE — 97110 THERAPEUTIC EXERCISES: CPT

## 2018-12-03 ENCOUNTER — APPOINTMENT (OUTPATIENT)
Dept: PHYSICAL THERAPY | Facility: CLINIC | Age: 48
End: 2018-12-03
Payer: COMMERCIAL

## 2018-12-06 ENCOUNTER — OFFICE VISIT (OUTPATIENT)
Dept: PHYSICAL THERAPY | Facility: CLINIC | Age: 48
End: 2018-12-06
Payer: COMMERCIAL

## 2018-12-06 DIAGNOSIS — M25.512 ACUTE PAIN OF LEFT SHOULDER: ICD-10-CM

## 2018-12-06 DIAGNOSIS — M75.82 ROTATOR CUFF TENDONITIS, LEFT: ICD-10-CM

## 2018-12-06 DIAGNOSIS — M75.02 ADHESIVE BURSITIS OF LEFT SHOULDER: Primary | ICD-10-CM

## 2018-12-06 PROCEDURE — 97140 MANUAL THERAPY 1/> REGIONS: CPT

## 2018-12-06 PROCEDURE — G8984 CARRY CURRENT STATUS: HCPCS | Performed by: PHYSICAL THERAPIST

## 2018-12-06 PROCEDURE — G8985 CARRY GOAL STATUS: HCPCS | Performed by: PHYSICAL THERAPIST

## 2018-12-06 PROCEDURE — 97164 PT RE-EVAL EST PLAN CARE: CPT | Performed by: PHYSICAL THERAPIST

## 2018-12-06 PROCEDURE — 97110 THERAPEUTIC EXERCISES: CPT

## 2018-12-06 PROCEDURE — 97014 ELECTRIC STIMULATION THERAPY: CPT

## 2018-12-06 NOTE — PROGRESS NOTES
Daily Note     Today's date: 2018  Patient name: Jagjit Ulrich  : 1970  MRN: [id number]  Referring provider: aPblo Silverio MD  Dx:   Encounter Diagnosis     ICD-10-CM    1  Adhesive bursitis of left shoulder M75 02    2  Acute pain of left shoulder M25 512    3  Rotator cuff tendonitis, left M75 82      Subjective: "I felt so much better after the last therapy treatment  I was pain free for 3 days afterwards!"     Objective: See treatment diary below    Assessment: Tolerated treatment well  Patient exhibited good technique with therapeutic exercises and would benefit from continued PT to decrease L shoulder pain and to increase L shoulder ROM/strength  Plan: Continue per plan of care  Progress treatment as tolerated        Manual  11/15 11/23 11/29 12/6    MT, ROM 20' 20' 20' 20'    Supine ROM 10' 10' 5'             Exercise Diary  11/15 11/23 11/29 12/6    [facility]        FWC-Codman's 5# 30x 5# 30x 5# 30x     FWC-Curls,Tri 5# 30x 5# 30x 5# 30x     Finger Ladder 3x ea 3x ea 3x ea 3x ea    Alec-BP,PD,Lats,Row   25# 30x 25# 30x    W/P-PNF,IR,ER,PU,PS,Throw-Top,Mid,Bot 10# 30x 10# 30x 10# 30x 10# 30x    WP-OH,IR 2x5' 2x5' 2x5' 2x5'    Wall Slides-IYV/BallStabExs 3x10 3x10  3x10    Wall-OH Ball     10x    ME, PE 15' 15' 15' 15'            Modalities 11/15 11/23 11/29 12/6    MH&ES 20' 20' NT 10'    US 10' 10' 10' NT

## 2018-12-06 NOTE — LETTER
2018    Grace Saha MD  [address] [address]    Patient: Cong Lim   YOB: 1970   Date of Visit: 2018     Encounter Diagnosis     ICD-10-CM    1  Adhesive bursitis of left shoulder M75 02    2  Acute pain of left shoulder M25 512    3  Rotator cuff tendonitis, left M75 82        Dear Dr Florina Moreno:    Please review the attached Plan of Care from [facility] recent visit  Please verify that you agree therapy should continue by signing the attached document and sending it back to our office  If you have any questions or concerns, please don't hesitate to call  Sincerely,    Theo Postal, PT      Referring Provider:      I certify that I have read the below Plan of Care and certify the need for these services furnished under this plan of treatment while under my care  Grace Saha MD  [address]: [phone]          Daily Note     Today's date: 2018  Patient name: Cong Lim  : 1970  MRN: [id number]  Referring provider: Avelino Sebastian MD  Dx:   Encounter Diagnosis     ICD-10-CM    1  Adhesive bursitis of left shoulder M75 02    2  Acute pain of left shoulder M25 512    3  Rotator cuff tendonitis, left M75 82      Subjective: "I felt so much better after the last therapy treatment  I was pain free for 3 days afterwards!"     Objective: See treatment diary below    Assessment: Tolerated treatment well  Patient exhibited good technique with therapeutic exercises and would benefit from continued PT to decrease L shoulder pain and to increase L shoulder ROM/strength  Plan: Continue per plan of care  Progress treatment as tolerated        Manual  11/15 11/23 11/29 12/6    MT, ROM 20' 20' 20' 20'    Supine ROM 10' 10' 5'             Exercise Diary  11/15 11/23 11/29 12/6    [facility] [place]        FW-Bulmaro's 5# 30x 5# 30x 5# 30x     FWTAYLA-Curls,Tri 5# 30x 5# 30x 5# 30x Finger Ladder 3x ea 3x ea 3x ea 3x ea    Alec-BP,PD,Lats,Row   25# 30x 25# 30x    W/P-PNF,IR,ER,PU,PS,Throw-Top,Mid,Bot 10# 30x 10# 30x 10# 30x 10# 30x    WP-OH,IR 2x5' 2x5' 2x5' 2x5'    Wall Slides-IYV/BallStabExs 3x10 3x10  3x10    Wall-OH Ball     10x    ME, PE 15' 15' 15' 15'            Modalities 11/15 11/23 11/29 12/6    MH&ES 20' 20' NT 10'    US 10' 10' 10' NT                    PT Re-Evaluation     Today's date: 2018  Patient name: Eliu Ballard  : 1970  MRN: [id number]  Referring provider: Luis Vargas MD  Dx:   Encounter Diagnosis     ICD-10-CM    1  Adhesive bursitis of left shoulder M75 02    2  Acute pain of left shoulder M25 512    3  Rotator cuff tendonitis, left M75 82      Start Time: 745  Stop Time: 9444  Total time in clinic (min): 65 minutes    Assessment  Assessment details: Patient is slowly improving with use of her left arm / shoulder region  Overhead activities are still limited and painful  Impairments: abnormal or restricted ROM, abnormal movement, activity intolerance, impaired physical strength, pain with function and safety issue  Understanding of Dx/Px/POC: excellent  Goals  STG 2-4 weeks:   Increase UE strength by 3-6 lbs  Decrease pain by 1-2 levels on 1-10 pain scale  Increase UE PROM by 10-15 Degrees in all planes  LTG 6-8 weeks:   Increase UE strength 10-20 lbs  Demonstrate UE AROM WFL in all planes  Decrease pain to <2/10 with activity  Patient independent with HEP        Plan  Planned modality interventions: ultrasound and unattended electrical stimulation  Planned therapy interventions: joint mobilization, manual therapy, patient education, postural training, self care, strengthening, stretching, therapeutic activities, therapeutic exercise, therapeutic training, flexibility and coordination  Frequency: 3x week  Duration in weeks: 6  Treatment plan discussed with: patient      Subjective Evaluation    Pain  Quality: discomfort, dull ache, sharp, radiating, pressure and tight  Relieving factors: medications, heat, change in position, relaxation and rest  Aggravating factors: overhead activity and lifting  Progression: improved    Treatments  Current treatment: physical therapy  Patient Goals  Patient goals for therapy: independence with ADLs/IADLs, increased strength, return to sport/leisure activities, return to work, increased motion and decreased pain        Objective    Flowsheet Rows      Most Recent Value   PT/OT G-Codes   Assessment Type  Re-evaluation   G code set  Carrying, Moving & Handling Objects   Carrying, Moving and Handling Objects Current Status ()  CI   Carrying, Moving and Handling Objects Goal Status ()  CH        Precautions: Left Shoulder pain and limitations  Date of onset:  4/11/18     Date of Surgery:  None    History of Present Episode: 7/11/2018  Anthony Levine states that over the past few months her left shoulder has flared up  She is really flared up this past week  Past Medical History:    7/11/2018  Anthony Levine reports no past medical history  Previous Level of Functional Ability:  7/11/2018  Anthony Levine states her left shoulder was fine before these current issues with no limitations  Inspection / Palpation:  7/11/2018  Mesomorphic body type  No signs of infection  No signs of wounds  No signs of drainage  No signs of ecchymotic regions  No signs of erythremic regions  Moderate signs of muscle spasm  Moderate signs of muscle guarding  Moderate signs of tenderness reported to palpation  No signs of swelling  No signs of a surgery site  Current conditions appear consistent with recent episode  Chief Complaints:  7/11/2018  Anthony Levine reports moderate difficulty with bending her left shoulder  Anthony Levine reports moderate difficulty with movement of her left shoulder  Anthony Levine reports mild to moderate difficulty with use of her left arm    Anthony Levine reports moderate difficulty with lifting / elevating her left arm  Esteban Amrita reports mild to moderate difficulty with sleeping  Esteban Amrita reports moderate difficulty with her strength and endurance  Esteban Amrita reports moderate limitations with her left shoulder range of motion  Esteban Amrita reports moderate to severe difficulty lying on her left shoulder region      SHOULDER PAIN Resting Palpation Moving Lifting Elevating   7/11/2018 Rt 0 0 0 0 0   7/11/2018 Lt  0-1 0-2 0-5 0-7 2-7   9/5/18 Lt 0-1 0-1 0-2 0-3 1-4   10/04/2018 Lt 0-1 0-1 0-1 0-2 0-3   11/1/2018 Lt 0-1 0-1 0-1 0-1 0-2   12/6/2018 Lt 0-1 0-1 0-1 0-1 0-1     SHOULDER PAIN Sleeping Throwing Pushing Pulling Twisting   7/11/2018 Rt 0 0 0 0 0   7/11/2018 Lt  0-2 NA 0-2 0-2 0-2   9/5/18 Lt 0-1 3-7 0-1 0-1 0-1   10/4/2018 Lt 0-1 2-5 0-1 0-1 0-1   11/1/2018 Lt 0-1 1-4 0-1 0-1 0-1   12/6/2018 Lt 0-1 1-3 0-1 0-1 0-1     SHOULDER AROM Flexion Extension Abduction   7/11/2018 Rt 180° 65° 180°   7/11/2018 Lt 135° 40° 135°   9/5/18 Lt 164° 56° 164°   10/4/2018 Lt 176° 64° 176°   11/1/2018 Lt 179° 68° 179°   12/6/2018 Lt 181° 70° 181°     SHOULDER AROM Hor Add Ext Rotation Int Rotation   7/11/2018 Rt 75° 95° 95°   7/11/2018 Lt 45° 65° 52°   9/5/18 Lt 62° 82° 92°   10/4/2018 Lt 68° 90° 65°   11/1/2018 Lt 71° 92° 96°   12/6/2018 Lt 73° 94° 96°     SHLD MMT Flexion Extension Abduction   7/11/2018 Rt 0/10  28 lbs 0/10  27 lbs 0/10  29 lbs   7/11/2018 Lt 1/10  14 lbs 1/10  15 lbs 3/10  8 lbs   9/5/18 Lt 1/10  19 lbs 1/10  22 lbs 2/10  17 lbs   10/4/2018 Lt 1/10 22 lbs 1/10  26 lbs 1/10  20 lbs   11/1/2018 Lt 1/10  25 lbs 1/10  30 lbs 1/10  23 lbs   12/6/2018 Lt 1/10  28 lbs 1/10  33 lbs 1/10  24 lbs     SHLD MMT Hor Add Ext Rotation Int Rotation   7/11/2018 Rt 0/10  29 lbs 0/10  28 lbs 0/10  29 lbs   7/11/2018 Lt 0/10  12 lbs 3/10  9 lbs 0/10  10 lbs   9/5/18 Lt 0/10  20 lbs 2/10  19 lbs 0/10  19 lbs   10/4/2018 Lt 0/10  25 lbs 1/10  23 lbs 0/10  22 lbs   11/1/2018 Lt 0/10  28 lbs 1/10  25 lbs 0/10  26 lbs   12/6/2018 Lt 0/10  31 lbs 1/10  27 lbs 0/10  29 lbs     Shoulder Screen Rotator Cuff Apprehension Anterior  Stability Posterior  Stability   Right Negative Negative Negative Negative   Left POSITIVE Negative Negative Negative     Shoulder Screen AC Joint SC Joint Drop Arm Adhesive Capsulitis   Right Negative Negative Negative Negative   Left Negative Negative POSITIVE POSITIVE

## 2018-12-12 ENCOUNTER — TRANSCRIBE ORDERS (OUTPATIENT)
Dept: PHYSICAL THERAPY | Facility: CLINIC | Age: 48
End: 2018-12-12

## 2018-12-12 DIAGNOSIS — M75.02 ADHESIVE BURSITIS OF LEFT SHOULDER: Primary | ICD-10-CM

## 2018-12-12 DIAGNOSIS — M75.82 ROTATOR CUFF TENDONITIS, LEFT: ICD-10-CM

## 2018-12-12 DIAGNOSIS — M25.512 ACUTE PAIN OF LEFT SHOULDER: ICD-10-CM

## 2018-12-12 NOTE — PROGRESS NOTES
PT Re-Evaluation     Today's date: 2018  Patient name: Dasia Lobo  : 1970  MRN: [id number]  Referring provider: Elba Zambrano MD  Dx:   Encounter Diagnosis     ICD-10-CM    1  Adhesive bursitis of left shoulder M75 02    2  Acute pain of left shoulder M25 512    3  Rotator cuff tendonitis, left M75 82      Start Time: 0745  Stop Time: 7798  Total time in clinic (min): 65 minutes    Assessment  Assessment details: Patient is slowly improving with use of her left arm / shoulder region  Overhead activities are still limited and painful  Impairments: abnormal or restricted ROM, abnormal movement, activity intolerance, impaired physical strength, pain with function and safety issue  Understanding of Dx/Px/POC: excellent  Goals  STG 2-4 weeks:   Increase UE strength by 3-6 lbs  Decrease pain by 1-2 levels on 1-10 pain scale  Increase UE PROM by 10-15 Degrees in all planes  LTG 6-8 weeks:   Increase UE strength 10-20 lbs  Demonstrate UE AROM WFL in all planes  Decrease pain to <2/10 with activity  Patient independent with HEP        Plan  Planned modality interventions: ultrasound and unattended electrical stimulation  Planned therapy interventions: joint mobilization, manual therapy, patient education, postural training, self care, strengthening, stretching, therapeutic activities, therapeutic exercise, therapeutic training, flexibility and coordination  Frequency: 3x week  Duration in weeks: 6  Treatment plan discussed with: patient      Subjective Evaluation    Pain  Quality: discomfort, dull ache, sharp, radiating, pressure and tight  Relieving factors: medications, heat, change in position, relaxation and rest  Aggravating factors: overhead activity and lifting  Progression: improved    Treatments  Current treatment: physical therapy  Patient Goals  Patient goals for therapy: independence with ADLs/IADLs, increased strength, return to sport/leisure activities, return to work, increased motion and decreased pain        Objective    Flowsheet Rows      Most Recent Value   PT/OT G-Codes   Assessment Type  Re-evaluation   G code set  Carrying, Moving & Handling Objects   Carrying, Moving and Handling Objects Current Status ()  CI   Carrying, Moving and Handling Objects Goal Status ()  CH        Precautions: Left Shoulder pain and limitations  Date of onset:  4/11/18     Date of Surgery:  None    History of Present Episode: 7/11/2018  Machado Ports states that over the past few months her left shoulder has flared up  She is really flared up this past week  Past Medical History:    7/11/2018  Machado Ports reports no past medical history  Previous Level of Functional Ability:  7/11/2018  Machado Ports states her left shoulder was fine before these current issues with no limitations  Inspection / Palpation:  7/11/2018  Mesomorphic body type  No signs of infection  No signs of wounds  No signs of drainage  No signs of ecchymotic regions  No signs of erythremic regions  Moderate signs of muscle spasm  Moderate signs of muscle guarding  Moderate signs of tenderness reported to palpation  No signs of swelling  No signs of a surgery site  Current conditions appear consistent with recent episode  Chief Complaints:  7/11/2018  Machado Ports reports moderate difficulty with bending her left shoulder  Machado Ports reports moderate difficulty with movement of her left shoulder  Machado Ports reports mild to moderate difficulty with use of her left arm  Machado Ports reports moderate difficulty with lifting / elevating her left arm  Machado Ports reports mild to moderate difficulty with sleeping  Machado Ports reports moderate difficulty with her strength and endurance  Machado Ports reports moderate limitations with her left shoulder range of motion  Machado Ports reports moderate to severe difficulty lying on her left shoulder region      SHOULDER PAIN Resting Palpation Moving Lifting Elevating   7/11/2018 Rt 0 0 0 0 0   7/11/2018 Lt 0-1 0-2 0-5 0-7 2-7   9/5/18 Lt 0-1 0-1 0-2 0-3 1-4   10/04/2018 Lt 0-1 0-1 0-1 0-2 0-3   11/1/2018 Lt 0-1 0-1 0-1 0-1 0-2   12/6/2018 Lt 0-1 0-1 0-1 0-1 0-1     SHOULDER PAIN Sleeping Throwing Pushing Pulling Twisting   7/11/2018 Rt 0 0 0 0 0   7/11/2018 Lt  0-2 NA 0-2 0-2 0-2   9/5/18 Lt 0-1 3-7 0-1 0-1 0-1   10/4/2018 Lt 0-1 2-5 0-1 0-1 0-1   11/1/2018 Lt 0-1 1-4 0-1 0-1 0-1   12/6/2018 Lt 0-1 1-3 0-1 0-1 0-1     SHOULDER AROM Flexion Extension Abduction   7/11/2018 Rt 180° 65° 180°   7/11/2018 Lt 135° 40° 135°   9/5/18 Lt 164° 56° 164°   10/4/2018 Lt 176° 64° 176°   11/1/2018 Lt 179° 68° 179°   12/6/2018 Lt 181° 70° 181°     SHOULDER AROM Hor Add Ext Rotation Int Rotation   7/11/2018 Rt 75° 95° 95°   7/11/2018 Lt 45° 65° 52°   9/5/18 Lt 62° 82° 92°   10/4/2018 Lt 68° 90° 65°   11/1/2018 Lt 71° 92° 96°   12/6/2018 Lt 73° 94° 96°     SHLD MMT Flexion Extension Abduction   7/11/2018 Rt 0/10  28 lbs 0/10  27 lbs 0/10  29 lbs   7/11/2018 Lt 1/10  14 lbs 1/10  15 lbs 3/10  8 lbs   9/5/18 Lt 1/10  19 lbs 1/10  22 lbs 2/10  17 lbs   10/4/2018 Lt 1/10 22 lbs 1/10  26 lbs 1/10  20 lbs   11/1/2018 Lt 1/10  25 lbs 1/10  30 lbs 1/10  23 lbs   12/6/2018 Lt 1/10  28 lbs 1/10  33 lbs 1/10  24 lbs     SHLD MMT Hor Add Ext Rotation Int Rotation   7/11/2018 Rt 0/10  29 lbs 0/10  28 lbs 0/10  29 lbs   7/11/2018 Lt 0/10  12 lbs 3/10  9 lbs 0/10  10 lbs   9/5/18 Lt 0/10  20 lbs 2/10  19 lbs 0/10  19 lbs   10/4/2018 Lt 0/10  25 lbs 1/10  23 lbs 0/10  22 lbs   11/1/2018 Lt 0/10  28 lbs 1/10  25 lbs 0/10  26 lbs   12/6/2018 Lt 0/10  31 lbs 1/10  27 lbs 0/10  29 lbs     Shoulder Screen Rotator Cuff Apprehension Anterior  Stability Posterior  Stability   Right Negative Negative Negative Negative   Left POSITIVE Negative Negative Negative     Shoulder Screen AC Joint SC Joint Drop Arm Adhesive Capsulitis   Right Negative Negative Negative Negative   Left Negative Negative POSITIVE POSITIVE

## 2018-12-13 ENCOUNTER — OFFICE VISIT (OUTPATIENT)
Dept: PHYSICAL THERAPY | Facility: CLINIC | Age: 48
End: 2018-12-13
Payer: COMMERCIAL

## 2018-12-13 DIAGNOSIS — M75.02 ADHESIVE BURSITIS OF LEFT SHOULDER: Primary | ICD-10-CM

## 2018-12-13 DIAGNOSIS — M25.512 ACUTE PAIN OF LEFT SHOULDER: ICD-10-CM

## 2018-12-13 DIAGNOSIS — M75.82 ROTATOR CUFF TENDONITIS, LEFT: ICD-10-CM

## 2018-12-13 PROCEDURE — 97110 THERAPEUTIC EXERCISES: CPT

## 2018-12-13 PROCEDURE — 97140 MANUAL THERAPY 1/> REGIONS: CPT

## 2018-12-13 PROCEDURE — 97014 ELECTRIC STIMULATION THERAPY: CPT

## 2018-12-13 NOTE — PROGRESS NOTES
Daily Note     Today's date: 2018  Patient name: Ramana Das  : 1970  MRN: [id number]  Referring provider: Macey Zaavla MD  Dx:   Encounter Diagnosis     ICD-10-CM    1  Adhesive bursitis of left shoulder M75 02    2  Acute pain of left shoulder M25 512    3  Rotator cuff tendonitis, left M75 82      Subjective: "My L shoulder is so much better these last two weeks! I feel like since you've been stretching my neck as well as my L shoulder there has been such a significant difference in my pain level  In the past two weeks the worst pain I've had in my L shoulder would be a 2-3/10 and at best a 0/10 "    Objective: See treatment diary below    Assessment: Tolerated treatment well  Patient exhibited good technique with therapeutic exercises and would benefit from continued PT to decrease L shoulder pain and to increase L shoulder ROM/strength  Patient very pleased with her progress here at physical therapy  "I'm really happy with having little to no pain in my L shoulder these past two weeks!"    Plan: Continue per plan of care  Progress treatment as tolerated        Manual  11/15 11/23 11/29 12/6 12/13   MT, ROM 20' 20' 20' 20' 30'   Supine ROM 10' 10' 5'             Exercise Diary  11/15 11/23 11/29 12/6 12/13   [facility] [place]        [place]-Codman's 5# 30x 5# 30x 5# 30x  5# 30x   [facility]-Curls,Tri 5# 30x 5# 30x 5# 30x  5# 30x   Finger Ladder 3x ea 3x ea 3x ea 3x ea    Alec-BP,PD,Lats,Row   25# 30x 25# 30x    W/P-PNF,IR,ER,PU,PS,Throw-Top,Mid,Bot 10# 30x 10# 30x 10# 30x 10# 30x 10# 30x   WP-OH,IR 2x5' 2x5' 2x5' 2x5' 2x5'   Wall Slides-IYV/BallStabExs 3x10 3x10  3x10    Wall-OH Ball     10x 10x ea   ME, PE 15' 15' 15' 15' 15'           Modalities 11/15 11/23 11/29 12/6 12/13   MH&ES 20' 20' NT 10' 20'   US 10' 10' 10' NT NT

## 2018-12-20 ENCOUNTER — OFFICE VISIT (OUTPATIENT)
Dept: PHYSICAL THERAPY | Facility: CLINIC | Age: 48
End: 2018-12-20
Payer: COMMERCIAL

## 2018-12-20 DIAGNOSIS — M75.82 ROTATOR CUFF TENDONITIS, LEFT: ICD-10-CM

## 2018-12-20 DIAGNOSIS — M75.02 ADHESIVE BURSITIS OF LEFT SHOULDER: Primary | ICD-10-CM

## 2018-12-20 DIAGNOSIS — M25.512 ACUTE PAIN OF LEFT SHOULDER: ICD-10-CM

## 2018-12-20 PROCEDURE — 97035 APP MDLTY 1+ULTRASOUND EA 15: CPT

## 2018-12-20 PROCEDURE — 97140 MANUAL THERAPY 1/> REGIONS: CPT

## 2018-12-20 PROCEDURE — 97014 ELECTRIC STIMULATION THERAPY: CPT

## 2018-12-20 PROCEDURE — 97110 THERAPEUTIC EXERCISES: CPT

## 2018-12-20 NOTE — PROGRESS NOTES
Daily Note     Today's date: 2018  Patient name: iLng Chambers  : 1970  MRN: [id number]  Referring provider: Lona Claros MD  Dx:   Encounter Diagnosis     ICD-10-CM    1  Adhesive bursitis of left shoulder M75 02    2  Acute pain of left shoulder M25 512    3  Rotator cuff tendonitis, left M75 82      Subjective: "I've been feeling really good! I'd say my L arm is at about 98%  I am actually able to sleep on my stomach now! I haven't been able to sleep on my stomach for years  I'm going to take a break from therapy after today to see how I do; hopefully, I do well without therapy and then I can be discharged  This is our busy season at work so I'd have a hard time getting to therapy anyway "    Objective: See treatment diary below    Assessment: Tolerated treatment well  Patient exhibited good technique with therapeutic exercises and would benefit from continued PT to keep L shoulder pain at a 0/10 and to increase L shoulder ROM/strength  Plan: Continue per plan of care  Progress treatment as tolerated        Manual     MT, ROM 20'    30'   Supine ROM 10'               Exercise Diary     [facility] [place]        FWC-Codman's 5# 30x    5# 30x   FWC-Curls,Tri 5# 30x    5# 30x   Finger Ladder 3x ea       Alec-BP,PD,Lats,Row        W/P-PNF,IR,ER,PU,PS,Throw-Top,Mid,Bot 10# 30x    10# 30x   WP-OH,IR 2x5'    2x5'   Wall Slides-IYV/BallStabExs 3x10       Wall-OH Ball  10x ea    10x ea   ME, PE 15'    15'           Modalities    MH&ES 20'    20'   US 10'    NT

## 2019-01-03 ENCOUNTER — OFFICE VISIT (OUTPATIENT)
Dept: PHYSICAL THERAPY | Facility: CLINIC | Age: 49
End: 2019-01-03
Payer: COMMERCIAL

## 2019-01-03 DIAGNOSIS — M75.02 ADHESIVE BURSITIS OF LEFT SHOULDER: Primary | ICD-10-CM

## 2019-01-03 DIAGNOSIS — M75.82 ROTATOR CUFF TENDONITIS, LEFT: ICD-10-CM

## 2019-01-03 DIAGNOSIS — M25.512 ACUTE PAIN OF LEFT SHOULDER: ICD-10-CM

## 2019-01-03 PROCEDURE — G8986 CARRY D/C STATUS: HCPCS | Performed by: PHYSICAL THERAPIST

## 2019-01-03 PROCEDURE — G8985 CARRY GOAL STATUS: HCPCS | Performed by: PHYSICAL THERAPIST

## 2019-01-03 NOTE — LETTER
January 3, 2019    Lita Foote MD  [address] [address]    Patient: Divine Villalta   YOB: 1970   Date of Visit: 1/3/2019     Encounter Diagnosis     ICD-10-CM    1  Adhesive bursitis of left shoulder M75 02    2  Acute pain of left shoulder M25 512    3  Rotator cuff tendonitis, left M75 82      PT Discharge    Dear Dr Reji Parr:    Please review the attached Plan of Care from [facility] recent visit  Please verify that you agree therapy should discontinue by signing the attached document and sending it back to our office  Divine Villalta has not returned for more treatment  Divine Villalta did not attend today's appointment  I cannot provide you with current progress information but I can provide you with information estimates based on previous performance  G Codes were enter to close Divine Villalta episode and are estimates  Divine Villalta is discharged at this time  If you have any questions or concerns, please don't hesitate to call  Sincerely,    Michelle Baig, PT      Referring Provider:      I certify that I have read the below Plan of Care and certify the need for these services furnished under this plan of treatment while under my care  Lita Foote MD  [facility] [address]: [phone]          PT Discharge    Today's date: 1/3/2019  Patient name: Divine Villalta  : 1970  MRN: [id number]  Referring provider: Disha Gutierrez MD  Dx:   Encounter Diagnosis     ICD-10-CM    1  Adhesive bursitis of left shoulder M75 02    2  Acute pain of left shoulder M25 512    3   Rotator cuff tendonitis, left M75 82      Assessment/Plan    Subjective    Objective    Flowsheet Rows      Most Recent Value   PT/OT G-Codes   Assessment Type  Discharge   G code set  Carrying, Moving & Handling Objects   Carrying, Moving and Handling Objects Goal Status ()  [facility]   Carrying, Moving and Handling Objects Discharge Status ()  Linda Jack has not returned for more treatment  Kimberly Forbes did not attend today's appointment  I cannot provide you with current progress information but I can provide you with information estimates based on previous performance  G Codes were enter to close Kimberly Pont episode and are estimates  Kimberly Pont is discharged at this time

## 2019-01-03 NOTE — LETTER
January 3, 2019    Paul Arceo MD  [address] [address]    Patient: Radha Mars   YOB: 1970   Date of Visit: 1/3/2019     Encounter Diagnosis     ICD-10-CM    1  Adhesive bursitis of left shoulder M75 02    2  Acute pain of left shoulder M25 512    3  Rotator cuff tendonitis, left M75 82        Dear Dr Nigel Villaseñor:    Please review the attached Plan of Care from [facility] recent visit  Please verify that you agree therapy should continue by signing the attached document and sending it back to our office  If you have any questions or concerns, please don't hesitate to call  Sincerely,    Sandoval Tavares PT      Referring Provider:      I certify that I have read the below Plan of Care and certify the need for these services furnished under this plan of treatment while under my care  Paul Arceo MD  [facility] [address]: [phone]          PT Discharge    Today's date: 1/3/2019  Patient name: Radha Mars  : 1970  MRN: [id number]  Referring provider: Ashley Gil MD  Dx:   Encounter Diagnosis     ICD-10-CM    1  Adhesive bursitis of left shoulder M75 02    2  Acute pain of left shoulder M25 512    3  Rotator cuff tendonitis, left M75 82      Assessment/Plan    Subjective    Objective    Flowsheet Rows      Most Recent Value   PT/OT G-Codes   Assessment Type  Discharge   G code set  Carrying, Moving & Handling Objects   Carrying, Moving and Handling Objects Goal Status ()  [address] [place] [address]   Carrying, Moving and Handling Objects Discharge Status ()  Norma Orellana has not returned for more treatment  Radha Mars did not attend today's appointment  I cannot provide you with current progress information but I can provide you with information estimates based on previous performance  G Codes were enter to close Radha Mars episode and are estimates    Radha Mars is discharged at this time

## 2019-01-03 NOTE — PROGRESS NOTES
PT Discharge    Today's date: 1/3/2019  Patient name: Amish Crawford  : 1970  MRN: [id number]  Referring provider: Jinny Miner MD  Dx:   Encounter Diagnosis     ICD-10-CM    1  Adhesive bursitis of left shoulder M75 02    2  Acute pain of left shoulder M25 512    3  Rotator cuff tendonitis, left M75 82      Assessment/Plan    Subjective    Objective    Flowsheet Rows      Most Recent Value   PT/OT G-Codes   Assessment Type  Discharge   G code set  Carrying, Moving & Handling Objects   Carrying, Moving and Handling Objects Goal Status ()  [address] [place] [address]   Carrying, Moving and Handling Objects Discharge Status ()  Aldo Edge has not returned for more treatment  Amish Crawford did not attend today's appointment  I cannot provide you with current progress information but I can provide you with information estimates based on previous performance  G Codes were enter to close Amish Crawford episode and are estimates  Amish Crawford is discharged at this time

## 2019-01-08 ENCOUNTER — OFFICE VISIT (OUTPATIENT)
Dept: PHYSICAL THERAPY | Facility: CLINIC | Age: 49
End: 2019-01-08
Payer: COMMERCIAL

## 2019-01-08 DIAGNOSIS — M75.82 ROTATOR CUFF TENDONITIS, LEFT: ICD-10-CM

## 2019-01-08 DIAGNOSIS — M75.02 ADHESIVE BURSITIS OF LEFT SHOULDER: Primary | ICD-10-CM

## 2019-01-08 DIAGNOSIS — M25.512 ACUTE PAIN OF LEFT SHOULDER: ICD-10-CM

## 2019-01-08 DIAGNOSIS — M77.8 TENDINITIS OF LEFT SHOULDER: ICD-10-CM

## 2019-01-08 PROCEDURE — 97014 ELECTRIC STIMULATION THERAPY: CPT

## 2019-01-08 PROCEDURE — 97140 MANUAL THERAPY 1/> REGIONS: CPT

## 2019-01-08 PROCEDURE — 97110 THERAPEUTIC EXERCISES: CPT

## 2019-01-08 PROCEDURE — 97035 APP MDLTY 1+ULTRASOUND EA 15: CPT

## 2019-01-08 NOTE — PROGRESS NOTES
Daily Note     Today's date: 2019  Patient name: Cong Lim  : 1970  MRN: [id number]  Referring provider: Avelino Sebastian MD  Dx:   Encounter Diagnosis     ICD-10-CM    1  Adhesive bursitis of left shoulder M75 02    2  Acute pain of left shoulder M25 512    3  Tendinitis of left shoulder M75 82    4  Rotator cuff tendonitis, left M75 82      Subjective: "This is our busy time at work  We've been having 60 hour work weeks  My L shoulder is flared up  I can feel it coming from my shoulder blade up to the front of my L shoulder "  Patient reports pain scale of 6/10 upon arriving to physical therapy this morning  Objective: See treatment diary below    Assessment: Tolerated treatment well  Patient exhibited good technique with therapeutic exercises and would benefit from continued PT to decrease L shoulder pain and inflammation  Tightness/spasm present throughout L shoulder girdle region 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  Patient did report relief of symptoms post manual therapy  Patient reports pain scale of 3/10 post tx  Plan: Continue per plan of care  Progress treatment as tolerated        Manual        MT, ROM 20' 10'      Supine ROM 10' 20'              Exercise Diary        UBC        FWC-Codman's 5# 30x 5# 30x      FWC-Curls,Tri 5# 30x 5# 30x      Finger Ladder 3x ea 3x ea      Alec-BP,PD,Lats,Row        W/P-PNF,IR,ER,PU,PS,Throw-Top,Mid,Bot 10# 30x 10# 30x ea      WP-OH,IR 2x5' 2x5'      Wall Slides-IYV/BallStabExs 3x10       Wall-OH Ball  10x ea 3x10 ea      ME, PE 15' 15'              Modalities       MH&ES 20' 20'      US 10' 10'

## 2019-01-15 ENCOUNTER — OFFICE VISIT (OUTPATIENT)
Dept: PHYSICAL THERAPY | Facility: CLINIC | Age: 49
End: 2019-01-15
Payer: COMMERCIAL

## 2019-01-15 DIAGNOSIS — M25.512 ACUTE PAIN OF LEFT SHOULDER: ICD-10-CM

## 2019-01-15 DIAGNOSIS — M77.8 TENDINITIS OF LEFT SHOULDER: ICD-10-CM

## 2019-01-15 DIAGNOSIS — M75.02 ADHESIVE BURSITIS OF LEFT SHOULDER: Primary | ICD-10-CM

## 2019-01-15 DIAGNOSIS — M75.82 ROTATOR CUFF TENDONITIS, LEFT: ICD-10-CM

## 2019-01-15 PROCEDURE — 97014 ELECTRIC STIMULATION THERAPY: CPT

## 2019-01-15 PROCEDURE — 97110 THERAPEUTIC EXERCISES: CPT

## 2019-01-15 PROCEDURE — 97140 MANUAL THERAPY 1/> REGIONS: CPT

## 2019-01-15 NOTE — PROGRESS NOTES
Daily Note     Today's date: 1/15/2019  Patient name: Praveena Alanis  : 1970  MRN: [id number]  Referring provider: Arjun Ruiz MD  Dx:   Encounter Diagnosis     ICD-10-CM    1  Adhesive bursitis of left shoulder M75 02    2  Acute pain of left shoulder M25 512    3  Tendinitis of left shoulder M75 82    4  Rotator cuff tendonitis, left M75 82      Subjective: No new c/o pain today  "My L shoulder feels much better! I think it was a lot of stress  I also got a standing desk and that's been helping too "    Objective: See treatment diary below    Assessment: Tolerated treatment well  Patient exhibited good technique with therapeutic exercises and would benefit from continued PT to decrease L shoulder pain and to increase L shoulder ROM/strength  "My shoulder feels really good after my treatment "    Plan: Continue per plan of care  Progress treatment as tolerated        Manual  12/20 1/8 1/15     MT, ROM 20' 10' 20'     Supine ROM 10' 20'              Exercise Diary  12/20 1/8 1/15     UBC        FWC-Codman's 5# 30x 5# 30x 5# 30x     FWC-Curls,Tri 5# 30x 5# 30x 5# 30x     Finger Ladder 3x ea 3x ea      Alec-BP,PD,Lats,Row        W/P-PNF,IR,ER,PU,PS,Throw-Top,Mid,Bot 10# 30x 10# 30x ea 10# 30x ea     WP-OH,IR 2x5' 2x5' 2x5'     Wall Slides-IYV/BallStabExs 3x10       Wall-OH Ball  10x ea 3x10 ea 3x10x ea     ME, PE 15' 15' 15'             Modalities 12/20 1/8 1/15     MH&ES 20' 20' 20'     US 10' 10' NT

## 2019-01-24 ENCOUNTER — OFFICE VISIT (OUTPATIENT)
Dept: PHYSICAL THERAPY | Facility: CLINIC | Age: 49
End: 2019-01-24
Payer: COMMERCIAL

## 2019-01-24 DIAGNOSIS — M77.8 TENDINITIS OF LEFT SHOULDER: ICD-10-CM

## 2019-01-24 DIAGNOSIS — M75.82 ROTATOR CUFF TENDONITIS, LEFT: ICD-10-CM

## 2019-01-24 DIAGNOSIS — M25.512 ACUTE PAIN OF LEFT SHOULDER: ICD-10-CM

## 2019-01-24 DIAGNOSIS — M75.02 ADHESIVE BURSITIS OF LEFT SHOULDER: Primary | ICD-10-CM

## 2019-01-24 PROCEDURE — 97110 THERAPEUTIC EXERCISES: CPT

## 2019-01-24 PROCEDURE — 97014 ELECTRIC STIMULATION THERAPY: CPT

## 2019-01-24 PROCEDURE — 97140 MANUAL THERAPY 1/> REGIONS: CPT

## 2019-01-24 NOTE — LETTER
2019    Rolan Aguillon MD  [address] [address]    Patient: Suellen Madera   YOB: 1970   Date of Visit: 2019     Encounter Diagnosis     ICD-10-CM    1  Adhesive bursitis of left shoulder M75 02    2  Acute pain of left shoulder M25 512    3  Tendinitis of left shoulder M75 82    4  Rotator cuff tendonitis, left M75 82        Dear Dr Farias Alu:    Please review the attached Plan of Care from [facility] recent visit  Please verify that you agree therapy should discontinue by signing the attached document and sending it back to our office  Suellen Madera has not returned for more treatment  Suellen Madera did not attend today's appointment  I cannot provide you with a current progress report but I can provide you with information based on previous performance  Suellen Madera is discharged at this time  If you have any questions or concerns, please don't hesitate to call  Sincerely,    Kimberlyn Piper PT      Referring Provider:      I certify that I have read the below Plan of Care and certify the need for these services furnished under this plan of treatment while under my care  Rolan Aguillon MD  [address]: [phone]          Daily Note     Today's date: 2019  Patient name: Suellen Madera  : 1970  MRN: [id number]  Referring provider: Chris Cabrales MD  Dx:   Encounter Diagnosis     ICD-10-CM    1  Adhesive bursitis of left shoulder M75 02    2  Acute pain of left shoulder M25 512    3  Tendinitis of left shoulder M75 82    4  Rotator cuff tendonitis, left M75 82      Subjective: No new c/o pain today  "My L shoulder feels great today! I think a lot of the flare ups are due to stress "    Objective: See treatment diary below    Assessment: Tolerated treatment well   Patient exhibited good technique with therapeutic exercises and would benefit from continued PT to increase L shoulder strength and ROM  Post manual therapy patient reports, "I don't think my L shoulder could feel any better than it does right now "    Plan: Continue per plan of care  Progress treatment as tolerated  Manual  12/20 1/8 1/15 1/24    MT, ROM 20' 10' 20' 30'    Supine ROM 10' 20'              Exercise Diary  12/20 1/8 1/15 1/24    UBC        FWC-Codman's 5# 30x 5# 30x 5# 30x 5# 30x    FWC-Curls,Tri 5# 30x 5# 30x 5# 30x 5# 30x    Finger Ladder 3x ea 3x ea      Alec-BP,PD,Lats,Row        W/P-PNF,IR,ER,PU,PS,Throw-Top,Mid,Bot 10# 30x 10# 30x ea 10# 30x ea 10# 30x ea    WP-OH,IR 2x5' 2x5' 2x5' 2x5'    Wall Slides-IYV/BallStabExs 3x10       Wall-OH Ball  10x ea 3x10 ea 3x10x ea 3x10x ea    ME, PE 15' 15' 15' 15'            Modalities 12/20 1/8 1/15 1/24    MH&ES 20' 20' 20' 20'    US 10' 10' NT NT                      2019    PT Discharge    Today's date: 2019  Patient name: Shanda Nam  : 1970  MRN: [id number]  Referring provider: Puja Cox MD  Dx:   Encounter Diagnosis     ICD-10-CM    1  Adhesive bursitis of left shoulder M75 02    2  Acute pain of left shoulder M25 512    3  Tendinitis of left shoulder M75 82    4  Rotator cuff tendonitis, left M75 82        Plan  Plan details: Discharge      Subjective Evaluation    Patient Goals  Patient goal: Discharge      Objective     Shanda Nam has not returned for more treatment  Shanda Nam did not attend today's appointment  I cannot provide you with a current progress report but I can provide you with information based on previous performance  Shanda Nam is discharged at this time

## 2019-01-24 NOTE — LETTER
2019    Rolan Aguillon MD  [address] [address]    Patient: Suellen Madera   YOB: 1970   Date of Visit: 2019     Encounter Diagnosis     ICD-10-CM    1  Adhesive bursitis of left shoulder M75 02    2  Acute pain of left shoulder M25 512    3  Tendinitis of left shoulder M75 82    4  Rotator cuff tendonitis, left M75 82        PT Discharge    Dear Dr Farias Alu:    Please review the attached Plan of Care from [facility] recent visit  Please verify that you agree therapy should discontinue by signing the attached document and sending it back to our office  Suellen Madera has not returned for more treatment  Suellen Madera did not attend today's appointment  I cannot provide you with a current progress report but I can provide you with information based on previous performance  Suellen Madera is discharged at this time  If you have any questions or concerns, please don't hesitate to call  Sincerely,    Kimberlyn Piper PT      Referring Provider:      I certify that I have read the below Plan of Care and certify the need for these services furnished under this plan of treatment while under my care  Rolan Aguillon MD  [address]: [phone]          Daily Note     Today's date: 2019  Patient name: Suellen Madera  : 1970  MRN: [id number]  Referring provider: Chris Cabrales MD  Dx:   Encounter Diagnosis     ICD-10-CM    1  Adhesive bursitis of left shoulder M75 02    2  Acute pain of left shoulder M25 512    3  Tendinitis of left shoulder M75 82    4  Rotator cuff tendonitis, left M75 82      Subjective: No new c/o pain today  "My L shoulder feels great today! I think a lot of the flare ups are due to stress "    Objective: See treatment diary below    Assessment: Tolerated treatment well   Patient exhibited good technique with therapeutic exercises and would benefit from continued PT to increase L shoulder strength and ROM  Post manual therapy patient reports, "I don't think my L shoulder could feel any better than it does right now "    Plan: Continue per plan of care  Progress treatment as tolerated  Manual  12/20 1/8 1/15 1/24    MT, ROM 20' 10' 20' 30'    Supine ROM 10' 20'              Exercise Diary  12/20 1/8 1/15 1/24    UBC        FWC-Codman's 5# 30x 5# 30x 5# 30x 5# 30x    FWC-Curls,Tri 5# 30x 5# 30x 5# 30x 5# 30x    Finger Ladder 3x ea 3x ea      Alec-BP,PD,Lats,Row        W/P-PNF,IR,ER,PU,PS,Throw-Top,Mid,Bot 10# 30x 10# 30x ea 10# 30x ea 10# 30x ea    WP-OH,IR 2x5' 2x5' 2x5' 2x5'    Wall Slides-IYV/BallStabExs 3x10       Wall-OH Ball  10x ea 3x10 ea 3x10x ea 3x10x ea    ME, PE 15' 15' 15' 15'            Modalities 12/20 1/8 1/15 1/24    MH&ES 20' 20' 20' 20'    US 10' 10' NT NT                      2019    PT Discharge    Today's date: 2019  Patient name: Ling Chambers  : 1970  MRN: [id number]  Referring provider: Lona Claros MD  Dx:   Encounter Diagnosis     ICD-10-CM    1  Adhesive bursitis of left shoulder M75 02    2  Acute pain of left shoulder M25 512    3  Tendinitis of left shoulder M75 82    4  Rotator cuff tendonitis, left M75 82        Plan  Plan details: Discharge      Subjective Evaluation    Patient Goals  Patient goal: Discharge      Objective     Ling Chambers has not returned for more treatment  Ling Chambers did not attend today's appointment  I cannot provide you with a current progress report but I can provide you with information based on previous performance  Ling Chambers is discharged at this time

## 2019-01-24 NOTE — PROGRESS NOTES
Daily Note     Today's date: 2019  Patient name: Niall Ha  : 1970  MRN: [id number]  Referring provider: Zoë Jennings MD  Dx:   Encounter Diagnosis     ICD-10-CM    1  Adhesive bursitis of left shoulder M75 02    2  Acute pain of left shoulder M25 512    3  Tendinitis of left shoulder M75 82    4  Rotator cuff tendonitis, left M75 82      Subjective: No new c/o pain today  "My L shoulder feels great today! I think a lot of the flare ups are due to stress "    Objective: See treatment diary below    Assessment: Tolerated treatment well  Patient exhibited good technique with therapeutic exercises and would benefit from continued PT to increase L shoulder strength and ROM  Post manual therapy patient reports, "I don't think my L shoulder could feel any better than it does right now "    Plan: Continue per plan of care  Progress treatment as tolerated        Manual  12/20 1/8 1/15 1/24    MT, ROM 20' 10' 20' 30'    Supine ROM 10' 20'              Exercise Diary  12/20 1/8 1/15 1/24    UBC        FWC-Codman's 5# 30x 5# 30x 5# 30x 5# 30x    FWC-Curls,Tri 5# 30x 5# 30x 5# 30x 5# 30x    Finger Ladder 3x ea 3x ea      Alec-BP,PD,Lats,Row        W/P-PNF,IR,ER,PU,PS,Throw-Top,Mid,Bot 10# 30x 10# 30x ea 10# 30x ea 10# 30x ea    WP-OH,IR 2x5' 2x5' 2x5' 2x5'    Wall Slides-IYV/BallStabExs 3x10       Wall-OH Ball  10x ea 3x10 ea 3x10x ea 3x10x ea    ME, PE 15' 15' 15' 15'            Modalities 12/20 1/8 1/15 1/24    MH&ES 20' 20' 20' 20'    US 10' 10' NT NT

## 2019-02-20 ENCOUNTER — TRANSCRIBE ORDERS (OUTPATIENT)
Dept: PHYSICAL THERAPY | Facility: CLINIC | Age: 49
End: 2019-02-20

## 2019-02-20 DIAGNOSIS — M77.8 TENDINITIS OF LEFT SHOULDER: ICD-10-CM

## 2019-02-20 DIAGNOSIS — M25.512 ACUTE PAIN OF LEFT SHOULDER: ICD-10-CM

## 2019-02-20 DIAGNOSIS — M75.02 ADHESIVE BURSITIS OF LEFT SHOULDER: Primary | ICD-10-CM

## 2019-02-20 DIAGNOSIS — M75.82 ROTATOR CUFF TENDONITIS, LEFT: ICD-10-CM

## 2019-02-20 NOTE — PROGRESS NOTES
PT Discharge    Today's date: 2019  Patient name: Jagjit Ulrich  : 1970  MRN: [id number]  Referring provider: Pablo Silverio MD  Dx:   Encounter Diagnosis     ICD-10-CM    1  Adhesive bursitis of left shoulder M75 02    2  Acute pain of left shoulder M25 512    3  Tendinitis of left shoulder M75 82    4  Rotator cuff tendonitis, left M75 82        Plan  Plan details: Discharge      Subjective Evaluation    Patient Goals  Patient goal: Discharge      Objective     Jagjit Ulrich has not returned for more treatment  Jagjit Ulrich did not attend today's appointment  I cannot provide you with a current progress report but I can provide you with information based on previous performance  Jagjit Ulrich is discharged at this time

## 2019-10-04 ENCOUNTER — OFFICE VISIT (OUTPATIENT)
Dept: OBGYN CLINIC | Facility: CLINIC | Age: 49
End: 2019-10-04
Payer: COMMERCIAL

## 2019-10-04 VITALS
WEIGHT: 138.2 LBS | BODY MASS INDEX: 24.49 KG/M2 | DIASTOLIC BLOOD PRESSURE: 68 MMHG | SYSTOLIC BLOOD PRESSURE: 122 MMHG | HEIGHT: 63 IN

## 2019-10-04 DIAGNOSIS — Z12.31 ENCOUNTER FOR SCREENING MAMMOGRAM FOR BREAST CANCER: ICD-10-CM

## 2019-10-04 DIAGNOSIS — N92.1 MENORRHAGIA WITH IRREGULAR CYCLE: ICD-10-CM

## 2019-10-04 DIAGNOSIS — Z01.411 ENCOUNTER FOR GYNECOLOGICAL EXAMINATION WITH ABNORMAL FINDING: Primary | ICD-10-CM

## 2019-10-04 PROCEDURE — 99386 PREV VISIT NEW AGE 40-64: CPT | Performed by: NURSE PRACTITIONER

## 2019-10-04 RX ORDER — MOMETASONE FUROATE 50 UG/1
2 SPRAY, METERED NASAL
COMMUNITY
Start: 2019-09-20 | End: 2020-05-17

## 2019-10-04 NOTE — PROGRESS NOTES
Assessment / Plan    1  Encounter for gynecological examination with abnormal finding  Normal well woman exam   pap reportedly normal-- request record    2  Encounter for screening mammogram for breast cancer  Order provided    - Mammo screening bilateral w 3d & cad; Future    3  Menorrhagia with irregular cycle  Explained rationale for further evaluation with US and endometrial sampling  Order provided for US and RV in 2w  - US pelvis complete non OB; Future      Subjective      Goyo Brian is a 50 y o  female who presents for her annual gynecologic exam   NEW PATIENT    Prior patient of Dr Alexander Funez who is retiring  Last pap: 2018- reportedly normal, no report on chart  Last mammogram: 10/2018, 3D negative  Sexually active: yes    GYN hx: ; h/o infertility, clomid; conceived both children naturally  Neg hx of abnormal paps or STDs  Monogamous 32 yr relationship with her   PMHx: aortic disorder, MVP, anxiety, infertility    Periods are irregular, lasting 3-14 days, heavy  Dysmenorrhea:severe  Current contraception: none  History of abnormal Pap smear: no  Family history of breast,uterine, ovarian or colon cancer: yes - mother, breast ca age 71  Menstrual History:  OB History        2    Para   2    Term                AB        Living   2       SAB        TAB        Ectopic        Multiple        Live Births               Obstetric Comments   Hx of clomid treatments; two pregnancies conceived spontaneous  Menarche age: 15  Patient's last menstrual period was 2019 (exact date)    Period Cycle (Days): (Q 3w - 3 mo)  Period Duration (Days): (Q 3 - 14 days)  Period Pattern: (!) Irregular  Menstrual Flow: Heavy  Menstrual Control: Maxi pad, Tampon  Menstrual Control Change Freq (Hours): 2 hours  Dysmenorrhea: (!) Severe  Dysmenorrhea Symptoms: Cramping    The following portions of the patient's history were reviewed and updated as appropriate: allergies, current medications, past family history, past medical history, past social history, past surgical history and problem list     Review of Systems      Review of Systems   Constitutional: Negative for chills and fever  Gastrointestinal: Negative for abdominal distention, abdominal pain, blood in stool, constipation, diarrhea, nausea and vomiting  Genitourinary: Positive for menstrual problem  Negative for difficulty urinating, dysuria, frequency, genital sores, hematuria, pelvic pain, urgency, vaginal bleeding and vaginal discharge  Breasts:  Negative for skin changes, dimpling, asymmetry, nipple discharge, redness, tenderness or palpable masses    Objective      /68 (BP Location: Left arm, Patient Position: Sitting, Cuff Size: Standard)   Ht 5' 3" (1 6 m)   Wt 62 7 kg (138 lb 3 2 oz)   LMP 09/14/2019 (Exact Date)   BMI 24 48 kg/m²      Physical Exam   Constitutional: She is oriented to person, place, and time  She appears well-developed and well-nourished  No distress  HENT:   Head: Normocephalic and atraumatic  Eyes: Pupils are equal, round, and reactive to light  Neck: Neck supple  No thyromegaly present  Pulmonary/Chest: Effort normal  Right breast exhibits no inverted nipple, no mass, no nipple discharge, no skin change and no tenderness  Left breast exhibits no inverted nipple, no mass, no nipple discharge, no skin change and no tenderness  Breasts are symmetrical    Abdominal: Soft  Normal appearance  She exhibits no mass  There is no tenderness  There is no CVA tenderness  Genitourinary: Rectum normal and uterus normal  Pelvic exam was performed with patient supine  No labial fusion  There is no rash, tenderness, lesion or injury on the right labia  There is no rash, tenderness, lesion or injury on the left labia  Uterus is not enlarged and not tender  Cervix exhibits no motion tenderness, no discharge and no friability   Right adnexum displays no mass, no tenderness and no fullness  Left adnexum displays no mass, no tenderness and no fullness  No erythema, tenderness or bleeding in the vagina  No foreign body in the vagina  No signs of injury around the vagina  No vaginal discharge found  Lymphadenopathy:     She has no cervical adenopathy  She has no axillary adenopathy  Right: No inguinal and no supraclavicular adenopathy present  Left: No inguinal and no supraclavicular adenopathy present  Neurological: She is alert and oriented to person, place, and time  She is not disoriented  Skin: Skin is warm, dry and intact  Psychiatric: She has a normal mood and affect   Her behavior is normal  Thought content normal

## 2019-10-09 ENCOUNTER — DOCTOR'S OFFICE (OUTPATIENT)
Dept: URBAN - NONMETROPOLITAN AREA CLINIC 1 | Facility: CLINIC | Age: 49
Setting detail: OPHTHALMOLOGY
End: 2019-10-09
Payer: COMMERCIAL

## 2019-10-09 DIAGNOSIS — H40.033: ICD-10-CM

## 2019-10-09 DIAGNOSIS — H01.001: ICD-10-CM

## 2019-10-09 DIAGNOSIS — H01.002: ICD-10-CM

## 2019-10-09 DIAGNOSIS — H10.501: ICD-10-CM

## 2019-10-09 DIAGNOSIS — H01.004: ICD-10-CM

## 2019-10-09 PROCEDURE — 99214 OFFICE O/P EST MOD 30 MIN: CPT | Performed by: OPHTHALMOLOGY

## 2019-10-09 PROCEDURE — 92132 CPTRZD OPH DX IMG ANT SGM: CPT | Performed by: OPHTHALMOLOGY

## 2019-10-09 ASSESSMENT — LID EXAM ASSESSMENTS
OS_BLEPHARITIS: LLL LUL T
OD_BLEPHARITIS: RLL RUL T

## 2019-10-10 ASSESSMENT — REFRACTION_CURRENTRX
OD_SPHERE: PL
OS_ADD: +1.50
OD_ADD: +1.50
OD_OVR_VA: 20/
OS_VPRISM_DIRECTION: PROGS
OD_CYLINDER: -1.50
OS_CYLINDER: -2.00
OD_OVR_VA: 20/
OS_OVR_VA: 20/
OD_OVR_VA: 20/
OD_AXIS: 167
OS_AXIS: 026
OS_OVR_VA: 20/
OD_VPRISM_DIRECTION: PROGS
OS_SPHERE: +0.50
OS_OVR_VA: 20/

## 2019-10-10 ASSESSMENT — REFRACTION_MANIFEST
OD_VA2: 20/
OS_VA2: 20/
OD_VA1: 20/20
OS_VA2: 20/
OD_AXIS: 167
OU_VA: 20/
OS_VA1: 20/20
OD_ADD: +2.00
OS_VA3: 20/
OS_SPHERE: PL-
OD_VA3: 20/
OD_CYLINDER: -1.50
OD_VA2: 20/
OS_ADD: +2.00
OS_VA3: 20/
OD_VA3: 20/
OS_VA1: 20/
OD_VA1: 20/
OD_SPHERE: +0.25
OS_CYLINDER: -1.75
OU_VA: 20/
OS_AXIS: 025

## 2019-10-10 ASSESSMENT — REFRACTION_AUTOREFRACTION
OD_SPHERE: +0.50
OD_CYLINDER: -1.25
OS_SPHERE: +0.25
OS_CYLINDER: -1.75
OD_AXIS: 167
OS_AXIS: 007

## 2019-10-10 ASSESSMENT — VISUAL ACUITY
OS_BCVA: 20/20-1
OD_BCVA: 20/20

## 2019-10-10 ASSESSMENT — SPHEQUIV_DERIVED
OS_SPHEQUIV: -0.625
OD_SPHEQUIV: -0.125
OD_SPHEQUIV: -0.5

## 2019-10-16 ENCOUNTER — TRANSCRIBE ORDERS (OUTPATIENT)
Dept: ADMINISTRATIVE | Facility: HOSPITAL | Age: 49
End: 2019-10-16

## 2019-10-16 ENCOUNTER — DOCTOR'S OFFICE (OUTPATIENT)
Dept: URBAN - NONMETROPOLITAN AREA CLINIC 1 | Facility: CLINIC | Age: 49
Setting detail: OPHTHALMOLOGY
End: 2019-10-16
Payer: COMMERCIAL

## 2019-10-16 DIAGNOSIS — H01.004: ICD-10-CM

## 2019-10-16 DIAGNOSIS — H01.002: ICD-10-CM

## 2019-10-16 DIAGNOSIS — H01.005: ICD-10-CM

## 2019-10-16 DIAGNOSIS — H10.501: ICD-10-CM

## 2019-10-16 DIAGNOSIS — N23 KIDNEY PAIN: Primary | ICD-10-CM

## 2019-10-16 DIAGNOSIS — H01.001: ICD-10-CM

## 2019-10-16 PROCEDURE — 92012 INTRM OPH EXAM EST PATIENT: CPT | Performed by: OPHTHALMOLOGY

## 2019-10-16 ASSESSMENT — LID EXAM ASSESSMENTS
OD_BLEPHARITIS: ABSENT
OS_BLEPHARITIS: LLL LUL T

## 2019-10-16 ASSESSMENT — SUPERFICIAL PUNCTATE KERATITIS (SPK): OD_SPK: ABSENT

## 2019-10-17 ENCOUNTER — HOSPITAL ENCOUNTER (OUTPATIENT)
Dept: ULTRASOUND IMAGING | Facility: HOSPITAL | Age: 49
Discharge: HOME | End: 2019-10-17
Attending: NURSE PRACTITIONER
Payer: COMMERCIAL

## 2019-10-17 ENCOUNTER — HOSPITAL ENCOUNTER (OUTPATIENT)
Dept: ULTRASOUND IMAGING | Facility: HOSPITAL | Age: 49
Discharge: HOME | End: 2019-10-17
Payer: COMMERCIAL

## 2019-10-17 DIAGNOSIS — N92.1 MENORRHAGIA WITH IRREGULAR CYCLE: ICD-10-CM

## 2019-10-17 DIAGNOSIS — N23 KIDNEY PAIN: ICD-10-CM

## 2019-10-17 PROCEDURE — 76770 US EXAM ABDO BACK WALL COMP: CPT

## 2019-10-17 PROCEDURE — 76830 TRANSVAGINAL US NON-OB: CPT

## 2019-10-17 PROCEDURE — 76856 US EXAM PELVIC COMPLETE: CPT

## 2019-10-17 ASSESSMENT — REFRACTION_MANIFEST
OD_VA2: 20/
OD_VA2: 20/
OD_VA3: 20/
OD_ADD: +2.00
OS_VA2: 20/
OD_VA1: 20/20
OD_CYLINDER: -1.50
OS_SPHERE: PL-
OS_AXIS: 025
OS_ADD: +2.00
OU_VA: 20/
OS_CYLINDER: -1.75
OS_VA1: 20/20
OU_VA: 20/
OD_VA1: 20/
OS_VA3: 20/
OD_AXIS: 167
OS_VA3: 20/
OS_VA2: 20/
OD_VA3: 20/
OS_VA1: 20/
OD_SPHERE: +0.25

## 2019-10-17 ASSESSMENT — REFRACTION_CURRENTRX
OD_OVR_VA: 20/
OS_ADD: +1.50
OD_OVR_VA: 20/
OS_OVR_VA: 20/
OS_VPRISM_DIRECTION: PROGS
OS_OVR_VA: 20/
OS_CYLINDER: -2.00
OD_SPHERE: PL
OS_AXIS: 026
OD_OVR_VA: 20/
OD_CYLINDER: -1.50
OS_SPHERE: +0.50
OD_ADD: +1.50
OD_VPRISM_DIRECTION: PROGS
OD_AXIS: 167
OS_OVR_VA: 20/

## 2019-10-17 ASSESSMENT — SPHEQUIV_DERIVED
OS_SPHEQUIV: -0.625
OD_SPHEQUIV: -0.125
OD_SPHEQUIV: -0.5

## 2019-10-17 ASSESSMENT — REFRACTION_AUTOREFRACTION
OD_AXIS: 167
OS_CYLINDER: -1.75
OD_SPHERE: +0.50
OD_CYLINDER: -1.25
OS_AXIS: 007
OS_SPHERE: +0.25

## 2019-10-17 ASSESSMENT — VISUAL ACUITY
OD_BCVA: 20/20
OS_BCVA: 20/20-1

## 2019-10-21 ENCOUNTER — TELEPHONE (OUTPATIENT)
Dept: OBGYN CLINIC | Facility: CLINIC | Age: 49
End: 2019-10-21

## 2019-10-21 DIAGNOSIS — N83.209 CYST OF OVARY, UNSPECIFIED LATERALITY: Primary | ICD-10-CM

## 2019-10-21 NOTE — TELEPHONE ENCOUNTER
51 yo with menorrhagia / irregular menses: pelvic US  Shows normal sized uterus, EML of 4 mm, small internal hypoechoic area of 5 mm  Ovarian cyst measuring 4 2 cm -- noted under right ovary but dictated as on left  Call in to radiology to confirm  Left ovary normal     Discussed pelvic US findings with patient in detail  With her history suggested endometrial sampling-- states she has had them in past and were extremely painful  She would like to defer Ebx  Explained that management of her bleeding may require sampling prior to treatment, ie, with EMA  She would like to repeat US in 6 wks for follow up of ovarian cyst and meet with Dr Yandy Paige 2w after to discuss findings and further management  Pelvic US order placed  She will make appt and call back to make appt with Dr Yandy Paige

## 2019-10-22 ENCOUNTER — TELEPHONE (OUTPATIENT)
Dept: OBGYN CLINIC | Facility: CLINIC | Age: 49
End: 2019-10-22

## 2019-11-19 ENCOUNTER — OFFICE VISIT (OUTPATIENT)
Dept: OBGYN CLINIC | Facility: CLINIC | Age: 49
End: 2019-11-19
Payer: COMMERCIAL

## 2019-11-19 VITALS
SYSTOLIC BLOOD PRESSURE: 144 MMHG | BODY MASS INDEX: 24.63 KG/M2 | WEIGHT: 139 LBS | DIASTOLIC BLOOD PRESSURE: 80 MMHG | HEIGHT: 63 IN

## 2019-11-19 DIAGNOSIS — N92.6 IRREGULAR MENSES: Primary | ICD-10-CM

## 2019-11-19 DIAGNOSIS — N83.202 CYST OF LEFT OVARY: ICD-10-CM

## 2019-11-19 PROCEDURE — 99214 OFFICE O/P EST MOD 30 MIN: CPT | Performed by: OBSTETRICS & GYNECOLOGY

## 2019-11-19 RX ORDER — NITROFURANTOIN MACROCRYSTALS 50 MG/1
CAPSULE ORAL
Refills: 11 | COMMUNITY
Start: 2019-11-07 | End: 2020-05-17 | Stop reason: ALTCHOICE

## 2019-11-19 NOTE — PROGRESS NOTES
Assessment/Plan:  Will obtain CBC with diff, ferritin and iron  She will have follow-up pelvic ultrasound after her next menstrual cycle, follow-up ovarian cyst   Reviewed brian menopausal symptoms  Patient will return to office after above tests completed for further discussion  No problem-specific Assessment & Plan notes found for this encounter  Diagnoses and all orders for this visit:    Irregular menses  -     CBC and differential  -     Ferritin  -     Iron    Cyst of left ovary  -     US pelvis complete w transvaginal; Future    Other orders  -     nitrofurantoin (MACRODANTIN) 50 mg capsule; ONE TABLET AFTER INTERCOURSE          Subjective:      Patient ID: Janett Guidry is a 50 y o  female  HPI     This is a 27-year-old female  ( x2, age 21, 21) presents as a new patient to our office complaining of irregular menses  Patient states her menstrual cycles were normal every 28 days up until last year  She had skip 2 months consecutively the beginning of the year followed by menstrual cycles approximately every 4 weeks lasting anywhere from 5-10 days described as heavy for 2-3 days  She denies any pelvic pain  There has been no changes in bowel or bladder function  She has been in a monogamous relationship for 27 years  Her method of contraception has been vasectomy  Patient denies any hot flashes or night sweats  She underwent a pelvic ultrasound last month which had revealed small complex left ovarian cyst measuring 4 x 2 5 cm  This ultrasound was done prior to her menstrual cycle  Recommendations were to have repeat ultrasound in 6 weeks  Patient's gyn history was significant for infertility  After extensive workup she ultimately conceived both pregnancy spontaneously  She does have a history of anxiety      The following portions of the patient's history were reviewed and updated as appropriate: allergies, current medications, past family history, past medical history, past social history, past surgical history and problem list     Review of Systems   Constitutional: Negative for fatigue, fever and unexpected weight change  Respiratory: Negative for cough, chest tightness, shortness of breath and wheezing  Cardiovascular: Negative  Negative for chest pain and palpitations  Gastrointestinal: Negative  Negative for abdominal distention, abdominal pain, blood in stool, constipation, diarrhea, nausea and vomiting  Genitourinary: Positive for menstrual problem  Negative for difficulty urinating, dyspareunia, dysuria, flank pain, frequency, genital sores, hematuria, pelvic pain, urgency, vaginal bleeding, vaginal discharge and vaginal pain  Skin: Negative for rash  Objective:      /80   Ht 5' 3" (1 6 m)   Wt 63 kg (139 lb)   LMP 11/14/2019 (Exact Date)   Breastfeeding? No   BMI 24 62 kg/m²          Physical Exam   Constitutional: She is oriented to person, place, and time  She appears well-developed and well-nourished  Abdominal: Soft  Bowel sounds are normal    Genitourinary: Vagina normal and uterus normal  Cervix exhibits no discharge and no friability  Right adnexum displays no mass and no tenderness  Left adnexum displays no mass and no tenderness  Genitourinary Comments: Patient is menstruating today  Neurological: She is alert and oriented to person, place, and time

## 2019-12-12 ENCOUNTER — HOSPITAL ENCOUNTER (OUTPATIENT)
Dept: MAMMOGRAPHY | Facility: MEDICAL CENTER | Age: 49
Discharge: HOME | End: 2019-12-12
Payer: COMMERCIAL

## 2019-12-12 ENCOUNTER — HOSPITAL ENCOUNTER (OUTPATIENT)
Dept: ULTRASOUND IMAGING | Facility: MEDICAL CENTER | Age: 49
Discharge: HOME | End: 2019-12-12
Payer: COMMERCIAL

## 2019-12-12 VITALS — HEIGHT: 63 IN | WEIGHT: 135 LBS | BODY MASS INDEX: 23.92 KG/M2

## 2019-12-12 DIAGNOSIS — Z12.31 ENCOUNTER FOR SCREENING MAMMOGRAM FOR BREAST CANCER: ICD-10-CM

## 2019-12-12 DIAGNOSIS — N83.202 CYST OF LEFT OVARY: ICD-10-CM

## 2019-12-12 PROCEDURE — 77067 SCR MAMMO BI INCL CAD: CPT

## 2019-12-12 PROCEDURE — 77063 BREAST TOMOSYNTHESIS BI: CPT

## 2019-12-12 PROCEDURE — 76856 US EXAM PELVIC COMPLETE: CPT

## 2019-12-12 PROCEDURE — 76830 TRANSVAGINAL US NON-OB: CPT

## 2019-12-17 ENCOUNTER — TELEPHONE (OUTPATIENT)
Dept: OBGYN CLINIC | Facility: CLINIC | Age: 49
End: 2019-12-17

## 2019-12-17 DIAGNOSIS — N83.202 CYST OF LEFT OVARY: Primary | ICD-10-CM

## 2019-12-17 NOTE — TELEPHONE ENCOUNTER
Pt informed of CBC results & recom to start FE supp BID  Pelvic U/S results pending  Will recall wwith results  Pt has f/u appt sched for 1/7/2020

## 2019-12-18 NOTE — TELEPHONE ENCOUNTER
Pt informed pelvic U/S results & recom to have repeat U/S in 2 months f/u (L) ovarian cyst   Rad order in pt's chart    Pt to keep f/u appt as scheduled/KA

## 2019-12-18 NOTE — TELEPHONE ENCOUNTER
Inform patient pelvic ultrasound reveals cyst slightly smaller, recommend repeat ultrasound 2 months  I was going to discuss these results with her as well as treatment options for menorrhagia on her follow-up visit

## 2020-01-07 ENCOUNTER — OFFICE VISIT (OUTPATIENT)
Dept: OBGYN CLINIC | Facility: CLINIC | Age: 50
End: 2020-01-07
Payer: COMMERCIAL

## 2020-01-07 VITALS
DIASTOLIC BLOOD PRESSURE: 82 MMHG | SYSTOLIC BLOOD PRESSURE: 138 MMHG | BODY MASS INDEX: 24.56 KG/M2 | WEIGHT: 138.6 LBS | HEIGHT: 63 IN

## 2020-01-07 DIAGNOSIS — N83.209 CYST OF OVARY, UNSPECIFIED LATERALITY: ICD-10-CM

## 2020-01-07 DIAGNOSIS — N92.0 MENORRHAGIA WITH REGULAR CYCLE: ICD-10-CM

## 2020-01-07 DIAGNOSIS — D50.9 IRON DEFICIENCY ANEMIA, UNSPECIFIED IRON DEFICIENCY ANEMIA TYPE: ICD-10-CM

## 2020-01-07 DIAGNOSIS — N92.6 IRREGULAR MENSES: Primary | ICD-10-CM

## 2020-01-07 PROCEDURE — 99213 OFFICE O/P EST LOW 20 MIN: CPT | Performed by: OBSTETRICS & GYNECOLOGY

## 2020-01-07 RX ORDER — TRANEXAMIC ACID 650 1/1
2 TABLET ORAL 3 TIMES DAILY
Qty: 30 TABLET | Refills: 0 | Status: SHIPPED | OUTPATIENT
Start: 2020-01-07 | End: 2020-05-17

## 2020-01-07 NOTE — PROGRESS NOTES
Assessment/Plan:  Recommend repeat a CBC 6 weeks from last blood test   She will also have a repeat pelvic ultrasound 8 weeks follow-up ovarian cyst   We discussed treatment options for menorrhagia including low-dose OCPs, lysteda  She is also inquiring about endometrial ablation  Risks and benefits reviewed  All questions answered  She is aware, if she would like to proceed with ablation I would recommend endometrial biopsy  At this point with the next menstrual cycle she will try Lysteda x 5 days  I will notify her the above results via telephone  Patient will update me at that point  All questions answered  No problem-specific Assessment & Plan notes found for this encounter  Diagnoses and all orders for this visit:    Irregular menses    Iron deficiency anemia, unspecified iron deficiency anemia type  -     CBC and differential; Future  -     CBC and differential    Cyst of ovary, unspecified laterality  -     US pelvis complete w transvaginal; Future    Menorrhagia with regular cycle  -     Tranexamic Acid 650 MG TABS; Take 2 tablets by mouth 3 (three) times a day          Subjective:      Patient ID: Janneth Hays is a 52 y o  female  HPI     This is a 42-year-old female  ( x2, age 21, 21) presents for follow-up menorrhagia, iron deficiency anemia  In the course of the year, she has skipped 2 consecutive months  Cycles are usually every 4 weeks lasting anywhere from 5-10 days described as heavy for 2-3 days  Last menstrual cycles , 12/7 x7 days  Hemoglobin was noted to be 9 5  She did start iron supplements  She does feel significantly better with increased energy  Pelvic ultrasound had revealed persistent small ovarian cyst, decreased in size with recommendations follow-up in 8 weeks  She does get intermittent hot flashes  Patient's family history significant for paternal aunt with ovarian cancer at age 54, stage II, another paternal aunt breast cancer    Genetic testing was negative  Patient also went through genetic testing, which was negative  The following portions of the patient's history were reviewed and updated as appropriate: allergies, current medications, past family history, past medical history, past social history, past surgical history and problem list     Review of Systems   Constitutional: Negative for fatigue, fever and unexpected weight change  Respiratory: Negative for cough, chest tightness, shortness of breath and wheezing  Cardiovascular: Negative  Negative for chest pain and palpitations  Gastrointestinal: Negative  Negative for abdominal distention, abdominal pain, blood in stool, constipation, diarrhea, nausea and vomiting  Genitourinary: Positive for menstrual problem  Negative for difficulty urinating, dyspareunia, dysuria, flank pain, frequency, genital sores, hematuria, pelvic pain, urgency, vaginal bleeding, vaginal discharge and vaginal pain  Skin: Negative for rash  Objective:      /82   Ht 5' 3" (1 6 m)   Wt 62 9 kg (138 lb 9 6 oz)   LMP 12/07/2019 (Exact Date)   Breastfeeding? No   BMI 24 55 kg/m²          Physical Exam      I spent over 20 minutes with patient of which greater than 50% was spent discussing results and treatment options

## 2020-02-05 ENCOUNTER — TELEPHONE (OUTPATIENT)
Dept: OBGYN CLINIC | Facility: CLINIC | Age: 50
End: 2020-02-05

## 2020-02-05 NOTE — TELEPHONE ENCOUNTER
Pt had repeat CBC 1/24/2020 - under Care Everywhere documents 2/5/2020 - clinical summary  Hgb/Hct = 12 0/39 5 (prev 9 5/32 1 on 12/11/2019  Pt is presently taking FE supp once daily - should she continue?

## 2020-02-05 NOTE — TELEPHONE ENCOUNTER
Inform patient CBC much improved  Discussed treatment options for menorrhagia  She was going to use lysteda  Please let me know if she wants to continue regimen

## 2020-02-05 NOTE — TELEPHONE ENCOUNTER
Pt aware of lab results  She will use Lysteda this cycle & recall office to update on bleeding flow

## 2020-02-14 ENCOUNTER — DOCTOR'S OFFICE (OUTPATIENT)
Dept: URBAN - NONMETROPOLITAN AREA CLINIC 1 | Facility: CLINIC | Age: 50
Setting detail: OPHTHALMOLOGY
End: 2020-02-14
Payer: COMMERCIAL

## 2020-02-14 VITALS — HEIGHT: 55 IN

## 2020-02-14 DIAGNOSIS — H25.13: ICD-10-CM

## 2020-02-14 DIAGNOSIS — H25.11: ICD-10-CM

## 2020-02-14 DIAGNOSIS — H52.4: ICD-10-CM

## 2020-02-14 DIAGNOSIS — H01.002: ICD-10-CM

## 2020-02-14 DIAGNOSIS — H52.223: ICD-10-CM

## 2020-02-14 DIAGNOSIS — H01.004: ICD-10-CM

## 2020-02-14 DIAGNOSIS — H40.013: ICD-10-CM

## 2020-02-14 DIAGNOSIS — H01.001: ICD-10-CM

## 2020-02-14 DIAGNOSIS — H25.12: ICD-10-CM

## 2020-02-14 DIAGNOSIS — H40.033: ICD-10-CM

## 2020-02-14 DIAGNOSIS — H01.005: ICD-10-CM

## 2020-02-14 PROBLEM — H10.501 BLEPHAROCONJUNCTIVITS NOS; RIGHT EYE: Status: RESOLVED | Noted: 2019-10-09 | Resolved: 2020-02-14

## 2020-02-14 PROBLEM — H40.032 NARROW ANGLE; RIGHT EYE, LEFT EYE, BOTH EYES: Status: RESOLVED | Noted: 2019-10-09 | Resolved: 2020-02-14

## 2020-02-14 PROBLEM — H40.031 NARROW ANGLE; RIGHT EYE, LEFT EYE, BOTH EYES: Status: RESOLVED | Noted: 2019-10-09 | Resolved: 2020-02-14

## 2020-02-14 PROCEDURE — 92015 DETERMINE REFRACTIVE STATE: CPT | Performed by: OPTOMETRIST

## 2020-02-14 PROCEDURE — 92014 COMPRE OPH EXAM EST PT 1/>: CPT | Performed by: OPTOMETRIST

## 2020-02-14 PROCEDURE — 92083 EXTENDED VISUAL FIELD XM: CPT | Performed by: OPTOMETRIST

## 2020-02-14 ASSESSMENT — REFRACTION_AUTOREFRACTION
OS_CYLINDER: -1.50
OD_AXIS: 173
OD_SPHERE: 0.00
OD_CYLINDER: -1.00
OS_AXIS: 15
OS_SPHERE: 0.00

## 2020-02-14 ASSESSMENT — REFRACTION_MANIFEST
OD_CYLINDER: -1.25
OS_AXIS: 015
OS_VA2: 20/20
OD_ADD: +2.25
OD_SPHERE: PLANO
OS_ADD: +2.25
OS_CYLINDER: -1.50
OD_VA2: 20/20
OS_VA1: 20/20
OD_AXIS: 175
OS_SPHERE: PLANO
OD_VA1: 20/20

## 2020-02-14 ASSESSMENT — REFRACTION_CURRENTRX
OD_OVR_VA: 20/
OS_CYLINDER: -1.50
OD_ADD: +1.50
OD_CYLINDER: -1.25
OD_SPHERE: +0.50
OS_SPHERE: PLANO
OS_AXIS: 23
OS_OVR_VA: 20/
OS_ADD: +1.75
OD_VPRISM_DIRECTION: PROGS
OS_VPRISM_DIRECTION: PROGS
OD_AXIS: 174

## 2020-02-14 ASSESSMENT — LID EXAM ASSESSMENTS
OS_BLEPHARITIS: T
OD_BLEPHARITIS: T

## 2020-02-14 ASSESSMENT — KERATOMETRY
OD_K1POWER_DIOPTERS: 43.50
OD_AXISANGLE_DEGREES: 171
OD_K2POWER_DIOPTERS: 44.25
OS_K1POWER_DIOPTERS: 43.00
OS_AXISANGLE_DEGREES: 011
OS_K2POWER_DIOPTERS: 44.75

## 2020-02-14 ASSESSMENT — SUPERFICIAL PUNCTATE KERATITIS (SPK): OD_SPK: ABSENT

## 2020-02-14 ASSESSMENT — CONFRONTATIONAL VISUAL FIELD TEST (CVF)
OD_FINDINGS: FULL
OS_FINDINGS: FULL

## 2020-02-14 ASSESSMENT — SPHEQUIV_DERIVED
OS_SPHEQUIV: -0.75
OD_SPHEQUIV: -0.5

## 2020-02-14 ASSESSMENT — AXIALLENGTH_DERIVED
OD_AL: 23.6491
OS_AL: 23.7474

## 2020-02-14 ASSESSMENT — VISUAL ACUITY
OS_BCVA: 20/20+1
OD_BCVA: 20/20

## 2020-02-27 ENCOUNTER — HOSPITAL ENCOUNTER (OUTPATIENT)
Dept: ULTRASOUND IMAGING | Facility: HOSPITAL | Age: 50
Discharge: HOME | End: 2020-02-27
Payer: COMMERCIAL

## 2020-02-27 DIAGNOSIS — N83.209 CYST OF OVARY, UNSPECIFIED LATERALITY: ICD-10-CM

## 2020-02-27 PROCEDURE — 76830 TRANSVAGINAL US NON-OB: CPT

## 2020-02-27 PROCEDURE — 76856 US EXAM PELVIC COMPLETE: CPT

## 2020-03-02 ENCOUNTER — TELEPHONE (OUTPATIENT)
Dept: OBGYN CLINIC | Facility: CLINIC | Age: 50
End: 2020-03-02

## 2020-03-02 NOTE — TELEPHONE ENCOUNTER
----- Message from Margot Oppenheim, DO sent at 3/1/2020  9:06 PM EST -----  Inform pt US reveals ovarian cyst resolve

## 2020-03-06 ENCOUNTER — DOCTOR'S OFFICE (OUTPATIENT)
Dept: URBAN - NONMETROPOLITAN AREA CLINIC 1 | Facility: CLINIC | Age: 50
Setting detail: OPHTHALMOLOGY
End: 2020-03-06

## 2020-03-06 DIAGNOSIS — H52.223: ICD-10-CM

## 2020-03-06 PROCEDURE — CLFUP CONTACT LENS FOLLOW-UP: Performed by: OPTOMETRIST

## 2020-03-06 ASSESSMENT — CONFRONTATIONAL VISUAL FIELD TEST (CVF)
OD_FINDINGS: FULL
OS_FINDINGS: FULL

## 2020-03-06 ASSESSMENT — LID EXAM ASSESSMENTS
OD_BLEPHARITIS: T
OS_BLEPHARITIS: T

## 2020-03-06 ASSESSMENT — SUPERFICIAL PUNCTATE KERATITIS (SPK): OD_SPK: ABSENT

## 2020-03-10 ASSESSMENT — REFRACTION_MANIFEST
OS_VA1: 20/20
OD_AXIS: 175
OS_CYLINDER: -1.50
OD_VA1: 20/20
OS_SPHERE: PLANO
OD_ADD: +2.25
OD_CYLINDER: -1.25
OS_AXIS: 015
OD_VA2: 20/20
OS_VA2: 20/20
OD_SPHERE: PLANO
OS_ADD: +2.25

## 2020-03-10 ASSESSMENT — KERATOMETRY
OS_K2POWER_DIOPTERS: 44.75
OS_K1POWER_DIOPTERS: 43.00
OD_K1POWER_DIOPTERS: 43.50
OD_AXISANGLE_DEGREES: 171
OD_K2POWER_DIOPTERS: 44.25
OS_AXISANGLE_DEGREES: 011

## 2020-03-10 ASSESSMENT — REFRACTION_CURRENTRX
OD_CYLINDER: -1.25
OS_CYLINDER: -1.50
OS_ADD: +1.75
OS_OVR_VA: 20/
OD_SPHERE: +0.50
OD_ADD: +1.50
OS_SPHERE: PLANO
OD_VPRISM_DIRECTION: PROGS
OD_OVR_VA: 20/
OS_AXIS: 23
OD_AXIS: 174
OS_VPRISM_DIRECTION: PROGS

## 2020-03-10 ASSESSMENT — REFRACTION_AUTOREFRACTION
OS_AXIS: 15
OD_SPHERE: 0.00
OD_AXIS: 173
OS_SPHERE: 0.00
OD_CYLINDER: -1.00
OS_CYLINDER: -1.50

## 2020-03-10 ASSESSMENT — SPHEQUIV_DERIVED
OD_SPHEQUIV: -0.5
OS_SPHEQUIV: -0.75

## 2020-03-10 ASSESSMENT — VISUAL ACUITY
OD_BCVA: 20/30-2
OS_BCVA: 20/25+2

## 2020-03-10 ASSESSMENT — AXIALLENGTH_DERIVED
OS_AL: 23.7474
OD_AL: 23.6491

## 2020-05-17 ENCOUNTER — APPOINTMENT (EMERGENCY)
Dept: RADIOLOGY | Facility: HOSPITAL | Age: 50
End: 2020-05-17
Payer: COMMERCIAL

## 2020-05-17 ENCOUNTER — HOSPITAL ENCOUNTER (EMERGENCY)
Facility: HOSPITAL | Age: 50
Discharge: HOME | End: 2020-05-18
Attending: EMERGENCY MEDICINE | Admitting: EMERGENCY MEDICINE
Payer: COMMERCIAL

## 2020-05-17 ENCOUNTER — APPOINTMENT (EMERGENCY)
Dept: CT IMAGING | Facility: HOSPITAL | Age: 50
End: 2020-05-17
Payer: COMMERCIAL

## 2020-05-17 VITALS
OXYGEN SATURATION: 100 % | DIASTOLIC BLOOD PRESSURE: 88 MMHG | SYSTOLIC BLOOD PRESSURE: 158 MMHG | RESPIRATION RATE: 19 BRPM | TEMPERATURE: 98.2 F | HEART RATE: 76 BPM | WEIGHT: 148.15 LBS | BODY MASS INDEX: 26.24 KG/M2

## 2020-05-17 DIAGNOSIS — J18.9 PNEUMONIA: ICD-10-CM

## 2020-05-17 DIAGNOSIS — R07.89 ATYPICAL CHEST PAIN: Primary | ICD-10-CM

## 2020-05-17 LAB
ALBUMIN SERPL BCP-MCNC: 3.2 G/DL (ref 3.5–5)
ALP SERPL-CCNC: 93 U/L (ref 46–116)
ALT SERPL W P-5'-P-CCNC: 19 U/L (ref 12–78)
ANION GAP SERPL CALCULATED.3IONS-SCNC: 7 MMOL/L (ref 4–13)
AST SERPL W P-5'-P-CCNC: 46 U/L (ref 5–45)
BASOPHILS # BLD AUTO: 0.06 THOUSANDS/ΜL (ref 0–0.1)
BASOPHILS NFR BLD AUTO: 0 % (ref 0–1)
BILIRUB SERPL-MCNC: 0.4 MG/DL (ref 0.2–1)
BUN SERPL-MCNC: 8 MG/DL (ref 5–25)
CALCIUM SERPL-MCNC: 8.3 MG/DL (ref 8.3–10.1)
CHLORIDE SERPL-SCNC: 101 MMOL/L (ref 100–108)
CO2 SERPL-SCNC: 27 MMOL/L (ref 21–32)
CREAT SERPL-MCNC: 0.67 MG/DL (ref 0.6–1.3)
D DIMER PPP FEU-MCNC: 0.41 UG/ML FEU
EOSINOPHIL # BLD AUTO: 0.31 THOUSAND/ΜL (ref 0–0.61)
EOSINOPHIL NFR BLD AUTO: 2 % (ref 0–6)
ERYTHROCYTE [DISTWIDTH] IN BLOOD BY AUTOMATED COUNT: 14 % (ref 11.6–15.1)
GFR SERPL CREATININE-BSD FRML MDRD: 104 ML/MIN/1.73SQ M
GLUCOSE SERPL-MCNC: 89 MG/DL (ref 65–140)
HCT VFR BLD AUTO: 36.8 % (ref 34.8–46.1)
HGB BLD-MCNC: 11.8 G/DL (ref 11.5–15.4)
IMM GRANULOCYTES # BLD AUTO: 0.06 THOUSAND/UL (ref 0–0.2)
IMM GRANULOCYTES NFR BLD AUTO: 0 % (ref 0–2)
LIPASE SERPL-CCNC: 59 U/L (ref 73–393)
LYMPHOCYTES # BLD AUTO: 3.7 THOUSANDS/ΜL (ref 0.6–4.47)
LYMPHOCYTES NFR BLD AUTO: 25 % (ref 14–44)
MCH RBC QN AUTO: 28.7 PG (ref 26.8–34.3)
MCHC RBC AUTO-ENTMCNC: 32.1 G/DL (ref 31.4–37.4)
MCV RBC AUTO: 90 FL (ref 82–98)
MONOCYTES # BLD AUTO: 1.46 THOUSAND/ΜL (ref 0.17–1.22)
MONOCYTES NFR BLD AUTO: 10 % (ref 4–12)
NEUTROPHILS # BLD AUTO: 9.2 THOUSANDS/ΜL (ref 1.85–7.62)
NEUTS SEG NFR BLD AUTO: 63 % (ref 43–75)
NRBC BLD AUTO-RTO: 0 /100 WBCS
PLATELET # BLD AUTO: 372 THOUSANDS/UL (ref 149–390)
PMV BLD AUTO: 10.1 FL (ref 8.9–12.7)
POTASSIUM SERPL-SCNC: 5.7 MMOL/L (ref 3.5–5.3)
PROT SERPL-MCNC: 8 G/DL (ref 6.4–8.2)
RBC # BLD AUTO: 4.11 MILLION/UL (ref 3.81–5.12)
SODIUM SERPL-SCNC: 135 MMOL/L (ref 136–145)
TROPONIN I SERPL-MCNC: <0.02 NG/ML
WBC # BLD AUTO: 14.79 THOUSAND/UL (ref 4.31–10.16)

## 2020-05-17 PROCEDURE — 80053 COMPREHEN METABOLIC PANEL: CPT | Performed by: EMERGENCY MEDICINE

## 2020-05-17 PROCEDURE — 93005 ELECTROCARDIOGRAM TRACING: CPT

## 2020-05-17 PROCEDURE — 85025 COMPLETE CBC W/AUTO DIFF WBC: CPT | Performed by: EMERGENCY MEDICINE

## 2020-05-17 PROCEDURE — 96374 THER/PROPH/DIAG INJ IV PUSH: CPT

## 2020-05-17 PROCEDURE — 85379 FIBRIN DEGRADATION QUANT: CPT | Performed by: EMERGENCY MEDICINE

## 2020-05-17 PROCEDURE — 36415 COLL VENOUS BLD VENIPUNCTURE: CPT | Performed by: EMERGENCY MEDICINE

## 2020-05-17 PROCEDURE — 83690 ASSAY OF LIPASE: CPT | Performed by: EMERGENCY MEDICINE

## 2020-05-17 PROCEDURE — 71260 CT THORAX DX C+: CPT

## 2020-05-17 PROCEDURE — 71045 X-RAY EXAM CHEST 1 VIEW: CPT

## 2020-05-17 PROCEDURE — 84484 ASSAY OF TROPONIN QUANT: CPT | Performed by: EMERGENCY MEDICINE

## 2020-05-17 PROCEDURE — 99285 EMERGENCY DEPT VISIT HI MDM: CPT

## 2020-05-17 PROCEDURE — 96360 HYDRATION IV INFUSION INIT: CPT

## 2020-05-17 PROCEDURE — 99285 EMERGENCY DEPT VISIT HI MDM: CPT | Performed by: EMERGENCY MEDICINE

## 2020-05-17 RX ORDER — KETOROLAC TROMETHAMINE 30 MG/ML
15 INJECTION, SOLUTION INTRAMUSCULAR; INTRAVENOUS ONCE
Status: COMPLETED | OUTPATIENT
Start: 2020-05-17 | End: 2020-05-17

## 2020-05-17 RX ORDER — AZITHROMYCIN 250 MG/1
TABLET, FILM COATED ORAL
Qty: 4 TABLET | Refills: 0 | Status: SHIPPED | OUTPATIENT
Start: 2020-05-18 | End: 2020-05-21

## 2020-05-17 RX ORDER — AZITHROMYCIN 250 MG/1
500 TABLET, FILM COATED ORAL ONCE
Status: COMPLETED | OUTPATIENT
Start: 2020-05-18 | End: 2020-05-17

## 2020-05-17 RX ADMIN — KETOROLAC TROMETHAMINE 15 MG: 30 INJECTION, SOLUTION INTRAMUSCULAR at 21:20

## 2020-05-17 RX ADMIN — SODIUM CHLORIDE 1000 ML: 0.9 INJECTION, SOLUTION INTRAVENOUS at 22:37

## 2020-05-17 RX ADMIN — IOHEXOL 85 ML: 350 INJECTION, SOLUTION INTRAVENOUS at 22:54

## 2020-05-17 RX ADMIN — AZITHROMYCIN 500 MG: 250 TABLET, FILM COATED ORAL at 23:56

## 2020-05-18 LAB
ATRIAL RATE: 93 BPM
P AXIS: 52 DEGREES
PR INTERVAL: 122 MS
QRS AXIS: 87 DEGREES
QRSD INTERVAL: 88 MS
QT INTERVAL: 362 MS
QTC INTERVAL: 450 MS
T WAVE AXIS: 48 DEGREES
VENTRICULAR RATE: 93 BPM

## 2021-03-03 ENCOUNTER — TELEPHONE (OUTPATIENT)
Dept: OBGYN CLINIC | Facility: CLINIC | Age: 51
End: 2021-03-03

## 2021-03-03 DIAGNOSIS — Z12.31 ENCOUNTER FOR SCREENING MAMMOGRAM FOR MALIGNANT NEOPLASM OF BREAST: Primary | ICD-10-CM

## 2021-03-03 NOTE — TELEPHONE ENCOUNTER
PD HPI HEAD INJURY





- Stated complaint


Stated Complaint: HEAD LAC





- Chief complaint


Chief Complaint: Trauma Hd/Nk





- History obtained from


History obtained from: Patient





- History of Present Illness


Mechanism of head injury: Fell (playing with brother and fell, striking back of 

head on furniture.)


Where head injury occurred: Home


Timing - onset: How many minutes ago (30), Today


Location of injury: Back


Associated symptoms: No: LOC, AMS, Nausea / vomiting


Symptoms worsen with: Palpation


Similar symptoms before: Has not had sx before


Recently seen: Not recently seen





Review of Systems


Constitutional: denies: Fever


Nose: denies: Rhinorrhea / runny nose, Congestion


Throat: denies: Sore throat


Respiratory: denies: Cough


GI: denies: Nausea, Vomiting


Skin: reports: Laceration (s) (back of head)


Musculoskeletal: denies: Neck pain, Back pain





PD PAST MEDICAL HISTORY





- Past Medical History


Cardiovascular: None


Respiratory: None


Neuro: None





- Past Surgical History


Past Surgical History: No





- Present Medications


Home Medications: 


                                Ambulatory Orders











 Medication  Instructions  Recorded  Confirmed


 


Cephalexin Suspension [Keflex] 5 ml PO QID 10 Days #200 ml 10/20/18 


 


Mupirocin Calcium [Mupirocin] 2 gm TP TID #2 cream..g. 10/20/18 














- Allergies


Allergies/Adverse Reactions: 


                                    Allergies











Allergy/AdvReac Type Severity Reaction Status Date / Time


 


No Known Drug Allergies Allergy   Verified 08/08/19 20:43














- Social History


Does the pt smoke?: No


Smoking Status: Never smoker


Does the pt drink ETOH?: No


Does the pt have substance abuse?: No





- Immunizations


Immunizations are current?: Yes


Immunizations: TDAP current <10years, Other immun current





PD ED PE NORMAL





- Vitals


Vital signs reviewed: Yes





- General


General: Alert and oriented X 3, No acute distress, Well developed/nourished





- Neck


Neck: Supple, no meningeal sign, No adenopathy





- Cardiac


Cardiac: RRR





- Derm


Derm: Normal color, Warm and dry





- Extremities


Extremities: Normal ROM s pain





- Neuro


Neuro: Alert and oriented X 3 (normal for age), No motor deficit, Normal speech





Results





- Vitals


Vitals: 


                               Vital Signs - 24 hr











  08/08/19 08/08/19





  20:35 23:34


 


Temperature 36.6 C 


 


Heart Rate 96 100


 


Respiratory 22 19





Rate  


 


O2 Saturation 97 100








                                     Oxygen











O2 Source                      Room air

















Procedures





- Laceration (location)


  ** occiput


Length in cm: 1


Wound type: Linear, Into subcut fat


Anesthesia: LET


Wound Preparation: Irrigated copiously NS


Skin layer closure: Nylon, Interrupted, Size #-0 - enter number (4), Sutures - 

enter # (2)


Other: Patient tolerated well, No complications, Neurovascular intact, Tetanus 

UTD


Complexity: Simple





Departure





- Departure


Disposition: 01 Home, Self Care


Clinical Impression: 


Occipital scalp laceration


Qualifiers:


 Encounter type: initial encounter Qualified Code(s): S01.01XA - Laceration 

without foreign body of scalp, initial encounter





Accidental fall


Qualifiers:


 Encounter type: initial encounter Qualified Code(s): W19.XXXA - Unspecified 

fall, initial encounter





Condition: Stable


Record reviewed to determine appropriate education?: Yes


Instructions:  ED Laceration Scalp Sutr Stap Ch


Follow-Up: 


Ann Smith MD [Primary Care Provider] - 


Comments: 


It is okay to wash and shower. Clean off the wound twice a day with soap and 

water, or peroxide and water. Apply some antibiotic ointment to it to keep it 

moist. Also to watch for signs of infection such as purulence, redness or 

increasing pain. Return to your primary care or the ER at the specified time for

suture removal.





Suture removal 8 to 10 days.





Tylenol or ibuprofen if needed for pains.


Discharge Date/Time: 08/08/19 23:45 Pt req order for screening mgram - she will schedule appt @ [facility]  Yearly appt scheduled

## 2021-03-03 NOTE — TELEPHONE ENCOUNTER
----- Message from Bharti Augustin sent at 3/2/2021  4:55 PM EST -----  Regarding: Non-Urgent Medical Question  Contact: [phone]  Can I get a prescription for my yearly mammogram? I would like to try to schedule at the new location right near my house  Thank you!   Ольга Horn

## 2021-03-08 ENCOUNTER — HOSPITAL ENCOUNTER (OUTPATIENT)
Dept: RADIOLOGY | Facility: CLINIC | Age: 51
Discharge: HOME | End: 2021-03-08
Payer: COMMERCIAL

## 2021-03-08 VITALS — WEIGHT: 148 LBS | HEIGHT: 63 IN | BODY MASS INDEX: 26.22 KG/M2

## 2021-03-08 DIAGNOSIS — Z12.31 ENCOUNTER FOR SCREENING MAMMOGRAM FOR MALIGNANT NEOPLASM OF BREAST: ICD-10-CM

## 2021-03-08 PROCEDURE — 77063 BREAST TOMOSYNTHESIS BI: CPT

## 2021-03-08 PROCEDURE — 77067 SCR MAMMO BI INCL CAD: CPT

## 2021-03-31 DIAGNOSIS — Z23 ENCOUNTER FOR IMMUNIZATION: ICD-10-CM

## 2021-05-06 ENCOUNTER — ANNUAL EXAM (OUTPATIENT)
Dept: OBGYN CLINIC | Facility: CLINIC | Age: 51
End: 2021-05-06
Payer: COMMERCIAL

## 2021-05-06 VITALS
DIASTOLIC BLOOD PRESSURE: 84 MMHG | WEIGHT: 145 LBS | SYSTOLIC BLOOD PRESSURE: 132 MMHG | BODY MASS INDEX: 25.69 KG/M2 | HEIGHT: 63 IN

## 2021-05-06 DIAGNOSIS — Z01.419 ENCOUNTER FOR ANNUAL ROUTINE GYNECOLOGICAL EXAMINATION: Primary | ICD-10-CM

## 2021-05-06 DIAGNOSIS — Z12.31 ENCOUNTER FOR SCREENING MAMMOGRAM FOR BREAST CANCER: ICD-10-CM

## 2021-05-06 DIAGNOSIS — N92.0 MENORRHAGIA WITH REGULAR CYCLE: ICD-10-CM

## 2021-05-06 PROCEDURE — 99396 PREV VISIT EST AGE 40-64: CPT | Performed by: OBSTETRICS & GYNECOLOGY

## 2021-05-06 PROCEDURE — G0145 SCR C/V CYTO,THINLAYER,RESCR: HCPCS | Performed by: OBSTETRICS & GYNECOLOGY

## 2021-05-06 PROCEDURE — 87624 HPV HI-RISK TYP POOLED RSLT: CPT | Performed by: OBSTETRICS & GYNECOLOGY

## 2021-05-06 RX ORDER — CETIRIZINE HYDROCHLORIDE 10 MG/1
10 TABLET ORAL
COMMUNITY
Start: 2021-04-27

## 2021-05-06 NOTE — PROGRESS NOTES
Assessment/Plan:  Pap smear done as well as annual   Encouraged self-breast examination as well as calcium supplementation  Continue annual mammogram   Reviewed colon cancer screening  She does plan on seeing a gastroenterologist in the near future for stomach problems  Reviewed menstrual diary  Discussed treatment options for menorrhagia  Patient would like to remain conservative  She is due for more routine blood work through her primary care physician, will add CBC with diff, ferritin and iron levels  Also discussed use of iron supplements week of menstrual cycle  She will call if less than 21 day cycle  Return to office in 1 year or p r n  No problem-specific Assessment & Plan notes found for this encounter  Diagnoses and all orders for this visit:    Encounter for annual routine gynecological examination  -     Liquid-based pap, screening    Encounter for screening mammogram for breast cancer  -     Mammo screening bilateral w 3d & cad; Future    Menorrhagia with regular cycle  -     CBC and differential  -     Iron  -     Ferritin    Other orders  -     cetirizine (ZyrTEC) 10 mg tablet; Take 10 mg by mouth          Subjective:      Patient ID: Jessica Gongora is a 48 y o  female  HPI       This is a pleasant 70-year-old female  ( x2, age 22, 25) presents for her annual gyn exam   She states her menstrual cycles are approximately every 3-7 weeks lasting 4-10 days described as heavy  She denies any changes in bowel or bladder function  Workup last year revealed iron deficiency anemia, resolved, pelvic ultrasound revealing 1 7 cm intramural fibroid  Patient has been in a monogamous relationship with her  for over 29 years  Method of contraception has been vasectomy  She does get occasional hot flashes which are tolerable  She has been more stressed out, occupation , very busy tax season      The following portions of the patient's history were reviewed and updated as appropriate: allergies, current medications, past family history, past medical history, past social history, past surgical history and problem list     Review of Systems   Constitutional: Negative for fatigue, fever and unexpected weight change  Respiratory: Negative for cough, chest tightness, shortness of breath and wheezing  Cardiovascular: Negative  Negative for chest pain and palpitations  Gastrointestinal: Negative  Negative for abdominal distention, abdominal pain, blood in stool, constipation, diarrhea, nausea and vomiting  Genitourinary: Negative  Negative for difficulty urinating, dyspareunia, dysuria, flank pain, frequency, genital sores, hematuria, pelvic pain, urgency, vaginal bleeding, vaginal discharge and vaginal pain  Skin: Negative for rash  Objective:      /84   Ht 5' 3" (1 6 m)   Wt 65 8 kg (145 lb)   LMP 04/24/2021   BMI 25 69 kg/m²          Physical Exam  Constitutional:       Appearance: Normal appearance  She is well-developed  Cardiovascular:      Rate and Rhythm: Normal rate and regular rhythm  Pulmonary:      Effort: Pulmonary effort is normal       Breath sounds: Normal breath sounds  Chest:      Breasts:         Right: No inverted nipple, mass, nipple discharge, skin change or tenderness  Left: No inverted nipple, mass, nipple discharge, skin change or tenderness  Abdominal:      General: Bowel sounds are normal  There is no distension  Palpations: Abdomen is soft  Tenderness: There is no abdominal tenderness  There is no guarding or rebound  Genitourinary:     Labia:         Right: No rash, tenderness or lesion  Left: No rash, tenderness or lesion  Vagina: Normal  No signs of injury  No vaginal discharge or tenderness  Cervix: No cervical motion tenderness, discharge, friability, lesion, erythema or cervical bleeding  Uterus: Not enlarged and not tender         Adnexa:         Right: No mass, tenderness or fullness  Left: No mass, tenderness or fullness  Skin:     General: Skin is dry  Neurological:      Mental Status: She is alert and oriented to person, place, and time

## 2021-05-08 LAB
HPV HR 12 DNA CVX QL NAA+PROBE: NEGATIVE
HPV16 DNA CVX QL NAA+PROBE: NEGATIVE
HPV18 DNA CVX QL NAA+PROBE: NEGATIVE

## 2021-05-13 LAB
LAB AP GYN PRIMARY INTERPRETATION: NORMAL
LAB AP LMP: NORMAL
Lab: NORMAL

## 2021-05-14 ENCOUNTER — DOCTOR'S OFFICE (OUTPATIENT)
Dept: URBAN - NONMETROPOLITAN AREA CLINIC 1 | Facility: CLINIC | Age: 51
Setting detail: OPHTHALMOLOGY
End: 2021-05-14
Payer: COMMERCIAL

## 2021-05-14 VITALS — HEIGHT: 55 IN

## 2021-05-14 DIAGNOSIS — H25.13: ICD-10-CM

## 2021-05-14 DIAGNOSIS — H52.4: ICD-10-CM

## 2021-05-14 DIAGNOSIS — H40.013: ICD-10-CM

## 2021-05-14 DIAGNOSIS — H52.223: ICD-10-CM

## 2021-05-14 PROCEDURE — 92015 DETERMINE REFRACTIVE STATE: CPT | Performed by: OPTOMETRIST

## 2021-05-14 PROCEDURE — 92014 COMPRE OPH EXAM EST PT 1/>: CPT | Performed by: OPTOMETRIST

## 2021-05-14 ASSESSMENT — AXIALLENGTH_DERIVED
OD_AL: 23.6003
OD_AL: 23.6003
OS_AL: 23.7474

## 2021-05-14 ASSESSMENT — KERATOMETRY
OS_AXISANGLE_DEGREES: 011
OS_K2POWER_DIOPTERS: 44.75
OS_K1POWER_DIOPTERS: 43.00
OD_K2POWER_DIOPTERS: 44.25
OD_AXISANGLE_DEGREES: 171
OD_K1POWER_DIOPTERS: 43.50

## 2021-05-14 ASSESSMENT — REFRACTION_MANIFEST
OS_VA1: 20/20
OS_VA2: 20/20
OS_SPHERE: PLANO
OD_ADD: +2.50
OD_VA2: 20/20
OD_CYLINDER: -1.25
OS_ADD: +2.50
OS_CYLINDER: -1.50
OD_SPHERE: +0.25
OS_AXIS: 015
OD_VA1: 20/20
OD_AXIS: 165

## 2021-05-14 ASSESSMENT — PACHYMETRY
OS_CT_CORRECTION: -1
OS_CT_UM: 560
OD_CT_CORRECTION: -1
OD_CT_UM: 569

## 2021-05-14 ASSESSMENT — TONOMETRY
OD_IOP_MMHG: 15
OS_IOP_MMHG: 15

## 2021-05-14 ASSESSMENT — REFRACTION_CURRENTRX
OS_VPRISM_DIRECTION: PROGS
OD_CYLINDER: -1.25
OD_AXIS: 169
OS_SPHERE: PLANO
OS_AXIS: 11
OD_OVR_VA: 20/
OD_VPRISM_DIRECTION: PROGS
OD_SPHERE: PLANO
OS_OVR_VA: 20/
OS_CYLINDER: -1.50
OD_ADD: +2.25
OS_ADD: +2.25

## 2021-05-14 ASSESSMENT — REFRACTION_AUTOREFRACTION
OS_AXIS: 011
OD_AXIS: 162
OS_SPHERE: +0.25
OS_CYLINDER: -2.00
OD_CYLINDER: -1.25
OD_SPHERE: +0.25

## 2021-05-14 ASSESSMENT — VISUAL ACUITY
OD_BCVA: 20/20
OS_BCVA: 20/20

## 2021-05-14 ASSESSMENT — CONFRONTATIONAL VISUAL FIELD TEST (CVF)
OD_FINDINGS: FULL
OS_FINDINGS: FULL

## 2021-05-14 ASSESSMENT — SPHEQUIV_DERIVED
OD_SPHEQUIV: -0.375
OD_SPHEQUIV: -0.375
OS_SPHEQUIV: -0.75

## 2021-05-14 ASSESSMENT — LID EXAM ASSESSMENTS
OD_BLEPHARITIS: T
OS_BLEPHARITIS: T

## 2021-05-14 ASSESSMENT — SUPERFICIAL PUNCTATE KERATITIS (SPK): OD_SPK: ABSENT

## 2021-06-08 DIAGNOSIS — I71.2 THORACIC AORTIC ANEURYSM, WITHOUT RUPTURE (HCC): ICD-10-CM

## 2021-06-08 DIAGNOSIS — R00.2 PALPITATIONS: ICD-10-CM

## 2021-06-09 ENCOUNTER — TELEPHONE (OUTPATIENT)
Dept: PREADMISSION TESTING | Facility: HOSPITAL | Age: 51
End: 2021-06-09

## 2021-06-09 VITALS — HEIGHT: 63 IN | WEIGHT: 145 LBS | BODY MASS INDEX: 25.69 KG/M2

## 2021-06-10 ENCOUNTER — TELEPHONE (OUTPATIENT)
Dept: SURGERY | Facility: HOSPITAL | Age: 51
End: 2021-06-10

## 2021-06-10 DIAGNOSIS — E04.1 NONTOXIC SINGLE THYROID NODULE: ICD-10-CM

## 2021-06-10 DIAGNOSIS — R31.0 GROSS HEMATURIA: ICD-10-CM

## 2021-06-10 DIAGNOSIS — R30.0 DYSURIA: ICD-10-CM

## 2021-06-10 DIAGNOSIS — R10.9 UNSPECIFIED ABDOMINAL PAIN: ICD-10-CM

## 2021-06-10 DIAGNOSIS — Z12.31 ENCOUNTER FOR SCREENING MAMMOGRAM FOR MALIGNANT NEOPLASM OF BREAST: ICD-10-CM

## 2021-06-11 ENCOUNTER — ANESTHESIA EVENT (OUTPATIENT)
Dept: GASTROENTEROLOGY | Facility: HOSPITAL | Age: 51
End: 2021-06-11

## 2021-06-11 ENCOUNTER — ANESTHESIA (OUTPATIENT)
Dept: GASTROENTEROLOGY | Facility: HOSPITAL | Age: 51
End: 2021-06-11

## 2021-06-11 ENCOUNTER — HOSPITAL ENCOUNTER (OUTPATIENT)
Dept: GASTROENTEROLOGY | Facility: HOSPITAL | Age: 51
Setting detail: OUTPATIENT SURGERY
Discharge: HOME | End: 2021-06-11
Attending: INTERNAL MEDICINE
Payer: COMMERCIAL

## 2021-06-11 VITALS
RESPIRATION RATE: 16 BRPM | SYSTOLIC BLOOD PRESSURE: 125 MMHG | HEIGHT: 63 IN | WEIGHT: 140 LBS | HEART RATE: 79 BPM | OXYGEN SATURATION: 100 % | BODY MASS INDEX: 24.8 KG/M2 | TEMPERATURE: 98.2 F | DIASTOLIC BLOOD PRESSURE: 75 MMHG

## 2021-06-11 DIAGNOSIS — Z12.11 ENCOUNTER FOR SCREENING FOR MALIGNANT NEOPLASM OF COLON: ICD-10-CM

## 2021-06-11 DIAGNOSIS — K21.9 GASTRO-ESOPHAGEAL REFLUX DISEASE WITHOUT ESOPHAGITIS: ICD-10-CM

## 2021-06-11 LAB
EXT PREGNANCY TEST URINE: NEGATIVE
EXT. CONTROL: NORMAL

## 2021-06-11 PROCEDURE — 88305 TISSUE EXAM BY PATHOLOGIST: CPT | Performed by: PATHOLOGY

## 2021-06-11 PROCEDURE — 88342 IMHCHEM/IMCYTCHM 1ST ANTB: CPT | Performed by: PATHOLOGY

## 2021-06-11 PROCEDURE — 81025 URINE PREGNANCY TEST: CPT | Performed by: INTERNAL MEDICINE

## 2021-06-11 RX ORDER — SODIUM CHLORIDE, SODIUM LACTATE, POTASSIUM CHLORIDE, CALCIUM CHLORIDE 600; 310; 30; 20 MG/100ML; MG/100ML; MG/100ML; MG/100ML
INJECTION, SOLUTION INTRAVENOUS CONTINUOUS PRN
Status: DISCONTINUED | OUTPATIENT
Start: 2021-06-11 | End: 2021-06-11

## 2021-06-11 RX ORDER — GLYCOPYRROLATE 0.2 MG/ML
INJECTION INTRAMUSCULAR; INTRAVENOUS AS NEEDED
Status: DISCONTINUED | OUTPATIENT
Start: 2021-06-11 | End: 2021-06-11

## 2021-06-11 RX ORDER — PROPOFOL 10 MG/ML
INJECTION, EMULSION INTRAVENOUS AS NEEDED
Status: DISCONTINUED | OUTPATIENT
Start: 2021-06-11 | End: 2021-06-11

## 2021-06-11 RX ORDER — SODIUM CHLORIDE, SODIUM LACTATE, POTASSIUM CHLORIDE, CALCIUM CHLORIDE 600; 310; 30; 20 MG/100ML; MG/100ML; MG/100ML; MG/100ML
125 INJECTION, SOLUTION INTRAVENOUS CONTINUOUS
Status: CANCELLED | OUTPATIENT
Start: 2021-06-11 | End: 2021-06-12

## 2021-06-11 RX ORDER — LIDOCAINE HYDROCHLORIDE 10 MG/ML
INJECTION, SOLUTION EPIDURAL; INFILTRATION; INTRACAUDAL; PERINEURAL AS NEEDED
Status: DISCONTINUED | OUTPATIENT
Start: 2021-06-11 | End: 2021-06-11

## 2021-06-11 RX ADMIN — GLYCOPYRROLATE 0.2 MG: 0.2 INJECTION, SOLUTION INTRAMUSCULAR; INTRAVENOUS at 11:05

## 2021-06-11 RX ADMIN — PROPOFOL 200 MG: 10 INJECTION, EMULSION INTRAVENOUS at 11:08

## 2021-06-11 RX ADMIN — PROPOFOL 100 MG: 10 INJECTION, EMULSION INTRAVENOUS at 11:13

## 2021-06-11 RX ADMIN — LIDOCAINE HYDROCHLORIDE 50 MG: 10 INJECTION, SOLUTION EPIDURAL; INFILTRATION; INTRACAUDAL; PERINEURAL at 11:08

## 2021-06-11 RX ADMIN — SODIUM CHLORIDE, SODIUM LACTATE, POTASSIUM CHLORIDE, AND CALCIUM CHLORIDE: .6; .31; .03; .02 INJECTION, SOLUTION INTRAVENOUS at 11:05

## 2021-06-11 NOTE — ANESTHESIA PREPROCEDURE EVALUATION
Procedure:  EGD  COLONOSCOPY    Relevant Problems   CARDIO  MVP        Physical Exam    Airway    Mallampati score: II  TM Distance: >3 FB  Neck ROM: full     Dental   Comment: Cap mon upper tooth not cemented, upper dentures,     Cardiovascular  Cardiovascular exam normal    Pulmonary  Pulmonary exam normal     Other Findings        Anesthesia Plan  ASA Score- 2     Anesthesia Type- IV sedation with anesthesia with ASA Monitors  Additional Monitors:   Airway Plan:           Plan Factors-    Chart reviewed  Patient summary reviewed  Induction- intravenous  Postoperative Plan-     Informed Consent- Anesthetic plan and risks discussed with patient  I personally reviewed this patient with the CRNA  Discussed and agreed on the Anesthesia Plan with the CRNA  Radha Rasmussen

## 2021-06-11 NOTE — H&P
History and Physical -  [facility]  Merlinda Carder 48 y o  female MRN: [id number]                  HPI: Merlinda Carder is a 48y o  year old female who presents for egd and colonoscopy for reflux and diarrhea       REVIEW OF SYSTEMS: Per the HPI, and otherwise unremarkable      Historical Information   Past Medical History:   Diagnosis Date    Anxiety     Aortic anomaly     monitored by Cardiology    BRCA1 negative     BRCA2 negative     Female infertility     clomid tx in past; ultimately conceived x 2 naturally    Kidney stone     MVP (mitral valve prolapse)     Urinary tract infection     Varicella      Past Surgical History:   Procedure Laterality Date    NO PAST SURGERIES       Social History   Social History     Substance and Sexual Activity   Alcohol Use Not Currently    Comment: ocassionally     Social History     Substance and Sexual Activity   Drug Use Never     Social History     Tobacco Use   Smoking Status Former Smoker   Smokeless Tobacco Never Used   Tobacco Comment    "20 plus years ago"     Family History   Problem Relation Age of Onset    Breast cancer Mother 71    Heart attack Father     Cervical cancer Sister     Heart attack Maternal Grandfather     Stroke Paternal Grandfather     Ovarian cancer Paternal Aunt     No Known Problems Daughter     No Known Problems Maternal Grandmother     No Known Problems Paternal Grandmother     No Known Problems Daughter     Breast cancer Cousin 50    BRCA1 Negative Cousin     Breast cancer Paternal Aunt     Miscarriages / Stillbirths Paternal Aunt     No Known Problems Maternal Aunt     No Known Problems Half-Sister     Colon cancer Neg Hx     Uterine cancer Neg Hx        Meds/Allergies       Current Outpatient Medications:     cetirizine (ZyrTEC) 10 mg tablet    Allergies   Allergen Reactions    Cefaclor Anaphylaxis     Reaction Date: 25Nov2003;     Penicillins Angioedema     Reaction Date: 25Nov2003;  Sulfa Antibiotics Anaphylaxis     Reaction Date: 25Nov2003; Objective     /77   Pulse 96   Temp 98 1 °F (36 7 °C) (Oral)   Resp 14   Ht 5' 3" (1 6 m)   Wt 63 5 kg (140 lb)   LMP 05/23/2021   SpO2 100%   BMI 24 80 kg/m²       PHYSICAL EXAM    Gen: NAD  Head: NCAT  CV: RRR  CHEST: Clear  ABD: soft, NT/ND  EXT: no edema      ASSESSMENT/PLAN:  This is a 48y o  year old female here for egd and colonoscopy, and she is stable and optimized for her procedure

## 2021-06-11 NOTE — ANESTHESIA POSTPROCEDURE EVALUATION
Post-Op Assessment Note    CV Status:  Stable  Pain Score: 0    Pain management: adequate     Mental Status:  Alert and awake   Hydration Status:  Euvolemic   PONV Controlled:  Controlled   Airway Patency:  Patent      Post Op Vitals Reviewed: Yes      Staff: CRNA         No complications documented      BP   116/69   Temp      Pulse  88   Resp   18   SpO2   100

## 2021-06-18 ENCOUNTER — HOSPITAL ENCOUNTER (OUTPATIENT)
Dept: ULTRASOUND IMAGING | Facility: HOSPITAL | Age: 51
Discharge: HOME | End: 2021-06-18
Payer: COMMERCIAL

## 2021-06-18 ENCOUNTER — HOSPITAL ENCOUNTER (OUTPATIENT)
Dept: CT IMAGING | Facility: HOSPITAL | Age: 51
Discharge: HOME | End: 2021-06-18
Payer: COMMERCIAL

## 2021-06-18 DIAGNOSIS — R00.2 PALPITATIONS: ICD-10-CM

## 2021-06-18 DIAGNOSIS — E04.1 NONTOXIC SINGLE THYROID NODULE: ICD-10-CM

## 2021-06-18 DIAGNOSIS — I71.2 THORACIC AORTIC ANEURYSM, WITHOUT RUPTURE (HCC): ICD-10-CM

## 2021-06-18 PROCEDURE — 76536 US EXAM OF HEAD AND NECK: CPT

## 2021-06-18 PROCEDURE — 71250 CT THORAX DX C-: CPT

## 2022-03-08 ENCOUNTER — DOCTOR'S OFFICE (OUTPATIENT)
Dept: URBAN - NONMETROPOLITAN AREA CLINIC 1 | Facility: CLINIC | Age: 52
Setting detail: OPHTHALMOLOGY
End: 2022-03-08
Payer: COMMERCIAL

## 2022-03-08 DIAGNOSIS — H01.001: ICD-10-CM

## 2022-03-08 DIAGNOSIS — H01.005: ICD-10-CM

## 2022-03-08 DIAGNOSIS — H01.002: ICD-10-CM

## 2022-03-08 DIAGNOSIS — H01.004: ICD-10-CM

## 2022-03-08 PROCEDURE — 92012 INTRM OPH EXAM EST PATIENT: CPT | Performed by: OPTOMETRIST

## 2022-03-08 ASSESSMENT — REFRACTION_MANIFEST
OS_CYLINDER: -1.50
OD_CYLINDER: -1.25
OS_AXIS: 015
OS_SPHERE: PLANO
OD_AXIS: 165
OD_ADD: +2.50
OS_ADD: +2.50
OD_VA2: 20/20
OS_VA1: 20/20
OD_SPHERE: +0.25
OS_VA2: 20/20
OD_VA1: 20/20

## 2022-03-08 ASSESSMENT — AXIALLENGTH_DERIVED
OS_AL: 23.7474
OD_AL: 23.7474
OD_AL: 23.6003

## 2022-03-08 ASSESSMENT — REFRACTION_CURRENTRX
OD_AXIS: 169
OS_SPHERE: PLANO
OD_VPRISM_DIRECTION: PROGS
OS_AXIS: 11
OD_CYLINDER: -1.25
OS_ADD: +2.25
OS_OVR_VA: 20/
OD_OVR_VA: 20/
OS_CYLINDER: -1.50
OD_SPHERE: PLANO
OS_VPRISM_DIRECTION: PROGS
OD_ADD: +2.25

## 2022-03-08 ASSESSMENT — PACHYMETRY
OD_CT_UM: 569
OD_CT_CORRECTION: -1
OS_CT_CORRECTION: -1
OS_CT_UM: 560

## 2022-03-08 ASSESSMENT — DRY EYES - PHYSICIAN NOTES: OD_GENERALCOMMENTS: SCATTERED

## 2022-03-08 ASSESSMENT — CONFRONTATIONAL VISUAL FIELD TEST (CVF)
OD_FINDINGS: FULL
OS_FINDINGS: FULL

## 2022-03-08 ASSESSMENT — TONOMETRY
OS_IOP_MMHG: 14
OD_IOP_MMHG: 14

## 2022-03-08 ASSESSMENT — REFRACTION_AUTOREFRACTION
OS_SPHERE: 0.00
OS_CYLINDER: -1.50
OD_AXIS: 160
OD_CYLINDER: -1.50
OD_SPHERE: 0.00
OS_AXIS: 011

## 2022-03-08 ASSESSMENT — KERATOMETRY
OD_K2POWER_DIOPTERS: 44.25
OS_K2POWER_DIOPTERS: 44.75
OS_AXISANGLE_DEGREES: 011
OD_K1POWER_DIOPTERS: 43.50
OS_K1POWER_DIOPTERS: 43.00
OD_AXISANGLE_DEGREES: 171

## 2022-03-08 ASSESSMENT — LID EXAM ASSESSMENTS
OD_BLEPHARITIS: T
OS_BLEPHARITIS: T
OD_ANGULAR_BLEPHARITIS: RLL RUL T 1+

## 2022-03-08 ASSESSMENT — VISUAL ACUITY
OD_BCVA: 20/20
OS_BCVA: 20/20-2

## 2022-03-08 ASSESSMENT — SPHEQUIV_DERIVED
OD_SPHEQUIV: -0.75
OS_SPHEQUIV: -0.75
OD_SPHEQUIV: -0.375

## 2022-03-08 ASSESSMENT — SUPERFICIAL PUNCTATE KERATITIS (SPK): OD_SPK: T 1+

## 2022-03-21 ENCOUNTER — HOSPITAL ENCOUNTER (OUTPATIENT)
Dept: RADIOLOGY | Facility: CLINIC | Age: 52
Discharge: HOME | End: 2022-03-21
Payer: COMMERCIAL

## 2022-03-21 VITALS — BODY MASS INDEX: 24.8 KG/M2 | WEIGHT: 140 LBS | HEIGHT: 63 IN

## 2022-03-21 DIAGNOSIS — Z12.31 ENCOUNTER FOR SCREENING MAMMOGRAM FOR MALIGNANT NEOPLASM OF BREAST: ICD-10-CM

## 2022-03-21 PROCEDURE — 77067 SCR MAMMO BI INCL CAD: CPT

## 2022-03-21 PROCEDURE — 77063 BREAST TOMOSYNTHESIS BI: CPT

## 2022-05-20 ENCOUNTER — DOCTOR'S OFFICE (OUTPATIENT)
Dept: URBAN - NONMETROPOLITAN AREA CLINIC 1 | Facility: CLINIC | Age: 52
Setting detail: OPHTHALMOLOGY
End: 2022-05-20
Payer: COMMERCIAL

## 2022-05-20 DIAGNOSIS — H52.223: ICD-10-CM

## 2022-05-20 PROBLEM — H25.11 CATARACT NUCLEAR SCLEROSIS AGE RELATED; RIGHT EYE, LEFT EYE, BOTH EYES: Status: ACTIVE | Noted: 2019-10-09

## 2022-05-20 PROBLEM — H10.523 ANGULAR  BLEPHAROCONJUNCTIVITIS; BOTH EYES: Status: RESOLVED | Noted: 2022-03-08 | Resolved: 2022-05-20

## 2022-05-20 PROBLEM — H25.13 CATARACT NUCLEAR SCLEROSIS AGE RELATED; RIGHT EYE, LEFT EYE, BOTH EYES: Status: ACTIVE | Noted: 2019-10-09

## 2022-05-20 PROBLEM — H40.013 GLAUCOMA SUSPECT, LOW RISK; BOTH EYES: Status: ACTIVE | Noted: 2020-02-14

## 2022-05-20 PROBLEM — H01.004 BLEPHARITIS; RIGHT UPPER LID, RIGHT LOWER LID, LEFT UPPER LID, LEFT LOWER LID: Status: ACTIVE | Noted: 2022-03-08

## 2022-05-20 PROBLEM — H01.005 BLEPHARITIS; RIGHT UPPER LID, RIGHT LOWER LID, LEFT UPPER LID, LEFT LOWER LID: Status: ACTIVE | Noted: 2022-03-08

## 2022-05-20 PROBLEM — H01.001 BLEPHARITIS; RIGHT UPPER LID, RIGHT LOWER LID, LEFT UPPER LID, LEFT LOWER LID: Status: ACTIVE | Noted: 2022-03-08

## 2022-05-20 PROBLEM — H25.12 CATARACT NUCLEAR SCLEROSIS AGE RELATED; RIGHT EYE, LEFT EYE, BOTH EYES: Status: ACTIVE | Noted: 2019-10-09

## 2022-05-20 PROBLEM — H01.002 BLEPHARITIS; RIGHT UPPER LID, RIGHT LOWER LID, LEFT UPPER LID, LEFT LOWER LID: Status: ACTIVE | Noted: 2022-03-08

## 2022-05-20 PROCEDURE — 92014 COMPRE OPH EXAM EST PT 1/>: CPT | Performed by: OPTOMETRIST

## 2022-05-20 ASSESSMENT — KERATOMETRY
OS_K2POWER_DIOPTERS: 44.75
OD_K1POWER_DIOPTERS: 43.50
OS_AXISANGLE_DEGREES: 011
OD_K2POWER_DIOPTERS: 44.25
OS_K1POWER_DIOPTERS: 43.00
OD_AXISANGLE_DEGREES: 171

## 2022-05-20 ASSESSMENT — LID EXAM ASSESSMENTS
OD_ANGULAR_BLEPHARITIS: RLL RUL T 1+
OD_BLEPHARITIS: T
OS_BLEPHARITIS: T

## 2022-05-20 ASSESSMENT — SUPERFICIAL PUNCTATE KERATITIS (SPK): OD_SPK: T 1+

## 2022-05-20 ASSESSMENT — REFRACTION_MANIFEST
OD_VA2: 20/20
OD_ADD: +2.50
OS_VA1: 20/20
OS_AXIS: 015
OD_CYLINDER: -1.50
OS_SPHERE: +0.50
OD_VA1: 20/20
OS_ADD: +2.50
OS_VA2: 20/20
OS_CYLINDER: -1.50
OD_AXIS: 165
OD_SPHERE: +0.25

## 2022-05-20 ASSESSMENT — SPHEQUIV_DERIVED
OD_SPHEQUIV: -0.25
OS_SPHEQUIV: -0.25
OD_SPHEQUIV: -0.5
OS_SPHEQUIV: -0.25

## 2022-05-20 ASSESSMENT — REFRACTION_CURRENTRX
OS_SPHERE: +0.50
OS_VPRISM_DIRECTION: PROGS
OD_OVR_VA: 20/
OD_VPRISM_DIRECTION: PROGS
OD_ADD: +1.75
OS_CYLINDER: -1.00
OS_OVR_VA: 20/
OD_CYLINDER: -1.75
OD_AXIS: 161
OD_SPHERE: +0.75
OS_AXIS: 154

## 2022-05-20 ASSESSMENT — PACHYMETRY
OD_CT_CORRECTION: -1
OS_CT_CORRECTION: -1
OD_CT_UM: 569
OS_CT_UM: 560

## 2022-05-20 ASSESSMENT — VISUAL ACUITY
OD_BCVA: 20/20
OS_BCVA: 20/20-1

## 2022-05-20 ASSESSMENT — AXIALLENGTH_DERIVED
OS_AL: 23.5516
OD_AL: 23.6491
OD_AL: 23.5516
OS_AL: 23.5516

## 2022-05-20 ASSESSMENT — TONOMETRY
OS_IOP_MMHG: 18
OD_IOP_MMHG: 18

## 2022-05-20 ASSESSMENT — REFRACTION_AUTOREFRACTION
OD_AXIS: 159
OD_SPHERE: +0.50
OS_AXIS: 014
OD_CYLINDER: -1.50
OS_SPHERE: +0.50
OS_CYLINDER: -1.50

## 2022-05-20 ASSESSMENT — CONFRONTATIONAL VISUAL FIELD TEST (CVF)
OD_FINDINGS: FULL
OS_FINDINGS: FULL

## 2022-05-20 ASSESSMENT — DRY EYES - PHYSICIAN NOTES: OD_GENERALCOMMENTS: SCATTERED

## 2022-07-13 ENCOUNTER — HOSPITAL ENCOUNTER (OUTPATIENT)
Dept: CT IMAGING | Facility: HOSPITAL | Age: 52
Discharge: HOME | End: 2022-07-13
Payer: COMMERCIAL

## 2022-07-13 DIAGNOSIS — R30.0 DYSURIA: ICD-10-CM

## 2022-07-13 DIAGNOSIS — R31.0 GROSS HEMATURIA: ICD-10-CM

## 2022-07-13 DIAGNOSIS — R10.9 UNSPECIFIED ABDOMINAL PAIN: ICD-10-CM

## 2022-07-13 PROCEDURE — 74176 CT ABD & PELVIS W/O CONTRAST: CPT

## 2023-01-04 NOTE — PROGRESS NOTES
Daily Note     Today's date: 2018  Patient name: Stas Garcia  : 1970  MRN: [id number]  Referring provider: Rubén Tan MD  Dx:   Encounter Diagnosis     ICD-10-CM    1  Adhesive bursitis of left shoulder M75 02    2  Acute pain of left shoulder M25 512    3  Rotator cuff tendonitis, left M75 82      Subjective: No new c/o pain today  Objective: See treatment diary below    Assessment: Tolerated treatment well  Patient exhibited good technique with therapeutic exercises and would benefit from continued PT to decrease L shoulder pain and spasm and to increase L shoulder ROM/strength  Plan: Continue per plan of care  Progress treatment as tolerated        Manual  11/15 11/23 11/29     MT, ROM 20' 20' 20'     Supine ROM 10' 10' 5'             Exercise Diary  11/15 11/23 11/29     UBC        FWC-Codman's 5# 30x 5# 30x 5# 30x     FWC-Curls,Tri 5# 30x 5# 30x 5# 30x     Finger Ladder 3x ea 3x ea 3x ea     Alec-BP,PD,Lats,Row   25# 30x     W/P-PNF,IR,ER,PU,PS,Throw-Top,Mid,Bot 10# 30x 10# 30x 10# 30x     WP-OH,IR 2x5' 2x5' 2x5'     Wall Slides-IYV/BallStabExs 3x10 3x10      ME, PE 15' 15' 15'             Modalities 11/15 11/23 11/29     MH&ES 20' 20' NT     US 10' 10' 10'
no

## 2023-03-24 ENCOUNTER — HOSPITAL ENCOUNTER (OUTPATIENT)
Dept: RADIOLOGY | Facility: CLINIC | Age: 53
End: 2023-03-24

## 2023-03-24 VITALS — WEIGHT: 140 LBS | BODY MASS INDEX: 24.8 KG/M2 | HEIGHT: 63 IN

## 2023-03-24 DIAGNOSIS — Z12.31 ENCOUNTER FOR SCREENING MAMMOGRAM FOR MALIGNANT NEOPLASM OF BREAST: ICD-10-CM

## 2023-11-23 NOTE — PROGRESS NOTES
Today's date: 2018  Patient name: Celina Whatley  : 1970  MRN: [id number]  Referring provider: Glen Lindsey MD  Dx:   Encounter Diagnosis     ICD-10-CM    1  Adhesive bursitis of left shoulder M75 02    2  Acute pain of left shoulder M25 512    3  Rotator cuff tendonitis, left M75 82      Subjective: Ana Hebert reports that her left shoulder is a little sore today and stiff  Objective: See treatment diary below    Assessment: Tolerated treatment well  Patient exhibited good technique with therapeutic exercises and would benefit from continued PT    Plan: Continue per plan of care  Progress treatment as tolerated         Manual         MT, ROM     15'   Supine ROM     10'           Exercise Diary         [facility]        FWC-Codman's     5#-30x   FWC-Curls,Tri     5#-30x   Power Web        Digiflex        Finger Ladder     3x   Alec-BP,PD,Lats,Row     30#-30x   Trimax-BP        W/P-PNF,IR,ER,PU,PS,Throw-Top,Mid,Bot     10#-30x   WP-OH,IR     2x5'   Wall Slides-IYV     3x10   ME, PE     15'           Modalities        MH&ES     20'   US     8'
syncope

## 2024-02-09 ENCOUNTER — HOSPITAL ENCOUNTER (OUTPATIENT)
Dept: NON INVASIVE DIAGNOSTICS | Facility: HOSPITAL | Age: 54
Discharge: HOME | End: 2024-02-09
Payer: COMMERCIAL

## 2024-02-09 ENCOUNTER — HOSPITAL ENCOUNTER (OUTPATIENT)
Dept: MRI IMAGING | Facility: HOSPITAL | Age: 54
End: 2024-02-09
Payer: COMMERCIAL

## 2024-02-09 VITALS
SYSTOLIC BLOOD PRESSURE: 125 MMHG | HEIGHT: 63 IN | DIASTOLIC BLOOD PRESSURE: 75 MMHG | HEART RATE: 70 BPM | WEIGHT: 139 LBS | BODY MASS INDEX: 24.63 KG/M2

## 2024-02-09 DIAGNOSIS — I77.810 THORACIC AORTIC ECTASIA (HCC): ICD-10-CM

## 2024-02-09 LAB
AORTIC ROOT: 3.1 CM
APICAL FOUR CHAMBER EJECTION FRACTION: 77 %
ASCENDING AORTA: 3.7 CM
BSA FOR ECHO PROCEDURE: 1.66 M2
E WAVE DECELERATION TIME: 223 MS
E/A RATIO: 0.68
FRACTIONAL SHORTENING: 41 (ref 28–44)
INTERVENTRICULAR SEPTUM IN DIASTOLE (PARASTERNAL SHORT AXIS VIEW): 0.8 CM
INTERVENTRICULAR SEPTUM: 0.8 CM (ref 0.6–1.1)
LAAS-AP2: 11.1 CM2
LAAS-AP4: 9.2 CM2
LEFT ATRIUM SIZE: 3.1 CM
LEFT ATRIUM VOLUME (MOD BIPLANE): 23 ML
LEFT ATRIUM VOLUME INDEX (MOD BIPLANE): 13.9 ML/M2
LEFT INTERNAL DIMENSION IN SYSTOLE: 2.4 CM (ref 2.1–4)
LEFT VENTRICLE DIASTOLIC VOLUME (MOD BIPLANE): 44 ML
LEFT VENTRICLE DIASTOLIC VOLUME INDEX (MOD BIPLANE): 26.5 ML/M2
LEFT VENTRICLE SYSTOLIC VOLUME (MOD BIPLANE): 14 ML
LEFT VENTRICLE SYSTOLIC VOLUME INDEX (MOD BIPLANE): 8.4 ML/M2
LEFT VENTRICULAR INTERNAL DIMENSION IN DIASTOLE: 4.1 CM (ref 3.5–6)
LEFT VENTRICULAR POSTERIOR WALL IN END DIASTOLE: 0.9 CM
LEFT VENTRICULAR STROKE VOLUME: 53 ML
LV EF: 68 %
LVSV (TEICH): 53 ML
MV E'TISSUE VEL-LAT: 12 CM/S
MV E'TISSUE VEL-SEP: 12 CM/S
MV PEAK A VEL: 1.13 M/S
MV PEAK E VEL: 77 CM/S
MV STENOSIS PRESSURE HALF TIME: 65 MS
MV VALVE AREA P 1/2 METHOD: 3.38
RIGHT ATRIUM AREA SYSTOLE A4C: 7.8 CM2
RIGHT VENTRICLE ID DIMENSION: 3.2 CM
SINOTUBULAR JUNCTION: 3 CM
SL CV LEFT ATRIUM LENGTH A2C: 4.1 CM
SL CV LV EF: 68
SL CV PED ECHO LEFT VENTRICLE DIASTOLIC VOLUME (MOD BIPLANE) 2D: 73 ML
SL CV PED ECHO LEFT VENTRICLE SYSTOLIC VOLUME (MOD BIPLANE) 2D: 21 ML
SL CV SINUS OF VALSALVA 2D: 3.2 CM
STJ: 3 CM
TR MAX PG: 21 MMHG
TR PEAK VELOCITY: 2.3 M/S
TRICUSPID ANNULAR PLANE SYSTOLIC EXCURSION: 2 CM
TRICUSPID VALVE PEAK REGURGITATION VELOCITY: 2.28 M/S

## 2024-02-09 PROCEDURE — 93306 TTE W/DOPPLER COMPLETE: CPT

## 2024-02-09 PROCEDURE — G1004 CDSM NDSC: HCPCS

## 2024-02-09 PROCEDURE — C8911 MRA W/O FOL W/CONT, CHEST: HCPCS

## 2024-02-09 PROCEDURE — A9585 GADOBUTROL INJECTION: HCPCS | Performed by: RADIOLOGY

## 2024-02-09 RX ORDER — GADOBUTROL 604.72 MG/ML
5 INJECTION INTRAVENOUS
Status: COMPLETED | OUTPATIENT
Start: 2024-02-09 | End: 2024-02-09

## 2024-02-09 RX ADMIN — GADOBUTROL 5 ML: 604.72 INJECTION INTRAVENOUS at 13:33

## 2024-03-28 ENCOUNTER — HOSPITAL ENCOUNTER (OUTPATIENT)
Dept: RADIOLOGY | Facility: CLINIC | Age: 54
End: 2024-03-28
Payer: COMMERCIAL

## 2024-03-28 VITALS — WEIGHT: 145 LBS | BODY MASS INDEX: 25.69 KG/M2 | HEIGHT: 63 IN

## 2024-03-28 DIAGNOSIS — Z12.31 ENCOUNTER FOR SCREENING MAMMOGRAM FOR MALIGNANT NEOPLASM OF BREAST: ICD-10-CM

## 2024-03-28 PROCEDURE — 77063 BREAST TOMOSYNTHESIS BI: CPT

## 2024-03-28 PROCEDURE — 77067 SCR MAMMO BI INCL CAD: CPT

## 2024-04-18 ENCOUNTER — TELEPHONE (OUTPATIENT)
Age: 54
End: 2024-04-18

## 2024-04-18 NOTE — TELEPHONE ENCOUNTER
New Patient    What is the reason for the patient's appointment?: Non-obstructing Kidney Stones     What office location does the patient prefer?: [place]    Does patient have Imaging/Lab Results: Labs & CT Scan 4/17/24    Have patient records been requested?:  If No, are the records showing in Epic: Yes      INSURANCE:   Do we accept the patient's insurance or is the patient Self-Pay?: Yes    Insurance Provider: Mitch   Plan Type/Number:   Member ID#: [id number]       HISTORY:   Has the patient had any previous Urologist(s)?: No    Was the patient seen in the ED?: No    Has the patient had any outside testing done?: Labs & CT Scan 4/17/24    Does the patient have a personal history of cancer?: N/A    Pt scheduled for the next available of 5/22/24 and placed on wait list. Please review if appt is appropriate.

## 2024-04-18 NOTE — TELEPHONE ENCOUNTER
Unable to review CT results. Once reviewed we can make a decision. Please review ER precautions with pt as well.

## 2024-05-28 PROBLEM — N20.0 RENAL CALCULI: Status: ACTIVE | Noted: 2024-05-28

## 2024-05-28 NOTE — PROGRESS NOTES
UROLOGY PROGRESS NOTE         NAME: Bryanna Cohen  AGE: 53 y.o. SEX: female  : 1970   MRN: [id number]    DATE: 2024  TIME: 11:49 AM    Assessment and Plan   Procedures     Impression:   1. Renal calculi  2. History of recurrent UTIs       Plan: Our plan for recurrent nephrolithiasis will set up with nephrology for medical evaluation for stones.  She will continue currently her as needed Macrobid as prescribed by her PCP.  We will see her back in 2 years with a KUB or sooner if she has a concern.  Patient agrees      Chief Complaint     Chief Complaint   Patient presents with    Nephrolithiasis     Denies pain, nausea, vomiting, hematuria      History of Present Illness     HPI: Bryanna Cohen is a 53 y.o. year old female who presents with CT scan done 2024 that showed nonobstructing renal calculi.  4 mm left midpole stone.    Currently patient asymptomatic.  Reviewed the CAT scan with the patient including the 4 mm left midpole stone and the 1 mm right lower pole stone.  Patient states she has had a history of recurrent nephrolithiasis.  But no surgeries.    Also history recurrent UTIs which she takes as needed Macrobid she averages about 3/year.  We also talked about the possibility in the future of a 90-day course of Macrobid to help reduce the risk of UTIs.    Currently she is asymptomatic no irritable or obstructive voiding complaints no GI complaints.          The following portions of the patient's history were reviewed and updated as appropriate: allergies, current medications, past family history, past medical history, past social history, past surgical history and problem list.  Past Medical History:   Diagnosis Date    Anxiety     Aortic anomaly     monitored by Cardiology    BRCA1 negative     BRCA2 negative     Enlarged thoracic aorta (HCC)     Female infertility     clomid tx in past; ultimately conceived x 2 naturally    Kidney stone     MVP (mitral valve prolapse)      "Urinary tract infection     Varicella      Past Surgical History:   Procedure Laterality Date    BREAST CYST ASPIRATION Bilateral 2000     shoulder  Review of Systems     Const: Denies chills, fever and weight loss.  CV: Denies chest pain.  Resp: Denies SOB.  GI: Denies abdominal pain, nausea and vomiting.  : Denies symptoms other than stated above.  Musculo: Denies back pain.    Objective   Pulse 70   Temp 98.4 °F (36.9 °C)   Ht 5' 3\" (1.6 m)   Wt 68.9 kg (151 lb 12.8 oz)   SpO2 100%   BMI 26.89 kg/m²     Physical Exam  Const: Appears healthy and well developed. No signs of acute distress present.  Resp: Respirations are regular and unlabored.   CV: Rate is regular. Rhythm is regular.  Abdomen: Abdomen is soft, nontender, and nondistended. Kidneys are not palpable.  : Bladder nontender nondistended  Psych: Patient's attitude is cooperative. Mood is normal. Affect is normal.    Current Medications     Current Outpatient Medications:     ALPRAZolam (XANAX) 0.25 mg tablet, Take 0.25 mg by mouth daily as needed, Disp: , Rfl:     losartan (COZAAR) 50 mg tablet, Take 50 mg by mouth daily, Disp: , Rfl:         Geovanni Rodriguez MD        "

## 2024-06-03 ENCOUNTER — TELEPHONE (OUTPATIENT)
Dept: UROLOGY | Facility: CLINIC | Age: 54
End: 2024-06-03

## 2024-06-03 NOTE — TELEPHONE ENCOUNTER
Left message for pt to call office back. Please advise pt to get ct scan on cd for upcoming appt for Dr. Rodriguez to review.

## 2024-06-07 ENCOUNTER — OFFICE VISIT (OUTPATIENT)
Dept: UROLOGY | Facility: CLINIC | Age: 54
End: 2024-06-07
Payer: COMMERCIAL

## 2024-06-07 VITALS
DIASTOLIC BLOOD PRESSURE: 76 MMHG | SYSTOLIC BLOOD PRESSURE: 122 MMHG | OXYGEN SATURATION: 100 % | HEIGHT: 63 IN | HEART RATE: 70 BPM | TEMPERATURE: 98.4 F | WEIGHT: 151.8 LBS | BODY MASS INDEX: 26.9 KG/M2

## 2024-06-07 DIAGNOSIS — N20.0 RENAL CALCULI: Primary | ICD-10-CM

## 2024-06-07 DIAGNOSIS — Z87.440 HISTORY OF RECURRENT UTIS: ICD-10-CM

## 2024-06-07 PROCEDURE — 99204 OFFICE O/P NEW MOD 45 MIN: CPT | Performed by: UROLOGY

## 2024-06-13 ENCOUNTER — HOSPITAL ENCOUNTER (OUTPATIENT)
Dept: RADIOLOGY | Facility: HOSPITAL | Age: 54
Discharge: HOME | End: 2024-06-13
Attending: RADIOLOGY

## 2024-06-13 DIAGNOSIS — Z76.89 REFERRAL OF PATIENT WITHOUT EXAMINATION OR TREATMENT: ICD-10-CM

## 2024-10-16 LAB
25(OH)D3 SERPL-MCNC: 63 NG/ML (ref 30–100)
CHOLEST SERPL-MCNC: 211 MG/DL
CHOLEST/HDLC SERPL: 3.3 (CALC)
ESTRADIOL SERPL-MCNC: 29 PG/ML
HDLC SERPL-MCNC: 64 MG/DL
IRON SATN MFR SERPL: 29 % (CALC) (ref 16–45)
IRON SERPL-MCNC: 98 MCG/DL (ref 45–160)
LDLC SERPL CALC-MCNC: 131 MG/DL (CALC)
NONHDLC SERPL-MCNC: 147 MG/DL (CALC)
TIBC SERPL-MCNC: 334 MCG/DL (CALC) (ref 250–450)
TRIGL SERPL-MCNC: 67 MG/DL

## 2024-11-04 ENCOUNTER — OFFICE VISIT (OUTPATIENT)
Dept: FAMILY MEDICINE CLINIC | Facility: CLINIC | Age: 54
End: 2024-11-04
Payer: COMMERCIAL

## 2024-11-04 VITALS
SYSTOLIC BLOOD PRESSURE: 126 MMHG | HEART RATE: 70 BPM | BODY MASS INDEX: 27.26 KG/M2 | DIASTOLIC BLOOD PRESSURE: 67 MMHG | OXYGEN SATURATION: 98 % | TEMPERATURE: 98.2 F | WEIGHT: 153.88 LBS

## 2024-11-04 DIAGNOSIS — I10 ESSENTIAL HYPERTENSION: Primary | ICD-10-CM

## 2024-11-04 DIAGNOSIS — I34.0 NONRHEUMATIC MITRAL VALVE REGURGITATION: ICD-10-CM

## 2024-11-04 DIAGNOSIS — Z76.89 ENCOUNTER TO ESTABLISH CARE: ICD-10-CM

## 2024-11-04 DIAGNOSIS — Z87.440 HISTORY OF RECURRENT UTIS: ICD-10-CM

## 2024-11-04 DIAGNOSIS — L73.9 FOLLICULITIS OF LEFT AXILLA: ICD-10-CM

## 2024-11-04 DIAGNOSIS — I77.810 DILATION OF THORACIC AORTA (HCC): ICD-10-CM

## 2024-11-04 DIAGNOSIS — F41.1 GAD (GENERALIZED ANXIETY DISORDER): ICD-10-CM

## 2024-11-04 DIAGNOSIS — F41.0 PANIC ATTACKS: ICD-10-CM

## 2024-11-04 DIAGNOSIS — N95.1 PERIMENOPAUSAL SYMPTOMS: ICD-10-CM

## 2024-11-04 DIAGNOSIS — E04.1 THYROID NODULE: ICD-10-CM

## 2024-11-04 DIAGNOSIS — D50.8 OTHER IRON DEFICIENCY ANEMIA: ICD-10-CM

## 2024-11-04 DIAGNOSIS — E78.2 MIXED HYPERLIPIDEMIA: ICD-10-CM

## 2024-11-04 DIAGNOSIS — R63.5 WEIGHT GAIN: ICD-10-CM

## 2024-11-04 DIAGNOSIS — N20.0 KIDNEY STONES: ICD-10-CM

## 2024-11-04 PROBLEM — D64.9 ABSOLUTE ANEMIA: Status: ACTIVE | Noted: 2024-11-04

## 2024-11-04 PROCEDURE — 99204 OFFICE O/P NEW MOD 45 MIN: CPT | Performed by: PHYSICIAN ASSISTANT

## 2024-11-04 RX ORDER — NITROFURANTOIN 25; 75 MG/1; MG/1
50 CAPSULE ORAL AS NEEDED
COMMUNITY

## 2024-11-04 NOTE — PROGRESS NOTES
Assessment/Plan:       1. Essential hypertension  2. Mixed hyperlipidemia  3. Dilation of thoracic aorta (HCC)  4. Nonrheumatic mitral valve regurgitation  5. GABY (generalized anxiety disorder)  6. Panic attacks  7. Other iron deficiency anemia  8. Kidney stones  9. Thyroid nodule  10. Encounter to establish care  11. History of recurrent UTIs  12. Weight gain  13. Perimenopausal symptoms  14. Folliculitis of left axilla      This 53-year-old female wishes to establish care with me.  Patient is having some perimenopausal symptoms.   she wakes up at night drenched but is not having hot flashes during the day.  She also has gained weight from 1 37-1 53 over the last year.  She is not changed the way she is eating but rather believes that menopausal is adding to her easy weight gain.  We talked about using the lose it nannette on her phone in order to track her eating.  She is also considering joining a gym with her .  She is most frustrated over this weight gain that is very unanticipated.    She recently saw her gynecologist who she told about having perimenopausal symptoms.  I did review her lab work and her LH and FSH are both elevated consistent with menopause.  Her gynecologist put her on paroxetine and she took the medication for 4 days but got extremely nauseated and fatigued so she is no longer taking this medication.    As part of shared decision making, we talked about the role of cognitive behavioral therapy dealing with generalized anxiety and even perimenopausal symptoms.  I gave her a recommendation on the Kamari Olivares block available at her local library regarding anxiety panic and the role of cognitive therapy.  Patient is going to read these books and develop insight into the management of these conditions.    Patient just had genetic test performed in the area of cancer risk.  Her gynecologist wanted to immediately put her on estrogen but the patient is hesitant because of her perceived increased  risk of breast cancer.  Patient's mother had a history of breast cancer at age 70.  Patient's father's 2 sisters have had gynecologic cancer 1 of breast and 1 of ovarian.  At least 2 family members have been tested for BRCA1 and BRCA2 and these tests on the people who have had the cancer have been negative.  There is a paternal cousin who also had breast cancer at age 50.  No family member to date has been identified as carrying the BRCA1 or BRCA2 gene.    Patient's genetic test should be coming for us to have a discussion when I see her for her annual exam in 1 month.  There are nonhormonal therapies for perimenopausal symptoms that we can discuss and we could even talk about the potential role for hormonal replacement based upon her genetic profile.    Patient has had multiple kidney stones.  No stone has been analyzed but it has been assumed that they are calcium oxalate stones.  Patient had previously had a lot of [employer] iced tea and has decreased this dramatically with a lot fewer kidney stones as a result.  She keeps her urine clear as water in order to dilute out any crystals.  She has not recently had bouts of kidney stones.    Patient has been evaluated at the [facility] for mitral regurgitation that is not rheumatic in nature and also some mild dilation of the thoracic aorta.  The thoracic aorta is a 3.7 cm diameter and no surgery is recommended just some watchful waiting.    Patient has a history of hypertension.  She is well-controlled with losartan.  She had previously been taking metoprolol losartan is much more effective for her.    Patient has a history of iron deficiency.  She is taking 325 mg of iron every other day and her deficiency state has improved dramatically.  She also has a history of vitamin D deficiency treated with 50,000 units of vitamin D for 12 weeks and now just regular supplement.  Her most recent vitamin D state is at 63 and considered normal.    Patient's  PHQ 2 score for depression screen was negative.  Her GABY 7 score was positive at 10.  She is going to review some cognitive behavioral approaches to anxiety and when we meet in 1 month, she can discuss whether she would like to pursue any surgery.  Patient has had panic attacks in the past treated with on the spot Xanax with good results.    In 2022, the patient had COVID and a CAT scan was done identifying a lung nodule that was probably related COVID.  She had a repeat CAT scan in 2024 that was normal with full resolution of the nodule.  She is also had thyroid nodules identified by physical exam.  Several ultrasounds have been done and no concerning thyroid nodule has been found.    Patient was evaluated for chest pain in the past.  She had an exercise treadmill test which was normal.  Her EF is 65% there is some mild tricuspid regurgitation and some mild to moderate mitral regurgitation that has been identified.    A total of 50 minutes was spent rendering care for this patient.  This time included review of the patient's electronic medical record, performing the history and physical, reviewing appropriate labs and/or images, developing a treatment and assessment plan, answering patient's questions and concerns, and documenting the patient visit.  I will see the patient in 1 month for her annual exam and we can discuss her anxiety perimenopausal symptoms and weight gain.  Subjective:      Patient ID: Bryanna Cohen is a 53 y.o. female.    HPI: Well-developed and nourished pleasant 53-year-old female who is well in tune with her body.  We had an excellent discussion with regard to the management of her generalized anxiety and perimenopausal symptoms.  She is going to read more information about cognitive approaches to treating anxiety and panic.  She is satisfied with taking Xanax on an as-needed basis for overt panic.  She has not had panic in several weeks.  She consents when the panic is going to occur and  she takes her Xanax immediately which seems to be helping her.    She had been following at [facility].  She has trace tricuspid regurgitation and mild to moderate nonrheumatic mitral valve regurgitation along with a mildly dilated thoracic aorta at 3.7 cm.  She saw the specialist at [facility] because of the family connection and is not really needed for specially care at that facility.    It is important that she maintains a good blood pressure that is well-controlled to prevent any stress on her dilated thoracic aorta.  This treatment would be necessary regardless of the size of her aorta.    Patient had a lot of stress last week at work when she had to do a big presentation.  She works as an  and this is a very stressful job with a lot of pressure.  There are times when her boss yells at her.  She gets very nervous about doing a public presentation.  We talked about the potential for using propranolol for situational anxiety and she will consider this in the future.    Patient has not had a kidney stone analyzed in the past there is a family history of kidney stones.  It is presumed that the kidney stones are due to calcium oxalate.  She is going to make sure that she gets sufficient calcium and also that she has appropriate hydration.    We reviewed her ultrasound of her thyroid and there are no thyroid nodules that need to be followed.  She had a chest x-ray and CAT scan when she had COVID signifying a lung nodule that was suspected.  She had a repeat test in 2024 and the lung nodule has disappeared.    Patient has gained weight since going through menopause.  This is very upsetting to her.  We talked about using the lose it nannette.  When she comes in for her annual exam, we can talk about medication assistance if she is interested.  She did try weight watchers in the past and was not happy with this program for her.    Patient has a mild folliculitis in the left axilla.   No change in her deodorant or soap.  I gave her some bacitracin ointment to apply and ask her to use Dial soap.  This should resolve on its own without additional treatment.    Patient has a history of recurrent urinary tract infections.  She is not taking nitrofurantoin postcoital and this seems to be effective for her.    No changes in her bowel or bladder habits.      The following portions of the patient's history were reviewed and updated as appropriate: allergies, current medications, past family history, past medical history, past social history, past surgical history, and problem list.    Review of Systems no recent URI or viral syndrome.    Objective:      /67 (BP Location: Right arm, Patient Position: Sitting, Cuff Size: Standard)   Pulse 70   Temp 98.2 °F (36.8 °C) (Tympanic)   Wt 69.8 kg (153 lb 14.1 oz)   SpO2 98%   BMI 27.26 kg/m²          Physical Exam annual exam to be done in 1 month.    Reviewed vital signs.  She is normotensive with an appropriate pulse she is also afebrile.    Heart is regular rate without murmur rub or gallops.  Lungs are clear to auscultation.

## 2024-12-03 ENCOUNTER — OFFICE VISIT (OUTPATIENT)
Dept: URGENT CARE | Facility: CLINIC | Age: 54
End: 2024-12-03
Payer: COMMERCIAL

## 2024-12-03 VITALS
HEART RATE: 84 BPM | RESPIRATION RATE: 18 BRPM | TEMPERATURE: 98.7 F | DIASTOLIC BLOOD PRESSURE: 60 MMHG | SYSTOLIC BLOOD PRESSURE: 140 MMHG

## 2024-12-03 DIAGNOSIS — J02.0 STREP PHARYNGITIS: Primary | ICD-10-CM

## 2024-12-03 PROBLEM — R91.1 LUNG NODULE: Status: ACTIVE | Noted: 2022-08-23

## 2024-12-03 PROBLEM — F41.9 ANXIETY: Status: ACTIVE | Noted: 2024-02-05

## 2024-12-03 PROBLEM — N39.0 RECURRENT UTI: Status: ACTIVE | Noted: 2024-02-05

## 2024-12-03 LAB — S PYO AG THROAT QL: NEGATIVE

## 2024-12-03 PROCEDURE — 87880 STREP A ASSAY W/OPTIC: CPT

## 2024-12-03 PROCEDURE — G0382 LEV 3 HOSP TYPE B ED VISIT: HCPCS

## 2024-12-03 RX ORDER — AZITHROMYCIN 250 MG/1
TABLET, FILM COATED ORAL
Qty: 6 TABLET | Refills: 0 | Status: SHIPPED | OUTPATIENT
Start: 2024-12-03 | End: 2024-12-07

## 2024-12-03 RX ORDER — PREDNISONE 10 MG/1
TABLET ORAL
Qty: 21 TABLET | Refills: 0 | Status: SHIPPED | OUTPATIENT
Start: 2024-12-03 | End: 2024-12-09

## 2024-12-03 NOTE — PROGRESS NOTES
[facility]        NAME: Bryanna Cohen is a 53 y.o. female  : 1970    MRN: [id number]  DATE: December 3, 2024  TIME: 6:47 PM    Assessment and Plan   Strep pharyngitis [J02.0]  1. Strep pharyngitis  POCT rapid ANTIGEN strepA    azithromycin (ZITHROMAX) 250 mg tablet    predniSONE 10 mg tablet        Rapid strep - negative  Patient treated off of physical exam.  Modified Centor score of 3.    Patient Instructions       Take full course of Azithromycin as prescribed  Eat yogurt with live and active cultures and/or take a probiotic at least 3 hours before or after antibiotic dose. Monitor stool for diarrhea and/or blood. If this occurs, contact primary care doctor ASAP.   Take prescribed - take in the morning with food - start on     Boil toothbrush each night and replace after 2-3 of treatment  Fluids and rest  Salt water gargles and chloraseptic spray  Throat Coat Tea  Wash hands frequently  Don't share drinks  Tylenol/Ibuprofen for pain/fever    Follow up with PCP in 3-5 days.  Proceed to  ER if symptoms worsen.    If tests are performed, our office will contact you with results only if changes need to made to the care plan discussed with you at the visit. You can review your full results on [facility].    Chief Complaint     Chief Complaint   Patient presents with    Sore Throat     Started Saturday with fever and sore throat took a covid test on Saturday it was negative has no voice          History of Present Illness       53-year-old female arrives reporting sore throat and pain with swallowing with fevers ongoing for the past week.  Patient reports many sick contact exposures over the holiday  which was approximately 5 days ago.  Patient reports fevers at home for which she has been taking Tylenol and Motrin for with mild relief.  Reports she has lost her voice due to symptoms.  Patient reports she took a at home COVID test and it was negative this morning.   Patient denies any cough.  Patient denies any current shortness of breath, chest pain or abdominal pain.    Sore Throat   This is a new problem. The current episode started in the past 7 days. The problem has been gradually worsening. The maximum temperature recorded prior to her arrival was 101 - 101.9 F. Associated symptoms include a hoarse voice, swollen glands and trouble swallowing. Pertinent negatives include no abdominal pain, congestion, coughing, diarrhea, drooling, ear discharge, ear pain, headaches, plugged ear sensation, neck pain, shortness of breath, stridor or vomiting.       Review of Systems   Review of Systems   Constitutional:  Positive for appetite change, fatigue and fever.   HENT:  Positive for hoarse voice, sore throat, trouble swallowing and voice change. Negative for congestion, drooling, ear discharge, ear pain, sinus pressure and sinus pain.    Respiratory: Negative.  Negative for cough, shortness of breath and stridor.    Cardiovascular: Negative.    Gastrointestinal: Negative.  Negative for abdominal pain, diarrhea and vomiting.   Musculoskeletal:  Negative for neck pain.   Neurological:  Negative for headaches.         Current Medications       Current Outpatient Medications:     azithromycin (ZITHROMAX) 250 mg tablet, Take 2 tablets today then 1 tablet daily x 4 days, Disp: 6 tablet, Rfl: 0    predniSONE 10 mg tablet, Take 6 tablets (60 mg total) by mouth daily for 1 day, THEN 5 tablets (50 mg total) daily for 1 day, THEN 4 tablets (40 mg total) daily for 1 day, THEN 3 tablets (30 mg total) daily for 1 day, THEN 2 tablets (20 mg total) daily for 1 day, THEN 1 tablet (10 mg total) daily for 1 day., Disp: 21 tablet, Rfl: 0    ALPRAZolam (XANAX) 0.25 mg tablet, Take 0.25 mg by mouth daily as needed, Disp: , Rfl:     losartan (COZAAR) 50 mg tablet, Take 50 mg by mouth daily, Disp: , Rfl:     nitrofurantoin (MACROBID) 100 mg capsule, Take 50 mg by mouth as needed, Disp: , Rfl:     Current  Allergies     Allergies as of 12/03/2024 - Reviewed 12/03/2024   Allergen Reaction Noted    Cefaclor Anaphylaxis 11/25/2003    Morphine and codeine Anaphylaxis 05/12/2005    Penicillins Angioedema 11/25/2003    Sulfa antibiotics Anaphylaxis 11/25/2003    Sulfur Anaphylaxis 03/10/2016            The following portions of the patient's history were reviewed and updated as appropriate: allergies, current medications, past family history, past medical history, past social history, past surgical history and problem list.     Past Medical History:   Diagnosis Date    Anxiety     Aortic anomaly     monitored by Cardiology    BRCA1 negative     BRCA2 negative     Enlarged thoracic aorta (HCC)     Female infertility     clomid tx in past; ultimately conceived x 2 naturally    Kidney stone     MVP (mitral valve prolapse)     Urinary tract infection     Varicella        Past Surgical History:   Procedure Laterality Date    BREAST CYST ASPIRATION Bilateral 2000       Family History   Problem Relation Age of Onset    Breast cancer Mother 69    Heart attack Father     Cervical cancer Sister     No Known Problems Daughter     No Known Problems Daughter     No Known Problems Maternal Grandmother     Heart attack Maternal Grandfather     No Known Problems Paternal Grandmother     Stroke Paternal Grandfather     No Known Problems Maternal Aunt     Ovarian cancer Paternal Aunt     Breast cancer Paternal Aunt     Miscarriages / Stillbirths Paternal Aunt     Breast cancer Cousin 48    BRCA1 Negative Cousin     No Known Problems Half-Sister     Colon cancer Neg Hx     Uterine cancer Neg Hx     Breast cancer additional onset Neg Hx     Endometrial cancer Neg Hx     BRCA1 Positive Neg Hx     BRCA 1/2 Neg Hx     BRCA2 Negative Neg Hx     BRCA2 Positive Neg Hx          Medications have been verified.        Objective   /60   Pulse 84   Temp 98.7 °F (37.1 °C)   Resp 18        Physical Exam     Physical Exam  Vitals and nursing note  reviewed.   Constitutional:       General: She is not in acute distress.     Appearance: Normal appearance. She is ill-appearing.   HENT:      Head: Normocephalic.      Right Ear: Tympanic membrane, ear canal and external ear normal.      Left Ear: Tympanic membrane, ear canal and external ear normal.      Nose: Nose normal. No congestion.      Mouth/Throat:      Mouth: Mucous membranes are moist.      Pharynx: Oropharyngeal exudate and posterior oropharyngeal erythema present.      Tonsils: Tonsillar exudate present. 2+ on the right. 2+ on the left.   Eyes:      Pupils: Pupils are equal, round, and reactive to light.   Cardiovascular:      Rate and Rhythm: Normal rate and regular rhythm.      Pulses: Normal pulses.      Heart sounds: Normal heart sounds.   Pulmonary:      Effort: Pulmonary effort is normal. No respiratory distress.      Breath sounds: Normal breath sounds. No stridor. No wheezing, rhonchi or rales.   Chest:      Chest wall: No tenderness.   Musculoskeletal:         General: Normal range of motion.      Cervical back: Normal range of motion and neck supple.   Lymphadenopathy:      Cervical: Cervical adenopathy present.   Skin:     General: Skin is warm and dry.      Capillary Refill: Capillary refill takes less than 2 seconds.   Neurological:      General: No focal deficit present.      Mental Status: She is alert and oriented to person, place, and time.   Psychiatric:         Mood and Affect: Mood normal.         Behavior: Behavior normal.

## 2024-12-03 NOTE — PATIENT INSTRUCTIONS
Take full course of Azithromycin as prescribed  Eat yogurt with live and active cultures and/or take a probiotic at least 3 hours before or after antibiotic dose. Monitor stool for diarrhea and/or blood. If this occurs, contact primary care doctor ASAP.   Take prescribed - take in the morning with food - start on 12/4    Boil toothbrush each night and replace after 2-3 of treatment  Fluids and rest  Salt water gargles and chloraseptic spray  Throat Coat Tea  Wash hands frequently  Don't share drinks  Tylenol/Ibuprofen for pain/fever    Follow up with PCP in 3-5 days.  Proceed to  ER if symptoms worsen.    If tests are performed, our office will contact you with results only if changes need to made to the care plan discussed with you at the visit. You can review your full results on [facility].

## 2024-12-05 ENCOUNTER — OFFICE VISIT (OUTPATIENT)
Dept: FAMILY MEDICINE CLINIC | Facility: CLINIC | Age: 54
End: 2024-12-05
Payer: COMMERCIAL

## 2024-12-05 VITALS
BODY MASS INDEX: 27.11 KG/M2 | HEIGHT: 63 IN | WEIGHT: 153 LBS | TEMPERATURE: 97.2 F | OXYGEN SATURATION: 99 % | SYSTOLIC BLOOD PRESSURE: 128 MMHG | DIASTOLIC BLOOD PRESSURE: 74 MMHG | HEART RATE: 89 BPM

## 2024-12-05 DIAGNOSIS — J02.0 ACUTE STREPTOCOCCAL PHARYNGITIS: ICD-10-CM

## 2024-12-05 DIAGNOSIS — Z00.00 ANNUAL PHYSICAL EXAM: Primary | ICD-10-CM

## 2024-12-05 DIAGNOSIS — F41.1 GAD (GENERALIZED ANXIETY DISORDER): ICD-10-CM

## 2024-12-05 DIAGNOSIS — I10 ESSENTIAL HYPERTENSION: ICD-10-CM

## 2024-12-05 PROCEDURE — 99396 PREV VISIT EST AGE 40-64: CPT | Performed by: PHYSICIAN ASSISTANT

## 2024-12-05 PROCEDURE — 99213 OFFICE O/P EST LOW 20 MIN: CPT | Performed by: PHYSICIAN ASSISTANT

## 2024-12-05 NOTE — ASSESSMENT & PLAN NOTE
Patient seen in urgent care and diagnosed with strep pharyngitis.  Given Zithromax and steroids.

## 2024-12-05 NOTE — ASSESSMENT & PLAN NOTE
Patient going to take the losartan 50 mg at night.  Blood pressure well-controlled.  Adherent with therapy

## 2024-12-05 NOTE — ASSESSMENT & PLAN NOTE
Patient's GABY 7 score improved from a score of 10-8.  She is recently made arrangements for counseling

## 2024-12-05 NOTE — PROGRESS NOTES
Name: Bryanna Cohen      : 1970      MRN: [id number]  Encounter Provider: Danuta Palmer PA-C  Encounter Date: 2024   Encounter department: [facility]  :  Assessment & Plan  Annual physical exam  Annual physical done today.  Patient recently establish care and came back for physical exam today.       Essential hypertension  Patient going to take the losartan 50 mg at night.  Blood pressure well-controlled.  Adherent with therapy       Acute streptococcal pharyngitis  Patient seen in urgent care and diagnosed with strep pharyngitis.  Given Zithromax and steroids.       GABY (generalized anxiety disorder)  Patient's GABY 7 score improved from a score of 10-8.  She is recently made arrangements for counseling              History of Present Illness     HPI: Soon-to-be 54-year-old female seen in the office for her annual exam.  Approximately 8 days ago, she developed a significantly sore throat along with cold symptoms coughing and laryngitis.  She had significant dysphagia and was seen in urgent care on 12/3/2024.  Rapid strep test performed was negative and a throat culture was ordered.    She was given azithromycin this is day 3.  She was also given a titrating down regimen for prednisone she is on day 2 of prednisone 50 mg.  Patient states that her voice has improved.  She continues to have a lot of coryza and postnasal drainage.  She is not having abdominal pain.  She denies any rash.    Patient has decided to be in charge of her own health.  She downloaded the [employer] it nannette and is tracking her caloric intake.  She is starting to have some perimenopausal symptoms but at this point, she states that the symptoms are well-controlled and she is not interested in hormonal versus nonhormonal therapy.  She has been boiling her toothbrushes because of her presumed strep infection.  I suggested getting an expensive toothbrushes during this acute illness and throwing  "them away rather than going through the bother of boiling.    Patient states that she is symptomatically improved.  Laryngitis is gone.  She continues to have intermittent cough.  Her birthday is next week she is planning on going to [place] with her  to see their daughter.    She is not having any joint complaints.  No headaches.  She had a low-grade fever that seems to be improving.  No changes in her bowel or bladder habits.  The no chest pain or heart palpitations.  Review of Systems: Recovering from upper respiratory tract infection finishing her antibiotic.  Current Outpatient Medications on File Prior to Visit   Medication Sig Dispense Refill    azithromycin (ZITHROMAX) 250 mg tablet Take 2 tablets today then 1 tablet daily x 4 days 6 tablet 0    losartan (COZAAR) 50 mg tablet Take 50 mg by mouth daily      nitrofurantoin (MACROBID) 100 mg capsule Take 50 mg by mouth as needed      predniSONE 10 mg tablet Take 6 tablets (60 mg total) by mouth daily for 1 day, THEN 5 tablets (50 mg total) daily for 1 day, THEN 4 tablets (40 mg total) daily for 1 day, THEN 3 tablets (30 mg total) daily for 1 day, THEN 2 tablets (20 mg total) daily for 1 day, THEN 1 tablet (10 mg total) daily for 1 day. 21 tablet 0    ALPRAZolam (XANAX) 0.25 mg tablet Take 0.25 mg by mouth daily as needed       No current facility-administered medications on file prior to visit.      Social History     Tobacco Use    Smoking status: Former    Smokeless tobacco: Never    Tobacco comments:     \"20 plus years ago\"   Vaping Use    Vaping status: Never Used   Substance and Sexual Activity    Alcohol use: Yes     Comment: ocassionally    Drug use: Never    Sexual activity: Yes     Partners: Male     Birth control/protection: Male Sterilization, None        Objective   /74   Pulse 89   Temp (!) 97.2 °F (36.2 °C) (Tympanic)   Ht 5' 3\" (1.6 m)   Wt 69.4 kg (153 lb)   SpO2 99%   BMI 27.10 kg/m²      Physical Exam: Reviewed vital " signs.  She is normotensive and afebrile.  She has a nontoxic appearance.    Well-developed well-nourished 53 y.o. year old female who is cooperative with the exam.  Patient is alert and oriented x3.  Patient is appropriate in answering all questions.    HEENT:  Normocephalic.  PERRLA.  EOMs intact.  TMs are clear with identification of bony landmarks.  No tragus or pinnae tenderness.  No pre or posterior auricular adenopathy.  Sinuses without tenderness.  Throat with mild hyperemia.  Patient has no exudate at this point and tonsils are not enlarged.  Postnasal drainage is noted.  Neck:  Supple without adenopathy.  Mild tenderness on palpation of the tonsillar nodes.  Thyroid midline without thyromegaly or bruits.    Chest symmetric and nontender.  Heart regular rate and rhythm.  No murmur rubs or gallops.  Point of maximum impulse not displaced.  Lungs are clear to auscultation.  Breathing is nonlabored.  Aerating bases well.  Abdomen round and soft positive bowel sounds without masses tenderness or organomegaly.  Extremities reveal adequate peripheral pulses without peripheral edema.  Administrative Statements   I have spent a total time of 35 minutes in caring for this patient on the day of the visit/encounter including Diagnostic results, Risks and benefits of tx options, Instructions for management, Patient and family education, Impressions, Counseling / Coordination of care, Documenting in the medical record, Reviewing / ordering tests, medicine, procedures  , and Obtaining or reviewing history  .    I will see the patient in 6 months to assess her generalized anxiety and her blood pressure control.

## 2024-12-22 ENCOUNTER — OFFICE VISIT (OUTPATIENT)
Dept: URGENT CARE | Facility: CLINIC | Age: 54
End: 2024-12-22
Payer: COMMERCIAL

## 2024-12-22 VITALS
WEIGHT: 155.4 LBS | BODY MASS INDEX: 27.54 KG/M2 | SYSTOLIC BLOOD PRESSURE: 122 MMHG | OXYGEN SATURATION: 100 % | TEMPERATURE: 97.2 F | RESPIRATION RATE: 18 BRPM | HEIGHT: 63 IN | HEART RATE: 83 BPM | DIASTOLIC BLOOD PRESSURE: 61 MMHG

## 2024-12-22 DIAGNOSIS — J01.00 ACUTE MAXILLARY SINUSITIS, RECURRENCE NOT SPECIFIED: Primary | ICD-10-CM

## 2024-12-22 PROCEDURE — G0382 LEV 3 HOSP TYPE B ED VISIT: HCPCS

## 2024-12-22 RX ORDER — DOXYCYCLINE 100 MG/1
100 CAPSULE ORAL 2 TIMES DAILY
Qty: 14 CAPSULE | Refills: 0 | Status: SHIPPED | OUTPATIENT
Start: 2024-12-22 | End: 2024-12-29

## 2024-12-22 NOTE — PROGRESS NOTES
[facility]        NAME: Bryanna Cohen is a 54 y.o. female  : 1970    MRN: [id number]  DATE: 2024  TIME: 11:10 AM    Assessment and Plan   Acute maxillary sinusitis, recurrence not specified [J01.00]  1. Acute maxillary sinusitis, recurrence not specified  doxycycline monohydrate (MONODOX) 100 mg capsule            Patient Instructions   Take antibiotics as prescribed, being sure to complete full course of therapy even if you feel better!    Eat yogurt with live and active cultures and/or take a probiotic at least 3 hours before or after antibiotic dose. Monitor stool for diarrhea and/or blood. If this occurs, contact primary care doctor ASAP.       Tylenol/ibuprofen for pain/fever.    Cough/Cold Medications Made Easy!:     - Guaifenesin (Mucinex): This medication is a mucus thinning agent. It's good for congestion of the sinuses or chest, and works best if you drink plenty of fluids with it.    -Dextromethorphan (Delsym, Mucinex DM): This medication is a cough suppressant.    -Pseudoephedrine (Sudaphed): This medication is a decongestant. Patients who have high blood pressure or heart related conditions should not use this medication! Coricidin HBP is a great and safe alternative for patients with these conditions.     -Flonase: This medication is an intranasal steroid and works well for sinus congestion and postnasal drip.    -Antihistamines (Claritin, Zyrtec, Allegra, Benadryl): These medications are used for allergies, but can also be used for colds to help treat congestion and ear fullness.     - May also use saline nasal mist/sprays and VapoRub/Vix!     Follow up with PCP in 3-5 days.  Proceed to  ER if symptoms worsen.    If tests have been performed at [facility], our office will contact you with results if changes need to be made to the care plan discussed with you at the visit.  You can review your full results on [facility].    Chief Complaint     Chief Complaint    Patient presents with    Nasal Congestion     Here after Thanksgiving, was prescribed z pack and prednisone, had significant relief, but cough, sore throat, congestion, sinus pain, and low grade fever started again about 4 days ago. Has been taking motrin and cough drops with little symptom relief.          History of Present Illness       Recent sick contacts - states both of her daughters are currently sick with pneumonia & bronchitis.     URI   This is a new problem. The current episode started 1 to 4 weeks ago. The problem has been gradually worsening. There has been no fever. Associated symptoms include congestion, coughing, ear pain, headaches, a plugged ear sensation, rhinorrhea, sinus pain and a sore throat. Pertinent negatives include no abdominal pain, chest pain, diarrhea, nausea, rash or vomiting. Treatments tried: Motrin, Cough drops, Robitussin, Afrin, Coricidin, Zyrtec. The treatment provided mild relief.       Review of Systems   Review of Systems   Constitutional:  Positive for fatigue and fever.   HENT:  Positive for congestion, ear pain, postnasal drip, rhinorrhea, sinus pressure, sinus pain and sore throat. Negative for trouble swallowing.    Respiratory:  Positive for cough. Negative for chest tightness and shortness of breath.    Cardiovascular:  Negative for chest pain and palpitations.   Gastrointestinal:  Negative for abdominal pain, diarrhea, nausea and vomiting.   Musculoskeletal:  Negative for gait problem and myalgias.   Skin:  Negative for color change, pallor and rash.   Neurological:  Positive for headaches. Negative for dizziness, facial asymmetry, weakness and light-headedness.   All other systems reviewed and are negative.        Current Medications       Current Outpatient Medications:     ALPRAZolam (XANAX) 0.25 mg tablet, Take 0.25 mg by mouth daily as needed, Disp: , Rfl:     doxycycline monohydrate (MONODOX) 100 mg capsule, Take 1 capsule (100 mg total) by mouth 2 (two)  times a day for 7 days, Disp: 14 capsule, Rfl: 0    losartan (COZAAR) 50 mg tablet, Take 50 mg by mouth daily, Disp: , Rfl:     nitrofurantoin (MACROBID) 100 mg capsule, Take 50 mg by mouth as needed, Disp: , Rfl:     Current Allergies     Allergies as of 12/22/2024 - Reviewed 12/22/2024   Allergen Reaction Noted    Cefaclor Anaphylaxis 11/25/2003    Morphine and codeine Anaphylaxis 05/12/2005    Penicillins Angioedema 11/25/2003    Sulfa antibiotics Anaphylaxis 11/25/2003    Sulfur Anaphylaxis 03/10/2016            The following portions of the patient's history were reviewed and updated as appropriate: allergies, current medications, past family history, past medical history, past social history, past surgical history and problem list.     Past Medical History:   Diagnosis Date    Anxiety     Aortic anomaly     monitored by Cardiology    BRCA1 negative     BRCA2 negative     Enlarged thoracic aorta (HCC)     Female infertility     clomid tx in past; ultimately conceived x 2 naturally    Kidney stone     MVP (mitral valve prolapse)     Urinary tract infection     Varicella        Past Surgical History:   Procedure Laterality Date    BREAST CYST ASPIRATION Bilateral 2000       Family History   Problem Relation Age of Onset    Breast cancer Mother 69    Heart attack Father     Cervical cancer Sister     No Known Problems Daughter     No Known Problems Daughter     No Known Problems Maternal Grandmother     Heart attack Maternal Grandfather     No Known Problems Paternal Grandmother     Stroke Paternal Grandfather     No Known Problems Maternal Aunt     Ovarian cancer Paternal Aunt     Breast cancer Paternal Aunt     Miscarriages / Stillbirths Paternal Aunt     Breast cancer Cousin 48    BRCA1 Negative Cousin     No Known Problems Half-Sister     Colon cancer Neg Hx     Uterine cancer Neg Hx     Breast cancer additional onset Neg Hx     Endometrial cancer Neg Hx     BRCA1 Positive Neg Hx     BRCA 1/2 Neg Hx     BRCA2  "Negative Neg Hx     BRCA2 Positive Neg Hx          Medications have been verified.        Objective   /61   Pulse 83   Temp (!) 97.2 °F (36.2 °C)   Resp 18   Ht 5' 3\" (1.6 m)   Wt 70.5 kg (155 lb 6.4 oz)   SpO2 100%   BMI 27.53 kg/m²   No LMP recorded. Patient is perimenopausal.       Physical Exam     Physical Exam  Vitals and nursing note reviewed.   Constitutional:       Appearance: Normal appearance. She is ill-appearing.   HENT:      Right Ear: Ear canal and external ear normal. A middle ear effusion is present.      Left Ear: Tympanic membrane, ear canal and external ear normal.      Nose: Congestion and rhinorrhea present.      Right Sinus: Maxillary sinus tenderness present. No frontal sinus tenderness.      Left Sinus: Maxillary sinus tenderness present. No frontal sinus tenderness.      Mouth/Throat:      Mouth: Mucous membranes are moist.      Pharynx: Oropharynx is clear. No oropharyngeal exudate or posterior oropharyngeal erythema.   Eyes:      Extraocular Movements: Extraocular movements intact.      Conjunctiva/sclera: Conjunctivae normal.      Pupils: Pupils are equal, round, and reactive to light.   Cardiovascular:      Rate and Rhythm: Normal rate and regular rhythm.      Pulses: Normal pulses.      Heart sounds: Normal heart sounds.   Pulmonary:      Effort: Pulmonary effort is normal. No respiratory distress.      Breath sounds: Normal breath sounds. No stridor. No wheezing, rhonchi or rales.   Chest:      Chest wall: No tenderness.   Abdominal:      General: Abdomen is flat.      Palpations: Abdomen is soft.   Musculoskeletal:         General: Normal range of motion.      Cervical back: Normal range of motion and neck supple. No tenderness.   Lymphadenopathy:      Cervical: Cervical adenopathy present.   Skin:     General: Skin is warm and dry.      Capillary Refill: Capillary refill takes less than 2 seconds.      Coloration: Skin is not pale.      Findings: No erythema or rash. "   Neurological:      General: No focal deficit present.      Mental Status: She is alert. Mental status is at baseline.   Psychiatric:         Mood and Affect: Mood normal.         Behavior: Behavior normal.         Thought Content: Thought content normal.         Judgment: Judgment normal.

## 2024-12-22 NOTE — PATIENT INSTRUCTIONS
Take antibiotics as prescribed, being sure to complete full course of therapy even if you feel better!    Eat yogurt with live and active cultures and/or take a probiotic at least 3 hours before or after antibiotic dose. Monitor stool for diarrhea and/or blood. If this occurs, contact primary care doctor ASAP.       Tylenol/ibuprofen for pain/fever.    Cough/Cold Medications Made Easy!:     - Guaifenesin (Mucinex): This medication is a mucus thinning agent. It's good for congestion of the sinuses or chest, and works best if you drink plenty of fluids with it.    -Dextromethorphan (Delsym, Mucinex DM): This medication is a cough suppressant.    -Pseudoephedrine (Sudaphed): This medication is a decongestant. Patients who have high blood pressure or heart related conditions should not use this medication! Coricidin HBP is a great and safe alternative for patients with these conditions.     -Flonase: This medication is an intranasal steroid and works well for sinus congestion and postnasal drip.    -Antihistamines (Claritin, Zyrtec, Allegra, Benadryl): These medications are used for allergies, but can also be used for colds to help treat congestion and ear fullness.     - May also use saline nasal mist/sprays and VapoRub/Vix!     Follow up with PCP in 3-5 days.  Proceed to  ER if symptoms worsen.    If tests have been performed at [facility], our office will contact you with results if changes need to be made to the care plan discussed with you at the visit.  You can review your full results on [facility] MyChart.

## 2025-01-02 PROBLEM — N39.0 RECURRENT UTI: Status: RESOLVED | Noted: 2024-02-05 | Resolved: 2025-01-02

## 2025-01-15 DIAGNOSIS — I10 ESSENTIAL HYPERTENSION: Primary | ICD-10-CM

## 2025-01-15 RX ORDER — LOSARTAN POTASSIUM 50 MG/1
50 TABLET ORAL DAILY
Qty: 90 TABLET | Refills: 3 | Status: SHIPPED | OUTPATIENT
Start: 2025-01-15

## 2025-01-30 ENCOUNTER — OFFICE VISIT (OUTPATIENT)
Dept: FAMILY MEDICINE CLINIC | Facility: CLINIC | Age: 55
End: 2025-01-30
Payer: COMMERCIAL

## 2025-01-30 VITALS
HEART RATE: 71 BPM | BODY MASS INDEX: 27.03 KG/M2 | HEIGHT: 63 IN | WEIGHT: 152.56 LBS | OXYGEN SATURATION: 99 % | DIASTOLIC BLOOD PRESSURE: 61 MMHG | TEMPERATURE: 96.7 F | SYSTOLIC BLOOD PRESSURE: 131 MMHG

## 2025-01-30 DIAGNOSIS — F41.1 GAD (GENERALIZED ANXIETY DISORDER): ICD-10-CM

## 2025-01-30 DIAGNOSIS — I10 ESSENTIAL HYPERTENSION: ICD-10-CM

## 2025-01-30 DIAGNOSIS — T14.8XXA PULLED MUSCLE: Primary | ICD-10-CM

## 2025-01-30 PROCEDURE — 99213 OFFICE O/P EST LOW 20 MIN: CPT | Performed by: PHYSICIAN ASSISTANT

## 2025-01-30 NOTE — PROGRESS NOTES
Name: Bryanna Cohen      : 1970      MRN: [id number]  Encounter Provider: Danuta Palmer PA-C  Encounter Date: 2025   Encounter department: [facility]  :  Assessment & Plan  Pulled muscle  Patient has been working out in the gym.  She was at a meeting and turned a certain way and had immediate pain in the right upper quadrant.  Pain is worse when trying to sit up.  She has been doing sit ups.  She is also wearing her bra which irritates the area that is in pain.  Muscle pain is reproduced by her performing a sit up.  Heat is applied and is helping.       GABY (generalized anxiety disorder)  Patient has made tremendous progress in her anxiety.  Her GABY 7 score improved from 10 until 8.  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       Essential hypertension  Patient is taking losartan and blood pressure is under excellent control.              History of Present Illness   HPI: Patient has had 7 days of right upper quadrant pain.  Pain is improving.  Pain is reproduced by her lifting her head off the bed and lifting her shoulders off the bed and by doing a sit up.  Additionally, her pain under the right rib cage is at the site where her underwire bra is irritating.  She is going to change bras and wear sports bra without any underwire that would cause further irritation.  Heat is helping his pain.    No actual rash but 2 small papules noted in the right lateral thorax.  No vesicles that would be seen in the setting of shingles.  No fever or chills.    She is seeing a counselor.  Xanax was given and anticipatory situations such as giving a presentation and this worked out very well for her.  She is not taking on a regular basis.  Review of Systems: No recent URI or viral syndrome.:      Objective   /61 (BP Location: Right arm, Patient Position: Sitting, Cuff Size: Standard)   " Pulse 71   Temp (!) 96.7 °F (35.9 °C) (Tympanic)   Ht 5' 3\" (1.6 m)   Wt 69.2 kg (152 lb 8.9 oz)   SpO2 99%   BMI 27.02 kg/m²      Physical Exam:Pleasant 54-year-old female who seems much more at ease and relaxed at this time.  She was concerned about the right upper quadrant pain but it is reproducible with certain movements.  Reviewed vital signs.  She is normocephalic and afebrile.    Heart is regular rate without murmur rub or gallops.  Lungs are clear to auscultation.    Pain on palpation in the right upper quadrant along the lateral edge of the rectus abdominis muscle.  Pain is reproduced by her doing a sit up and lifting her head and shoulders off the bed.  Findings are consistent with a pulled muscle.  Administrative Statements   I have spent a total time of 20 minutes in caring for this patient on the day of the visit/encounter including Diagnostic results, Prognosis, Instructions for management, Patient and family education, Impressions, and Obtaining or reviewing history  .    I will see the patient on June 5 for her 6-month follow-up appointment.  "

## 2025-01-30 NOTE — ASSESSMENT & PLAN NOTE
Patient has made tremendous progress in her anxiety.  Her GABY 7 score improved from 10 until 8.  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

## 2025-02-24 DIAGNOSIS — Z00.6 ENCOUNTER FOR EXAMINATION FOR NORMAL COMPARISON OR CONTROL IN CLINICAL RESEARCH PROGRAM: ICD-10-CM

## 2025-03-06 ENCOUNTER — APPOINTMENT (OUTPATIENT)
Dept: LAB | Facility: HOSPITAL | Age: 55
End: 2025-03-06

## 2025-03-06 DIAGNOSIS — Z00.6 ENCOUNTER FOR EXAMINATION FOR NORMAL COMPARISON OR CONTROL IN CLINICAL RESEARCH PROGRAM: ICD-10-CM

## 2025-03-06 PROCEDURE — 36415 COLL VENOUS BLD VENIPUNCTURE: CPT

## 2025-03-17 LAB
APOB+LDLR+PCSK9 GENE MUT ANL BLD/T: NOT DETECTED
BRCA1+BRCA2 DEL+DUP + FULL MUT ANL BLD/T: NOT DETECTED
MLH1+MSH2+MSH6+PMS2 GN DEL+DUP+FUL M: NOT DETECTED

## 2025-04-09 ENCOUNTER — RX ONLY (RX ONLY)
Age: 55
End: 2025-04-09

## 2025-04-09 ENCOUNTER — DOCTOR'S OFFICE (OUTPATIENT)
Dept: URBAN - NONMETROPOLITAN AREA CLINIC 1 | Facility: CLINIC | Age: 55
Setting detail: OPHTHALMOLOGY
End: 2025-04-09
Payer: COMMERCIAL

## 2025-04-09 DIAGNOSIS — B30.9: ICD-10-CM

## 2025-04-09 DIAGNOSIS — H01.002: ICD-10-CM

## 2025-04-09 DIAGNOSIS — H01.001: ICD-10-CM

## 2025-04-09 DIAGNOSIS — H40.013: ICD-10-CM

## 2025-04-09 PROCEDURE — 92014 COMPRE OPH EXAM EST PT 1/>: CPT | Performed by: OPTOMETRIST

## 2025-04-09 ASSESSMENT — CONFRONTATIONAL VISUAL FIELD TEST (CVF)
OD_FINDINGS: FULL
OS_FINDINGS: FULL

## 2025-04-09 ASSESSMENT — REFRACTION_CURRENTRX
OD_OVR_VA: 20/
OS_SPHERE: +0.50
OD_VPRISM_DIRECTION: PROGS
OD_ADD: +1.75
OS_VPRISM_DIRECTION: PROGS
OD_SPHERE: +0.75
OS_OVR_VA: 20/
OD_AXIS: 161
OS_AXIS: 154
OD_CYLINDER: -1.75
OS_CYLINDER: -1.00

## 2025-04-09 ASSESSMENT — PACHYMETRY
OD_CT_CORRECTION: -1
OD_CT_UM: 569
OS_CT_UM: 560
OS_CT_CORRECTION: -1

## 2025-04-09 ASSESSMENT — KERATOMETRY
OS_K2POWER_DIOPTERS: 44.75
OD_K2POWER_DIOPTERS: 44.25
OD_K1POWER_DIOPTERS: 43.50
OD_AXISANGLE_DEGREES: 171
OS_AXISANGLE_DEGREES: 011
OS_K1POWER_DIOPTERS: 43.00

## 2025-04-09 ASSESSMENT — REFRACTION_AUTOREFRACTION
OD_CYLINDER: -1.50
OS_SPHERE: +0.50
OS_CYLINDER: -1.50
OS_AXIS: 014
OD_SPHERE: +0.50
OD_AXIS: 159

## 2025-04-09 ASSESSMENT — DRY EYES - PHYSICIAN NOTES: OD_GENERALCOMMENTS: SCATTERED

## 2025-04-09 ASSESSMENT — REFRACTION_MANIFEST
OS_VA2: 20/20
OS_AXIS: 015
OD_VA2: 20/20
OD_ADD: +2.50
OS_CYLINDER: -1.50
OD_VA1: 20/20
OD_SPHERE: +0.25
OS_SPHERE: +0.50
OD_CYLINDER: -1.50
OS_VA1: 20/20
OS_ADD: +2.50
OD_AXIS: 165

## 2025-04-09 ASSESSMENT — LID EXAM ASSESSMENTS
OD_BLEPHARITIS: T
OS_BLEPHARITIS: T
OD_ANGULAR_BLEPHARITIS: RLL RUL T 1+

## 2025-04-09 ASSESSMENT — VISUAL ACUITY
OD_BCVA: 20/20-2
OS_BCVA: 20/20-2

## 2025-04-09 ASSESSMENT — TONOMETRY
OD_IOP_MMHG: 18
OS_IOP_MMHG: 20

## 2025-04-09 ASSESSMENT — SUPERFICIAL PUNCTATE KERATITIS (SPK): OD_SPK: T 1+

## 2025-04-18 ENCOUNTER — DOCTOR'S OFFICE (OUTPATIENT)
Dept: URBAN - NONMETROPOLITAN AREA CLINIC 1 | Facility: CLINIC | Age: 55
Setting detail: OPHTHALMOLOGY
End: 2025-04-18
Payer: COMMERCIAL

## 2025-04-18 DIAGNOSIS — H40.013: ICD-10-CM

## 2025-04-18 DIAGNOSIS — H01.002: ICD-10-CM

## 2025-04-18 DIAGNOSIS — H01.001: ICD-10-CM

## 2025-04-18 DIAGNOSIS — B30.9: ICD-10-CM

## 2025-04-18 PROCEDURE — 92083 EXTENDED VISUAL FIELD XM: CPT | Performed by: OPTOMETRIST

## 2025-04-18 PROCEDURE — 92014 COMPRE OPH EXAM EST PT 1/>: CPT | Performed by: OPTOMETRIST

## 2025-04-18 PROCEDURE — 76514 ECHO EXAM OF EYE THICKNESS: CPT | Performed by: OPTOMETRIST

## 2025-04-18 ASSESSMENT — VISUAL ACUITY
OD_BCVA: 20/20-2
OS_BCVA: 20/20-2

## 2025-04-18 ASSESSMENT — KERATOMETRY
OS_K1POWER_DIOPTERS: 43.00
OD_AXISANGLE_DEGREES: 171
OS_K2POWER_DIOPTERS: 44.75
OD_K1POWER_DIOPTERS: 43.50
OS_AXISANGLE_DEGREES: 011
OD_K2POWER_DIOPTERS: 44.25

## 2025-04-18 ASSESSMENT — REFRACTION_CURRENTRX
OS_CYLINDER: -1.00
OD_OVR_VA: 20/
OD_ADD: +1.75
OS_VPRISM_DIRECTION: PROGS
OS_SPHERE: +0.50
OD_SPHERE: +0.75
OD_CYLINDER: -1.75
OD_VPRISM_DIRECTION: PROGS
OS_OVR_VA: 20/
OS_AXIS: 154
OD_AXIS: 161

## 2025-04-18 ASSESSMENT — REFRACTION_MANIFEST
OD_CYLINDER: -1.50
OS_VA2: 20/20
OS_ADD: +2.50
OD_VA2: 20/20
OS_SPHERE: +0.50
OD_VA1: 20/20
OS_AXIS: 015
OS_VA1: 20/20
OD_ADD: +2.50
OS_CYLINDER: -1.50
OD_SPHERE: +0.25
OD_AXIS: 165

## 2025-04-18 ASSESSMENT — CONFRONTATIONAL VISUAL FIELD TEST (CVF)
OS_FINDINGS: FULL
OD_FINDINGS: FULL

## 2025-04-18 ASSESSMENT — PACHYMETRY
OS_CT_UM: 530
OD_CT_CORRECTION: 1
OS_CT_CORRECTION: 1
OD_CT_UM: 530

## 2025-04-18 ASSESSMENT — REFRACTION_AUTOREFRACTION
OD_AXIS: 173
OS_CYLINDER: -1.50
OD_SPHERE: +0.75
OS_SPHERE: +0.50
OD_CYLINDER: -1.25
OS_AXIS: 021

## 2025-04-18 ASSESSMENT — LID EXAM ASSESSMENTS
OS_BLEPHARITIS: T
OD_BLEPHARITIS: T
OD_ANGULAR_BLEPHARITIS: RLL RUL T 1+

## 2025-04-18 ASSESSMENT — TONOMETRY
OD_IOP_MMHG: 16
OS_IOP_MMHG: 16

## 2025-04-18 ASSESSMENT — SUPERFICIAL PUNCTATE KERATITIS (SPK): OD_SPK: T 1+

## 2025-04-18 ASSESSMENT — DRY EYES - PHYSICIAN NOTES: OD_GENERALCOMMENTS: SCATTERED

## 2025-04-26 ENCOUNTER — OFFICE VISIT (OUTPATIENT)
Dept: URGENT CARE | Facility: CLINIC | Age: 55
End: 2025-04-26
Payer: COMMERCIAL

## 2025-04-26 VITALS
WEIGHT: 150 LBS | TEMPERATURE: 99.9 F | HEART RATE: 86 BPM | HEIGHT: 63 IN | RESPIRATION RATE: 18 BRPM | BODY MASS INDEX: 26.58 KG/M2 | DIASTOLIC BLOOD PRESSURE: 68 MMHG | OXYGEN SATURATION: 98 % | SYSTOLIC BLOOD PRESSURE: 124 MMHG

## 2025-04-26 DIAGNOSIS — J06.9 VIRAL UPPER RESPIRATORY INFECTION: Primary | ICD-10-CM

## 2025-04-26 PROCEDURE — G0382 LEV 3 HOSP TYPE B ED VISIT: HCPCS | Performed by: NURSE PRACTITIONER

## 2025-04-26 RX ORDER — BROMPHENIRAMINE MALEATE, PSEUDOEPHEDRINE HYDROCHLORIDE, AND DEXTROMETHORPHAN HYDROBROMIDE 2; 30; 10 MG/5ML; MG/5ML; MG/5ML
10 SYRUP ORAL 3 TIMES DAILY PRN
Qty: 150 ML | Refills: 0 | Status: SHIPPED | OUTPATIENT
Start: 2025-04-26

## 2025-04-26 RX ORDER — AZITHROMYCIN 250 MG/1
TABLET, FILM COATED ORAL
Qty: 6 TABLET | Refills: 0 | Status: SHIPPED | OUTPATIENT
Start: 2025-04-26 | End: 2025-04-30

## 2025-04-26 NOTE — PROGRESS NOTES
[facility]        NAME: Bryanna Cohen is a 54 y.o. female  : 1970    MRN: [id number]  DATE: 2025  TIME: 10:48 AM    Assessment and Plan   Viral upper respiratory infection [J06.9]  1. Viral upper respiratory infection  azithromycin (ZITHROMAX) 250 mg tablet    brompheniramine-pseudoephedrine-DM 30-2-10 MG/5ML syrup            Patient Instructions     Antibiotics not indicated at this time  However given travel abroad patient has been given azithromycin  Bromfed also sent to pharmacy  Take medications as prescribed  Follow up with PCP in 3-5 days.  Proceed to  ER if symptoms worsen.    If tests have been performed at [facility], our office will contact you with results if changes need to be made to the care plan discussed with you at the visit.  [facility]    Chief Complaint     Chief Complaint   Patient presents with    Nasal Congestion    Cough     Symptoms started 2 days ago    Earache         History of Present Illness       HPI  Presents to clinic with complaint of nose congestion, cough, earache, runny nose and headaches.  Duration 2 days.  States she is traveling to [place] tomorrow in the morning and will be gone for about a week.  She is concerned that if things get worse she may not be able to get adequate medical care while at there      Review of Systems   Review of Systems   Constitutional:  Negative for fever.   HENT:  Positive for congestion, ear pain, rhinorrhea and sore throat.    Respiratory:  Positive for cough. Negative for wheezing.    Cardiovascular:  Negative for chest pain.   Gastrointestinal:  Negative for abdominal pain, diarrhea and vomiting.   Neurological:  Positive for headaches.         Current Medications       Current Outpatient Medications:     ALPRAZolam (XANAX) 0.25 mg tablet, Take 0.25 mg by mouth daily as needed, Disp: , Rfl:     azithromycin (ZITHROMAX) 250 mg tablet, Take 2 tablets today then 1  tablet daily x 4 days, Disp: 6 tablet, Rfl: 0    brompheniramine-pseudoephedrine-DM 30-2-10 MG/5ML syrup, Take 10 mL by mouth 3 (three) times a day as needed for cough or congestion, Disp: 150 mL, Rfl: 0    losartan (COZAAR) 50 mg tablet, Take 1 tablet (50 mg total) by mouth daily, Disp: 90 tablet, Rfl: 3    nitrofurantoin (MACROBID) 100 mg capsule, Take 50 mg by mouth as needed, Disp: , Rfl:     Current Allergies     Allergies as of 04/26/2025 - Reviewed 04/26/2025   Allergen Reaction Noted    Cefaclor Anaphylaxis 11/25/2003    Morphine and codeine Anaphylaxis 05/12/2005    Penicillins Angioedema 11/25/2003    Sulfa antibiotics Anaphylaxis 11/25/2003    Sulfur Anaphylaxis 03/10/2016            The following portions of the patient's history were reviewed and updated as appropriate: allergies, current medications, past family history, past medical history, past social history, past surgical history and problem list.     Past Medical History:   Diagnosis Date    Anxiety     Aortic anomaly     monitored by Cardiology    BRCA1 negative     BRCA2 negative     Enlarged thoracic aorta (HCC)     Female infertility     clomid tx in past; ultimately conceived x 2 naturally    Kidney stone     MVP (mitral valve prolapse)     Urinary tract infection     Varicella        Past Surgical History:   Procedure Laterality Date    BREAST CYST ASPIRATION Bilateral 2000       Family History   Problem Relation Age of Onset    Breast cancer Mother 69    Heart attack Father     Cervical cancer Sister     No Known Problems Daughter     No Known Problems Daughter     No Known Problems Maternal Grandmother     Heart attack Maternal Grandfather     No Known Problems Paternal Grandmother     Stroke Paternal Grandfather     No Known Problems Maternal Aunt     Ovarian cancer Paternal Aunt     Breast cancer Paternal Aunt     Miscarriages / Stillbirths Paternal Aunt     Breast cancer Cousin 48    BRCA1 Negative Cousin     No Known Problems  "Half-Sister     Colon cancer Neg Hx     Uterine cancer Neg Hx     Breast cancer additional onset Neg Hx     Endometrial cancer Neg Hx     BRCA1 Positive Neg Hx     BRCA 1/2 Neg Hx     BRCA2 Negative Neg Hx     BRCA2 Positive Neg Hx          Medications have been verified.        Objective   /68   Pulse 86   Temp 99.9 °F (37.7 °C)   Resp 18   Ht 5' 3\" (1.6 m)   Wt 68 kg (150 lb)   LMP 07/01/2023 (Approximate)   SpO2 98%   BMI 26.57 kg/m²   Patient's last menstrual period was 07/01/2023 (approximate).       Physical Exam     Physical Exam  Constitutional:       General: She is not in acute distress.     Appearance: She is not ill-appearing.   HENT:      Head:      Comments: Reports increased pressure with palpation of the maxillary and frontal sinuses     Right Ear: Tympanic membrane normal.      Left Ear: Tympanic membrane normal.      Nose: Congestion and rhinorrhea present.      Mouth/Throat:      Pharynx: No posterior oropharyngeal erythema.   Cardiovascular:      Rate and Rhythm: Regular rhythm.      Heart sounds: Normal heart sounds.   Pulmonary:      Effort: Pulmonary effort is normal.      Breath sounds: Normal breath sounds.   Musculoskeletal:      Cervical back: No tenderness.                   "

## 2025-05-09 ENCOUNTER — DOCTOR'S OFFICE (OUTPATIENT)
Dept: URBAN - NONMETROPOLITAN AREA CLINIC 1 | Facility: CLINIC | Age: 55
Setting detail: OPHTHALMOLOGY
End: 2025-05-09
Payer: COMMERCIAL

## 2025-05-09 DIAGNOSIS — H01.001: ICD-10-CM

## 2025-05-09 DIAGNOSIS — H40.013: ICD-10-CM

## 2025-05-09 DIAGNOSIS — H01.004: ICD-10-CM

## 2025-05-09 DIAGNOSIS — H25.13: ICD-10-CM

## 2025-05-09 PROCEDURE — 92014 COMPRE OPH EXAM EST PT 1/>: CPT | Performed by: OPTOMETRIST

## 2025-05-09 ASSESSMENT — REFRACTION_CURRENTRX
OD_VPRISM_DIRECTION: PROGS
OS_ADD: +2.50
OD_AXIS: 177
OS_SPHERE: +0.50
OS_VPRISM_DIRECTION: PROGS
OD_CYLINDER: -1.25
OD_ADD: +2.50
OS_OVR_VA: 20/
OD_OVR_VA: 20/
OS_AXIS: 6
OS_CYLINDER: -1.25
OD_SPHERE: +0.25

## 2025-05-09 ASSESSMENT — PACHYMETRY
OD_CT_UM: 530
OS_CT_UM: 530
OD_CT_CORRECTION: 1
OS_CT_CORRECTION: 1

## 2025-05-09 ASSESSMENT — CONFRONTATIONAL VISUAL FIELD TEST (CVF)
OD_FINDINGS: FULL
OS_FINDINGS: FULL

## 2025-05-09 ASSESSMENT — REFRACTION_MANIFEST
OS_VA2: 20/20
OS_AXIS: 015
OS_SPHERE: +0.50
OD_VA1: 20/20
OD_SPHERE: +0.75
OS_ADD: +2.50
OD_ADD: +2.50
OD_CYLINDER: -1.25
OS_VA1: 20/20
OD_AXIS: 165
OD_VA2: 20/20
OS_CYLINDER: -1.50

## 2025-05-09 ASSESSMENT — KERATOMETRY
OD_K1POWER_DIOPTERS: 43.50
OS_K2POWER_DIOPTERS: 44.75
OD_K2POWER_DIOPTERS: 44.25
OS_K1POWER_DIOPTERS: 43.00
OD_AXISANGLE_DEGREES: 171
OS_AXISANGLE_DEGREES: 011

## 2025-05-09 ASSESSMENT — REFRACTION_AUTOREFRACTION
OS_CYLINDER: -1.50
OD_SPHERE: +1.00
OD_AXIS: 172
OS_SPHERE: +0.50
OS_AXIS: 13
OD_CYLINDER: -1.25

## 2025-05-09 ASSESSMENT — TONOMETRY
OS_IOP_MMHG: 16
OD_IOP_MMHG: 16

## 2025-05-09 ASSESSMENT — LID EXAM ASSESSMENTS
OS_BLEPHARITIS: T
OD_BLEPHARITIS: T

## 2025-05-09 ASSESSMENT — VISUAL ACUITY
OS_BCVA: 20/20-2
OD_BCVA: 20/20

## 2025-05-25 ENCOUNTER — OFFICE VISIT (OUTPATIENT)
Dept: URGENT CARE | Facility: CLINIC | Age: 55
End: 2025-05-25
Payer: COMMERCIAL

## 2025-05-25 VITALS
TEMPERATURE: 97.9 F | HEIGHT: 63 IN | WEIGHT: 158.2 LBS | BODY MASS INDEX: 28.03 KG/M2 | SYSTOLIC BLOOD PRESSURE: 138 MMHG | HEART RATE: 90 BPM | OXYGEN SATURATION: 96 % | DIASTOLIC BLOOD PRESSURE: 84 MMHG | RESPIRATION RATE: 18 BRPM

## 2025-05-25 DIAGNOSIS — J20.8 ACUTE BACTERIAL BRONCHITIS: Primary | ICD-10-CM

## 2025-05-25 DIAGNOSIS — B96.89 ACUTE BACTERIAL BRONCHITIS: Primary | ICD-10-CM

## 2025-05-25 PROCEDURE — G0382 LEV 3 HOSP TYPE B ED VISIT: HCPCS

## 2025-05-25 RX ORDER — METHYLPREDNISOLONE 4 MG/1
TABLET ORAL
Qty: 1 EACH | Refills: 0 | Status: SHIPPED | OUTPATIENT
Start: 2025-05-25 | End: 2025-06-05 | Stop reason: ALTCHOICE

## 2025-05-25 RX ORDER — DEXTROMETHORPHAN HYDROBROMIDE AND PROMETHAZINE HYDROCHLORIDE 15; 6.25 MG/5ML; MG/5ML
5 SYRUP ORAL 4 TIMES DAILY PRN
Qty: 118 ML | Refills: 0 | Status: SHIPPED | OUTPATIENT
Start: 2025-05-25 | End: 2025-06-05 | Stop reason: ALTCHOICE

## 2025-05-25 RX ORDER — AZITHROMYCIN 250 MG/1
TABLET, FILM COATED ORAL
Qty: 6 TABLET | Refills: 0 | Status: SHIPPED | OUTPATIENT
Start: 2025-05-25 | End: 2025-05-29

## 2025-05-25 NOTE — PROGRESS NOTES
[facility]        NAME: Bryanna Cohen is a 54 y.o. female  : 1970    MRN: [id number]  DATE: May 25, 2025  TIME: 2:26 PM    Assessment and Plan   Acute bacterial bronchitis [J20.8, B96.89]  1. Acute bacterial bronchitis  azithromycin (ZITHROMAX) 250 mg tablet    methylPREDNISolone 4 MG tablet therapy pack    promethazine-dextromethorphan (PHENERGAN-DM) 6.25-15 mg/5 mL oral syrup        Plan to treat with course of oral antibiotic as well as oral steroid at this time based on physical examination.  Patient has severe allergy to penicillins and also reports having allergic reaction to doxycycline in the past, so plan to treat with azithromycin at this time.  Discussed obtaining x-ray imaging at this time however patient declined.  Phenergan for management of cough.  ED precautions discussed with patient. Tylenol for pain/fever. Increased fluids & rest. May continue with other OTC and supportive therapies at home. Patient in agreement with plan & verbalized understanding.       Patient Instructions   Take antibiotics as prescribed, being sure to complete full course of therapy even if you feel better!    Eat yogurt with live and active cultures and/or take a probiotic at least 3 hours before or after antibiotic dose. Monitor stool for diarrhea and/or blood. If this occurs, contact primary care doctor ASAP.       Take course of steroids as prescribed. Be sure to take with food! Recommended to take in the morning, as taking this medication later in the day may cause sleep disturbance (keeping you awake). If diabetic, be sure to monitor your blood glucose levels while on this medication and notify PCP if levels become too elevated.  Avoiding ibuprofen containing products once steroid therapy.    Increase fluids and rest.  Tylenol for pain/fever.  Phenergan for management of cough.    Should he develop difficulty breathing or chest pain, proceed to the ED for further evaluation.    Follow up with PCP  "in 3-5 days.  Proceed to  ER if symptoms worsen.    If tests have been performed at [facility], our office will contact you with results if changes need to be made to the care plan discussed with you at the visit.  [facility]    Chief Complaint     Chief Complaint   Patient presents with    Cold Like Symptoms     Visit about a month ago. Symptoms worsened - severe cough and pain in chest from coughing.          History of Present Illness       Patient is a 54-year-old female who presents today for evaluation of cold-like symptoms.  She reports that her cough is most concerning, as it is causing significant discomfort causing her to \" brace herself\" while coughing due to the pain.  She reports that her symptoms are ongoing for over 1 month.  She was evaluated in this clinic on 4/26/2025 and prescribed a Z-Estuardo as well as Bromfed at that time.  She states that she did take the Z-Estuardo without significant relief of symptoms.  She reports concern for pneumonia, stating that this feels similar to when she has had pneumonia in the past.    URI   This is a new problem. The current episode started more than 1 month ago. The problem has been gradually worsening. There has been no fever. Associated symptoms include coughing (painful coughing & productive). Pertinent negatives include no abdominal pain, chest pain, congestion, diarrhea, ear pain, headaches, nausea, plugged ear sensation, rash, rhinorrhea, sinus pain, sore throat or vomiting. Treatments tried: zpack, bromfed, robitussin. The treatment provided mild relief.       Review of Systems   Review of Systems   Constitutional:  Positive for fatigue. Negative for chills and fever.   HENT:  Positive for postnasal drip. Negative for congestion, ear pain, rhinorrhea, sinus pressure, sinus pain and sore throat.    Respiratory:  Positive for cough (painful coughing & productive), chest tightness (pain with coughing & breathing) and " "shortness of breath (with activity).    Cardiovascular:  Negative for chest pain and palpitations.   Gastrointestinal:  Negative for abdominal pain, diarrhea, nausea and vomiting.   Musculoskeletal:  Negative for gait problem and myalgias.   Skin:  Negative for color change, pallor and rash.   Neurological:  Negative for dizziness, weakness, light-headedness and headaches.   All other systems reviewed and are negative.        Current Medications     Current Medications[1]    Current Allergies     Allergies as of 05/25/2025 - Reviewed 05/25/2025   Allergen Reaction Noted    Cefaclor Anaphylaxis 11/25/2003    Morphine and codeine Anaphylaxis 05/12/2005    Penicillins Angioedema 11/25/2003    Sulfa antibiotics Anaphylaxis 11/25/2003    Sulfur Anaphylaxis 03/10/2016            The following portions of the patient's history were reviewed and updated as appropriate: allergies, current medications, past family history, past medical history, past social history, past surgical history and problem list.     Past Medical History[2]    Past Surgical History[3]    Family History[4]      Medications have been verified.        Objective   /84   Pulse 90   Temp 97.9 °F (36.6 °C)   Resp 18   Ht 5' 3\" (1.6 m)   Wt 71.8 kg (158 lb 3.2 oz)   LMP 07/01/2023 (Approximate)   SpO2 96%   BMI 28.02 kg/m²   Patient's last menstrual period was 07/01/2023 (approximate).       Physical Exam     Physical Exam  Vitals and nursing note reviewed.   Constitutional:       General: She is not in acute distress.     Appearance: Normal appearance. She is ill-appearing. She is not toxic-appearing or diaphoretic.   HENT:      Head: Normocephalic and atraumatic.      Right Ear: Tympanic membrane, ear canal and external ear normal.      Left Ear: Tympanic membrane, ear canal and external ear normal.      Nose: Nose normal. No congestion or rhinorrhea.      Mouth/Throat:      Lips: Pink.      Mouth: Mucous membranes are moist.      Pharynx: " Oropharynx is clear. Uvula midline. No pharyngeal swelling, oropharyngeal exudate, posterior oropharyngeal erythema, uvula swelling or postnasal drip.     Eyes:      Extraocular Movements: Extraocular movements intact.      Conjunctiva/sclera: Conjunctivae normal.      Pupils: Pupils are equal, round, and reactive to light.       Cardiovascular:      Rate and Rhythm: Normal rate and regular rhythm.      Pulses: Normal pulses.      Heart sounds: Normal heart sounds.   Pulmonary:      Effort: Pulmonary effort is normal. No respiratory distress.      Breath sounds: No stridor. Examination of the right-lower field reveals rhonchi. Examination of the left-lower field reveals rhonchi. Rhonchi present. No wheezing or rales.      Comments: Mild rales noted to bilateral bases.   Chest:      Chest wall: No tenderness.   Abdominal:      General: Bowel sounds are normal.     Musculoskeletal:         General: Normal range of motion.      Cervical back: Normal range of motion and neck supple. No tenderness.   Lymphadenopathy:      Cervical: No cervical adenopathy.     Skin:     General: Skin is warm and dry.      Capillary Refill: Capillary refill takes less than 2 seconds.      Coloration: Skin is not pale.      Findings: No erythema or rash.     Neurological:      General: No focal deficit present.      Mental Status: She is alert. Mental status is at baseline.     Psychiatric:         Mood and Affect: Mood normal.         Behavior: Behavior normal.         Thought Content: Thought content normal.         Judgment: Judgment normal.                        [1]   Current Outpatient Medications:     ALPRAZolam (XANAX) 0.25 mg tablet, Take 0.25 mg by mouth daily as needed, Disp: , Rfl:     losartan (COZAAR) 50 mg tablet, Take 1 tablet (50 mg total) by mouth daily, Disp: 90 tablet, Rfl: 3    nitrofurantoin (MACROBID) 100 mg capsule, Take 50 mg by mouth as needed, Disp: , Rfl:     azithromycin (ZITHROMAX) 250 mg tablet, Take 2 tablets  today then 1 tablet daily x 4 days, Disp: 6 tablet, Rfl: 0    methylPREDNISolone 4 MG tablet therapy pack, Use as directed on package, Disp: 1 each, Rfl: 0    promethazine-dextromethorphan (PHENERGAN-DM) 6.25-15 mg/5 mL oral syrup, Take 5 mL by mouth 4 (four) times a day as needed for cough, Disp: 118 mL, Rfl: 0  [2]   Past Medical History:  Diagnosis Date    Anxiety     Aortic anomaly     monitored by Cardiology    BRCA1 negative     BRCA2 negative     Enlarged thoracic aorta (HCC)     Female infertility     clomid tx in past; ultimately conceived x 2 naturally    Kidney stone     MVP (mitral valve prolapse)     Urinary tract infection     Varicella    [3]   Past Surgical History:  Procedure Laterality Date    BREAST CYST ASPIRATION Bilateral 2000   [4]   Family History  Problem Relation Name Age of Onset    Breast cancer Mother  69    Heart attack Father Leonid     Cervical cancer Sister      No Known Problems Daughter merlin     No Known Problems Daughter alba     No Known Problems Maternal Grandmother      Heart attack Maternal Grandfather Tony     No Known Problems Paternal Grandmother      Stroke Paternal Grandfather Anirudh     No Known Problems Maternal Aunt janet     Ovarian cancer Paternal Aunt osmel     Breast cancer Paternal Aunt montserrat     Miscarriages / Stillbirths Paternal Aunt montserrat     Breast cancer Cousin donita 48    BRCA1 Negative Cousin donita     No Known Problems Half-Sister monse     Colon cancer Neg Hx      Uterine cancer Neg Hx      Breast cancer additional onset Neg Hx      Endometrial cancer Neg Hx      BRCA1 Positive Neg Hx      BRCA 1/2 Neg Hx      BRCA2 Negative Neg Hx      BRCA2 Positive Neg Hx

## 2025-05-25 NOTE — PATIENT INSTRUCTIONS
Take antibiotics as prescribed, being sure to complete full course of therapy even if you feel better!    Eat yogurt with live and active cultures and/or take a probiotic at least 3 hours before or after antibiotic dose. Monitor stool for diarrhea and/or blood. If this occurs, contact primary care doctor ASAP.       Take course of steroids as prescribed. Be sure to take with food! Recommended to take in the morning, as taking this medication later in the day may cause sleep disturbance (keeping you awake). If diabetic, be sure to monitor your blood glucose levels while on this medication and notify PCP if levels become too elevated.  Avoiding ibuprofen containing products once steroid therapy.    Increase fluids and rest.  Tylenol for pain/fever.  Phenergan for management of cough.    Should he develop difficulty breathing or chest pain, proceed to the ED for further evaluation.    Follow up with PCP in 3-5 days.  Proceed to  ER if symptoms worsen.    If tests have been performed at [facility], our office will contact you with results if changes need to be made to the care plan discussed with you at the visit.  You can review your full results on [facility].

## 2025-05-29 ENCOUNTER — OFFICE VISIT (OUTPATIENT)
Dept: URGENT CARE | Facility: CLINIC | Age: 55
End: 2025-05-29
Payer: COMMERCIAL

## 2025-05-29 ENCOUNTER — RESULTS FOLLOW-UP (OUTPATIENT)
Dept: URGENT CARE | Facility: CLINIC | Age: 55
End: 2025-05-29

## 2025-05-29 ENCOUNTER — APPOINTMENT (OUTPATIENT)
Dept: RADIOLOGY | Facility: CLINIC | Age: 55
End: 2025-05-29
Payer: COMMERCIAL

## 2025-05-29 VITALS
BODY MASS INDEX: 28.49 KG/M2 | DIASTOLIC BLOOD PRESSURE: 82 MMHG | HEIGHT: 63 IN | SYSTOLIC BLOOD PRESSURE: 139 MMHG | TEMPERATURE: 97.1 F | WEIGHT: 160.8 LBS | RESPIRATION RATE: 16 BRPM | HEART RATE: 68 BPM | OXYGEN SATURATION: 99 %

## 2025-05-29 DIAGNOSIS — R07.89 CHEST WALL PAIN: ICD-10-CM

## 2025-05-29 DIAGNOSIS — R05.1 ACUTE COUGH: Primary | ICD-10-CM

## 2025-05-29 DIAGNOSIS — R05.1 ACUTE COUGH: ICD-10-CM

## 2025-05-29 PROCEDURE — G0383 LEV 4 HOSP TYPE B ED VISIT: HCPCS

## 2025-05-29 PROCEDURE — 71046 X-RAY EXAM CHEST 2 VIEWS: CPT

## 2025-05-29 NOTE — PATIENT INSTRUCTIONS
Recommend ice and heat as needed.  May use topical analgesics such as IcyHot for relief.  Ibuprofen 600-800mg + Tylenol 1000mg every 6 hours. Do not exceed this amount.

## 2025-05-29 NOTE — PROGRESS NOTES
[facility]  Name: Bryanna Cohen      : 1970      MRN: [id number]  Encounter Provider: Anoop Tapia PA-C  Encounter Date: 2025   Encounter department: [facility] [place]  :  Assessment & Plan  Acute cough    Orders:    XR chest pa and lateral; Future    Chest wall pain    Orders:    XR chest pa and lateral; Future    Chest x-ray shows no acute abnormality at this time.  Lung markings appear to be going to the end without any sign of pneumothorax this provider.  No consolidations largely apparent.  Patient was on 2 rounds of antibiotics, currently still on 1 and then even on a steroid taper.  Cough is improving.  Plan recommendation at this time without any sign of pneumothorax or pneumonia to manage pain control until symptoms resolved.  Recommend strict cautions to directly go to the ER should symptoms worsen such as shortness of breath, worsening chest pain, or difficulty breathing.    Today reviewed chart from patient previous visit  Patient Instructions  Follow up with PCP in 3-5 days.  Proceed to  ER if symptoms worsen.    If tests are performed, our office will contact you with results only if changes need to made to the care plan discussed with you at the visit. [facility]    Chief Complaint:   Chief Complaint   Patient presents with    Chest Pain     Right sided chest and back pain starting last Saturday. States she was seen for bronchitis, was offered xr declined at the time and has returned for chest XR. Pain in chest and back is a 9/10 when coughing. Currently taking z-pack, steroids, and cough suppressant.      History of Present Illness   54-year-old female presenting with persistent right sided chest wall pain when coughing x 1 month.  Patient was seen about 4 days ago and was diagnosed with bronchitis.  At the time of her visit she was coughing for about 1 month and having right sided chest wall pain whenever coughing.   She was prescribed promethazine, prednisone taper as well as azithromycin.  Previously when she was at the start of her symptoms she was even given azithromycin but the cough persisted.  Declined a chest x-ray at that time.  Patient presents about 4 days later stating that the cough frequency has improved where she is only coughing about 5 or 6 times a day but the chest wall pain is persistent.  States that she notices it there whenever she coughs or takes very very deep breaths as well as lays on her right side when she sleeps.  It is reproducible to touch.  Denies any numbness or tingling.  Aside from the cough and the chest wall pain her symptoms have resolved aside from some minor postnasal drip stating she no longer has any of those acute cold symptoms.  Feels like her chest is tight but denies any shortness of breath.  States that the pain is mainly sharp whenever she coughs.  Denies any specific injury to the area, however fell off a ladder within a week after chest wall pain started but no acute worsening or pain differences after the fall.  Denies any head strike or loss of conscious.  Now requesting chest x-ray concern for pneumonia.  Denies any palpitations, true deeper chest wall pain, lightheadedness or dizziness, fevers, chills or bodyaches nausea vomiting or diarrhea.  Did have sick contacts at the start of symptoms but but no more sick contacts.    Chest Pain   Associated symptoms include a cough. Pertinent negatives include no diaphoresis, dizziness, fever, headaches, nausea, palpitations or vomiting.         Review of Systems   Constitutional:  Negative for chills, diaphoresis, fatigue and fever.   HENT:  Positive for postnasal drip. Negative for congestion, ear discharge, ear pain, rhinorrhea, sinus pressure and sore throat.    Respiratory:  Positive for cough and chest tightness. Negative for choking.    Cardiovascular:  Positive for chest pain. Negative for palpitations.   Gastrointestinal:   "Negative for diarrhea, nausea and vomiting.   Musculoskeletal:  Negative for arthralgias and myalgias.   Skin:  Negative for color change.   Neurological:  Negative for dizziness, light-headedness and headaches.     Past Medical History   Past Medical History[1]  Past Surgical History[2]  Family History[3]  she reports that she has quit smoking. Her smoking use included cigarettes. She has never used smokeless tobacco. She reports that she does not currently use alcohol. She reports that she does not use drugs.  Current Outpatient Medications   Medication Instructions    ALPRAZolam (XANAX) 0.25 mg, Daily PRN    azithromycin (ZITHROMAX) 250 mg tablet Take 2 tablets today then 1 tablet daily x 4 days    losartan (COZAAR) 50 mg, Oral, Daily    methylPREDNISolone 4 MG tablet therapy pack Use as directed on package    nitrofurantoin (MACROBID) 50 mg, As needed    promethazine-dextromethorphan (PHENERGAN-DM) 6.25-15 mg/5 mL oral syrup 5 mL, Oral, 4 times daily PRN   Allergies[4]     Objective   /82   Pulse 68   Temp (!) 97.1 °F (36.2 °C)   Resp 16   Ht 5' 3\" (1.6 m)   Wt 72.9 kg (160 lb 12.8 oz)   LMP 07/01/2023 (Approximate)   SpO2 99%   BMI 28.48 kg/m²      Physical Exam  Vitals and nursing note reviewed.   Constitutional:       General: She is not in acute distress.     Appearance: She is normal weight.   HENT:      Head: Normocephalic and atraumatic.      Right Ear: Tympanic membrane, ear canal and external ear normal.      Left Ear: Tympanic membrane, ear canal and external ear normal.      Nose: Nose normal. No congestion.      Mouth/Throat:      Mouth: Mucous membranes are moist.      Pharynx: Oropharynx is clear. No oropharyngeal exudate.     Eyes:      General:         Right eye: No discharge.         Left eye: No discharge.      Conjunctiva/sclera: Conjunctivae normal.      Pupils: Pupils are equal, round, and reactive to light.       Cardiovascular:      Rate and Rhythm: Normal rate and regular " "rhythm.      Pulses: Normal pulses.      Heart sounds: Normal heart sounds.   Pulmonary:      Effort: Pulmonary effort is normal. No respiratory distress.      Breath sounds: Normal breath sounds. No wheezing or rhonchi.   Abdominal:      General: Abdomen is flat. Bowel sounds are normal.      Tenderness: There is no abdominal tenderness.     Musculoskeletal:      Comments: Mild tenderness to palpation along the right upper back and anterior right-sided chest wall area with sharp tenderness to the right lateral chest wall following the midaxillary line underneath her armpit.  Neurovascularly intact.  Equal bilateral range of motion.  No visible bruising or swelling.   Lymphadenopathy:      Cervical: No cervical adenopathy.     Skin:     General: Skin is warm.      Capillary Refill: Capillary refill takes less than 2 seconds.     Neurological:      General: No focal deficit present.      Mental Status: She is alert and oriented to person, place, and time.     Psychiatric:         Mood and Affect: Mood normal.         Behavior: Behavior normal.         Portions of the record may have been created with voice recognition software.  Occasional wrong word or \"sound a like\" substitutions may have occurred due to the inherent limitations of voice recognition software.  Read the chart carefully and recognize, using context, where substitutions have occurred.       [1]   Past Medical History:  Diagnosis Date    Anxiety     Aortic anomaly     monitored by Cardiology    BRCA1 negative     BRCA2 negative     Enlarged thoracic aorta (HCC)     Female infertility     clomid tx in past; ultimately conceived x 2 naturally    Kidney stone     MVP (mitral valve prolapse)     Urinary tract infection     Varicella    [2]   Past Surgical History:  Procedure Laterality Date    BREAST CYST ASPIRATION Bilateral 2000   [3]   Family History  Problem Relation Name Age of Onset    Breast cancer Mother  69    Heart attack Father Leonid     " Cervical cancer Sister      No Known Problems Daughter merlin     No Known Problems Daughter alba     No Known Problems Maternal Grandmother      Heart attack Maternal Grandfather Tony     No Known Problems Paternal Grandmother      Stroke Paternal Grandfather Anirudh     No Known Problems Maternal Aunt janet     Ovarian cancer Paternal Aunt osmel     Breast cancer Paternal Aunt montserrat     Miscarriages / Stillbirths Paternal Aunt montserrat     Breast cancer Cousin donita 48    BRCA1 Negative Cousin donita     No Known Problems Half-Sister monse     Colon cancer Neg Hx      Uterine cancer Neg Hx      Breast cancer additional onset Neg Hx      Endometrial cancer Neg Hx      BRCA1 Positive Neg Hx      BRCA 1/2 Neg Hx      BRCA2 Negative Neg Hx      BRCA2 Positive Neg Hx     [4]   Allergies  Allergen Reactions    Cefaclor Anaphylaxis     Reaction Date: 25Nov2003;     Morphine And Codeine Anaphylaxis     She states she has never had morphine; would like this removed    Penicillins Angioedema     Reaction Date: 25Nov2003;       Sulfa Antibiotics Anaphylaxis     Reaction Date: 25Nov2003;     Sulfur Anaphylaxis

## 2025-05-30 ENCOUNTER — RA CDI HCC (OUTPATIENT)
Dept: OTHER | Facility: HOSPITAL | Age: 55
End: 2025-05-30

## 2025-06-05 ENCOUNTER — OFFICE VISIT (OUTPATIENT)
Dept: FAMILY MEDICINE CLINIC | Facility: CLINIC | Age: 55
End: 2025-06-05
Payer: COMMERCIAL

## 2025-06-05 ENCOUNTER — TELEPHONE (OUTPATIENT)
Dept: ADMINISTRATIVE | Facility: OTHER | Age: 55
End: 2025-06-05

## 2025-06-05 VITALS
WEIGHT: 157.85 LBS | SYSTOLIC BLOOD PRESSURE: 124 MMHG | HEART RATE: 68 BPM | DIASTOLIC BLOOD PRESSURE: 66 MMHG | OXYGEN SATURATION: 100 % | BODY MASS INDEX: 27.96 KG/M2 | TEMPERATURE: 97.7 F

## 2025-06-05 DIAGNOSIS — R63.5 WEIGHT GAIN: Primary | ICD-10-CM

## 2025-06-05 DIAGNOSIS — Z78.0 MENOPAUSE: ICD-10-CM

## 2025-06-05 DIAGNOSIS — F41.1 GAD (GENERALIZED ANXIETY DISORDER): ICD-10-CM

## 2025-06-05 DIAGNOSIS — R05.2 SUBACUTE COUGH: ICD-10-CM

## 2025-06-05 DIAGNOSIS — E66.3 OVERWEIGHT: ICD-10-CM

## 2025-06-05 DIAGNOSIS — I10 ESSENTIAL HYPERTENSION: ICD-10-CM

## 2025-06-05 PROCEDURE — 99214 OFFICE O/P EST MOD 30 MIN: CPT | Performed by: PHYSICIAN ASSISTANT

## 2025-06-05 RX ORDER — BENZONATATE 100 MG/1
100 CAPSULE ORAL 3 TIMES DAILY PRN
Qty: 20 CAPSULE | Refills: 0 | Status: SHIPPED | OUTPATIENT
Start: 2025-06-05

## 2025-06-05 RX ORDER — BUPROPION HYDROCHLORIDE 100 MG/1
TABLET ORAL
Qty: 70 TABLET | Refills: 0 | Status: SHIPPED | OUTPATIENT
Start: 2025-06-05

## 2025-06-05 RX ORDER — NALTREXONE HYDROCHLORIDE 50 MG/1
TABLET, FILM COATED ORAL
Qty: 21 TABLET | Refills: 0 | Status: SHIPPED | OUTPATIENT
Start: 2025-06-05

## 2025-06-05 NOTE — TELEPHONE ENCOUNTER
Upon review of the In Basket request we have found as a result of outreach that the patient did not have the requested item(s) completed or the patient was not seen by the practice.     Mammo scheduled for 4/1/25 was canceled- patient had MRI instead.    Any additional questions or concerns should be emailed to the Practice Liaisons via the appropriate education email address, please do not reply via In Basket.    Thank you  Ayala Fragoso MA   [facility]

## 2025-06-05 NOTE — ASSESSMENT & PLAN NOTE
Blood pressure in the office today 124/66 and well-controlled.  Continues to take losartan at night.  Will continue to monitor outside the office.

## 2025-06-05 NOTE — PROGRESS NOTES
Name: Bryanna Cohen      : 1970      MRN: [id number]  Encounter Provider: Danuta Palmer PA-C  Encounter Date: 2025   Encounter department: [facility] PRIMARY CARE  :  Assessment & Plan  Weight gain  Patient has gained 20 pounds over the last year but states that she is still fastidious in watching her diet and exercise.  She is not overeating.  She has noticed this increased weight at the time of menopause.  She had been placed on some estradiol cream vaginally by her gynecologist but is not interested in on remaining on this.  She is still recovering from acute upper respiratory infection and had been placed on steroids as part of her treatment regimen but this does not account for her 20 pound weight loss.  She is continuing to cough and her chest remains very sore.  Orders:    benzonatate (TESSALON PERLES) 100 mg capsule; Take 1 capsule (100 mg total) by mouth 3 (three) times a day as needed for cough    GABY (generalized anxiety disorder)  Anxiety continues to be an issue.  She rarely needs to take Xanax for breakthrough anxiety issues.       Essential hypertension  Blood pressure in the office today 124/66 and well-controlled.  Continues to take losartan at night.  Will continue to monitor outside the office.       Subacute cough  Since her upper respiratory tract infection dissipated, she continues to have a cough and the cough is extremely painful to her.  We are going to try Tessalon Perles for this and we discussed its mechanism of action.  Orders:    benzonatate (TESSALON PERLES) 100 mg capsule; Take 1 capsule (100 mg total) by mouth 3 (three) times a day as needed for cough    Menopause  Patient is through menopause but is not having the traditional postmenopausal symptoms.  She denies hot flashes dry eyes or difficulty with concentration.  She has been gaining weight which might be related to menopause.       Overweight  As part of shared decision making,  patient is going to try the generic form of Contrave.  She is not interested in a GLP-1 agonist and she would not qualify based on her BMI of 27.    Orders:    buPROPion (WELLBUTRIN) 100 mg tablet; Week 1: 1 tablet in a.m.  Week 2: 1 tab in a.m. and 1 tablet p.m.  Week 3: 2 tabs in a.m. and 1 tab in p.m.  Week 4: 2 tabs in a.m. and 2 tabs in p.m.    naltrexone (REVIA) 50 mg tablet; Weeks 1 and 2: 1/2 tab in AM.  Week 3 and thereafter 1 tab in a.m.           History of Present Illness   HPI: 54-year-old female sees me for her primary care services.  She had been seen in urgent care on April 26 May 25 and May 29.  She has been treated for presumed upper respiratory infection/acute bronchitis with 2 courses of azithromycin.  She had traveled to [place] on vacation and developed severe coughing with sputum production she was glad that she was given Z-Estuardo to take.    Patient's coughing has been so significant that her chest is very sore.  She is trying everything to try to stop the coughing.  We are going to add Tessalon Perles.    Patient is interested in trying medication assistance for weight loss.  She is use to lose it at and she has increased her dietary awareness and exercise but she is not able to lose weight.  As part of shared decision making, she is willing to try the generic form of Contrave.  She had weighed 139 pounds prior to her weight gain which is now at 157.    She is going to be seen in 1 month after she gets full dosing of the Contrave and I will give additional weight loss tips at that time.  We talked about the intended side effects of this medication and the important role for maintaining adequate protein.    She has not had any postmenopausal bleeding.  She is no longer having any sputum production.  Review of Systems: Has just recovered from respiratory tract infection and has some residual coughing.    Objective   /66 (BP Location: Left arm, Patient Position: Sitting, Cuff Size:  Standard)   Pulse 68   Temp 97.7 °F (36.5 °C) (Tympanic)   Wt 71.6 kg (157 lb 13.6 oz)   LMP 07/01/2023 (Approximate)   SpO2 100%   BMI 27.96 kg/m²      Physical Exam: Reviewed vital signs.  She is normotensive and afebrile.    Heart is regular rate without murmur rub or gallop.  Lungs are clear to auscultation.  Not using accessory muscles of respiration.  Aerating bases without difficulty.  Administrative Statements   I have spent a total time of 30 minutes in caring for this patient on the day of the visit/encounter including Diagnostic results, Prognosis, Risks and benefits of tx options, Instructions for management, Patient and family education, Impressions, Counseling / Coordination of care, Documenting in the medical record, Reviewing/placing orders in the medical record (including tests, medications, and/or procedures), and Obtaining or reviewing history      I will see the patient in 1 month to assess her response to Contrave generic equivalent medication.  We can discuss additional weight loss strategies at that time..

## 2025-06-05 NOTE — ASSESSMENT & PLAN NOTE
Anxiety continues to be an issue.  She rarely needs to take Xanax for breakthrough anxiety issues.

## 2025-06-05 NOTE — ASSESSMENT & PLAN NOTE
Patient has gained 20 pounds over the last year but states that she is still fastidious in watching her diet and exercise.  She is not overeating.  She has noticed this increased weight at the time of menopause.  She had been placed on some estradiol cream vaginally by her gynecologist but is not interested in on remaining on this.  She is still recovering from acute upper respiratory infection and had been placed on steroids as part of her treatment regimen but this does not account for her 20 pound weight loss.  She is continuing to cough and her chest remains very sore.  Orders:    benzonatate (TESSALON PERLES) 100 mg capsule; Take 1 capsule (100 mg total) by mouth 3 (three) times a day as needed for cough

## 2025-06-05 NOTE — TELEPHONE ENCOUNTER
----- Message from Kanchan GREEN sent at 6/5/2025  9:46 AM EDT -----  Regarding: Care Gap Request  06/05/25 9:46 MAXXsophia, our patient No patient name on file. has had Mammogram completed/performed. Please assist in updating the patient chart by pulling the Care Everywhere (CE) document. The date of service is 2025. Thank you,MAGDALENE [facility] Please review providers notes : Patient just had MRI of the breast May 2025.  We are deferring her next mammogram until April 2026 as a 2-year scheduling for  mammogram is appropriate.  The MRI showed no abnormalities and patient does not want radiation exposure and it is completely appropriate and within guidelines to do mammogram every 2 years.

## 2025-07-02 ENCOUNTER — OFFICE VISIT (OUTPATIENT)
Dept: FAMILY MEDICINE CLINIC | Facility: CLINIC | Age: 55
End: 2025-07-02
Payer: COMMERCIAL

## 2025-07-02 VITALS
DIASTOLIC BLOOD PRESSURE: 78 MMHG | WEIGHT: 157.41 LBS | HEART RATE: 87 BPM | SYSTOLIC BLOOD PRESSURE: 118 MMHG | BODY MASS INDEX: 27.88 KG/M2 | OXYGEN SATURATION: 98 % | TEMPERATURE: 98 F

## 2025-07-02 DIAGNOSIS — E66.3 OVERWEIGHT: ICD-10-CM

## 2025-07-02 DIAGNOSIS — R63.5 WEIGHT GAIN: Primary | ICD-10-CM

## 2025-07-02 DIAGNOSIS — I10 ESSENTIAL HYPERTENSION: ICD-10-CM

## 2025-07-02 DIAGNOSIS — F41.1 GAD (GENERALIZED ANXIETY DISORDER): ICD-10-CM

## 2025-07-02 PROCEDURE — 99213 OFFICE O/P EST LOW 20 MIN: CPT | Performed by: PHYSICIAN ASSISTANT

## 2025-07-02 RX ORDER — BUPROPION HYDROCHLORIDE 100 MG/1
TABLET ORAL
Qty: 360 TABLET | Refills: 1 | Status: SHIPPED | OUTPATIENT
Start: 2025-07-02

## 2025-07-02 RX ORDER — NALTREXONE HYDROCHLORIDE 50 MG/1
TABLET, FILM COATED ORAL
Qty: 90 TABLET | Refills: 1 | Status: SHIPPED | OUTPATIENT
Start: 2025-07-02

## 2025-07-02 NOTE — ASSESSMENT & PLAN NOTE
Anxiety slightly worse going from a GABY 7 of 11-17.  She states that her  is having his knee replaced next month and this is a cause of concern for her.

## 2025-07-02 NOTE — PROGRESS NOTES
Name: Bryanna Cohen      : 1970      MRN: [id number]  Encounter Provider: Danuta Palmer PA-C  Encounter Date: 2025   Encounter department: [facility] PRIMARY [facility]ARE  :  Assessment & Plan  Weight gain  Patient has gained 20 pounds and wished to lose weight.  Insurance does not cover GLP-1 agonist.  She has started Contrave generic equivalent and lost 3 pounds since her last appointment.  She is having some headaches associated with this so she is not accelerated full dosing at this point.  I told her that we did not need to be in a hurry for full dosing and that she could gradually get the dosing to where it needs to be.  I asked her whether the 3 pound weight loss was worth putting up with the side effects and she said yes.  She is attempting to get enough protein.  She is taking some fiber supplements.       GABY (generalized anxiety disorder)  Anxiety slightly worse going from a GABY 7 of 11-17.  She states that her  is having his knee replaced next month and this is a cause of concern for her.       Essential hypertension  Repeat blood pressure today 118/78 on losartan.       Overweight  BMI is 27.88.  Has lost 3 pounds.  This will willing to continue on the medication.    Orders:    buPROPion (WELLBUTRIN) 100 mg tablet; 2 tabs in a.m. and 2 tabs in p.m.    naltrexone (REVIA) 50 mg tablet; 1 tab in a.m.           History of Present Illness   HPI: 54-year-old female sees me for primary care services.  She has a longstanding history of anxiety and panic attacks.  She gained a lot of weight at menopause and is hoping to get back down to where she was before her weight gain.    We talked about side effects and benefits of the generic Contrave.  She is willing to stay on this and will see me in 2 months.  Had some minor constipation treated with fiber.  Review of Systems: No recent URI or viral syndrome.    Objective   /78   Pulse 87   Temp 98 °F (36.7 °C)  (Tympanic)   Wt 71.4 kg (157 lb 6.5 oz)   LMP 07/01/2023 (Approximate)   SpO2 98%   BMI 27.88 kg/m²      Physical Exam: Reviewed vital signs.  She is normotensive and afebrile.    Heart is regular rate without murmur rub or gallop.  Lungs are clear to auscultation.  Administrative Statements   I have spent a total time of 20 minutes in caring for this patient on the day of the visit/encounter including Instructions for management, Patient and family education, Impressions, Counseling / Coordination of care, Documenting in the medical record, Reviewing/placing orders in the medical record (including tests, medications, and/or procedures), and Obtaining or reviewing history      I will see her in 2 months to assess her response to weight loss efforts..   Is This A New Presentation, Or A Follow-Up?: Cyst How Severe Is Your Cyst?: moderate ,

## 2025-07-02 NOTE — ASSESSMENT & PLAN NOTE
Patient has gained 20 pounds and wished to lose weight.  Insurance does not cover GLP-1 agonist.  She has started Contrave generic equivalent and lost 3 pounds since her last appointment.  She is having some headaches associated with this so she is not accelerated full dosing at this point.  I told her that we did not need to be in a hurry for full dosing and that she could gradually get the dosing to where it needs to be.  I asked her whether the 3 pound weight loss was worth putting up with the side effects and she said yes.  She is attempting to get enough protein.  She is taking some fiber supplements.